# Patient Record
Sex: MALE | Race: WHITE | Employment: OTHER | URBAN - NONMETROPOLITAN AREA
[De-identification: names, ages, dates, MRNs, and addresses within clinical notes are randomized per-mention and may not be internally consistent; named-entity substitution may affect disease eponyms.]

---

## 2021-12-14 ENCOUNTER — HOSPITAL ENCOUNTER (INPATIENT)
Age: 26
LOS: 1 days | Discharge: ANOTHER HOSPITAL | DRG: 420 | End: 2021-12-16
Attending: FAMILY MEDICINE | Admitting: PEDIATRICS
Payer: MEDICARE

## 2021-12-14 ENCOUNTER — APPOINTMENT (OUTPATIENT)
Dept: GENERAL RADIOLOGY | Age: 26
DRG: 420 | End: 2021-12-14
Payer: MEDICARE

## 2021-12-14 DIAGNOSIS — I10 UNCONTROLLED HYPERTENSION: ICD-10-CM

## 2021-12-14 DIAGNOSIS — E11.00 HYPEROSMOLAR HYPERGLYCEMIC STATE (HHS) (HCC): ICD-10-CM

## 2021-12-14 DIAGNOSIS — N17.9 ACUTE KIDNEY INJURY SUPERIMPOSED ON CHRONIC KIDNEY DISEASE (HCC): Primary | ICD-10-CM

## 2021-12-14 DIAGNOSIS — D64.9 ANEMIA, UNSPECIFIED TYPE: ICD-10-CM

## 2021-12-14 DIAGNOSIS — I35.8 ENDOCARDITIS OF AORTIC VALVE: ICD-10-CM

## 2021-12-14 DIAGNOSIS — R77.8 ELEVATED TROPONIN: ICD-10-CM

## 2021-12-14 DIAGNOSIS — N18.9 ACUTE KIDNEY INJURY SUPERIMPOSED ON CHRONIC KIDNEY DISEASE (HCC): Primary | ICD-10-CM

## 2021-12-14 LAB
ALBUMIN SERPL-MCNC: 2.5 G/DL (ref 3.5–5.1)
ALP BLD-CCNC: 98 U/L (ref 38–126)
ALT SERPL-CCNC: 57 U/L (ref 11–66)
ANION GAP SERPL CALCULATED.3IONS-SCNC: 14 MEQ/L (ref 8–16)
AST SERPL-CCNC: 28 U/L (ref 5–40)
BASE EXCESS MIXED: -11.3 MMOL/L (ref -2–3)
BASOPHILS # BLD: 1.2 %
BASOPHILS ABSOLUTE: 0.1 THOU/MM3 (ref 0–0.1)
BETA-HYDROXYBUTYRATE: 0.91 MG/DL (ref 0.2–2.81)
BILIRUB SERPL-MCNC: < 0.2 MG/DL (ref 0.3–1.2)
BILIRUBIN DIRECT: < 0.2 MG/DL (ref 0–0.3)
BUN BLDV-MCNC: 77 MG/DL (ref 7–22)
CALCIUM SERPL-MCNC: 8.4 MG/DL (ref 8.5–10.5)
CHLORIDE BLD-SCNC: 97 MEQ/L (ref 98–111)
CO2: 15 MEQ/L (ref 23–33)
COLLECTED BY:: ABNORMAL
CREAT SERPL-MCNC: 4.5 MG/DL (ref 0.4–1.2)
EOSINOPHIL # BLD: 3.3 %
EOSINOPHILS ABSOLUTE: 0.3 THOU/MM3 (ref 0–0.4)
ERYTHROCYTE [DISTWIDTH] IN BLOOD BY AUTOMATED COUNT: 14.8 % (ref 11.5–14.5)
ERYTHROCYTE [DISTWIDTH] IN BLOOD BY AUTOMATED COUNT: 47.7 FL (ref 35–45)
GFR SERPL CREATININE-BSD FRML MDRD: 16 ML/MIN/1.73M2
GLUCOSE BLD-MCNC: 410 MG/DL (ref 70–108)
GLUCOSE BLD-MCNC: 505 MG/DL (ref 70–108)
GLUCOSE BLD-MCNC: 862 MG/DL (ref 70–108)
HCO3, MIXED: 13 MMOL/L (ref 23–28)
HCT VFR BLD CALC: 27.2 % (ref 42–52)
HEMOGLOBIN: 8.7 GM/DL (ref 14–18)
IMMATURE GRANS (ABS): 0.04 THOU/MM3 (ref 0–0.07)
IMMATURE GRANULOCYTES: 0.5 %
LYMPHOCYTES # BLD: 8.1 %
LYMPHOCYTES ABSOLUTE: 0.7 THOU/MM3 (ref 1–4.8)
MCH RBC QN AUTO: 28.3 PG (ref 26–33)
MCHC RBC AUTO-ENTMCNC: 32 GM/DL (ref 32.2–35.5)
MCV RBC AUTO: 88.6 FL (ref 80–94)
MONOCYTES # BLD: 12.3 %
MONOCYTES ABSOLUTE: 1 THOU/MM3 (ref 0.4–1.3)
NUCLEATED RED BLOOD CELLS: 0 /100 WBC
O2 SAT, MIXED: 94 %
OSMOLALITY CALCULATION: 318.7 MOSMOL/KG (ref 275–300)
PCO2, MIXED VENOUS: 24 MMHG (ref 41–51)
PH, MIXED: 7.34 (ref 7.31–7.41)
PLATELET # BLD: 422 THOU/MM3 (ref 130–400)
PMV BLD AUTO: 11.6 FL (ref 9.4–12.4)
PO2 MIXED: 74 MMHG (ref 25–40)
POTASSIUM SERPL-SCNC: 5.9 MEQ/L (ref 3.5–5.2)
PRO-BNP: 4931 PG/ML (ref 0–450)
RBC # BLD: 3.07 MILL/MM3 (ref 4.7–6.1)
SEG NEUTROPHILS: 74.6 %
SEGMENTED NEUTROPHILS ABSOLUTE COUNT: 6 THOU/MM3 (ref 1.8–7.7)
SODIUM BLD-SCNC: 126 MEQ/L (ref 135–145)
TOTAL PROTEIN: 5.5 G/DL (ref 6.1–8)
TROPONIN T: 0.12 NG/ML
WBC # BLD: 8.1 THOU/MM3 (ref 4.8–10.8)

## 2021-12-14 PROCEDURE — 82248 BILIRUBIN DIRECT: CPT

## 2021-12-14 PROCEDURE — 96376 TX/PRO/DX INJ SAME DRUG ADON: CPT

## 2021-12-14 PROCEDURE — 96361 HYDRATE IV INFUSION ADD-ON: CPT

## 2021-12-14 PROCEDURE — 36415 COLL VENOUS BLD VENIPUNCTURE: CPT

## 2021-12-14 PROCEDURE — 80053 COMPREHEN METABOLIC PANEL: CPT

## 2021-12-14 PROCEDURE — 83880 ASSAY OF NATRIURETIC PEPTIDE: CPT

## 2021-12-14 PROCEDURE — 82803 BLOOD GASES ANY COMBINATION: CPT

## 2021-12-14 PROCEDURE — 6370000000 HC RX 637 (ALT 250 FOR IP): Performed by: FAMILY MEDICINE

## 2021-12-14 PROCEDURE — 96365 THER/PROPH/DIAG IV INF INIT: CPT

## 2021-12-14 PROCEDURE — 82010 KETONE BODYS QUAN: CPT

## 2021-12-14 PROCEDURE — 2580000003 HC RX 258: Performed by: PHYSICIAN ASSISTANT

## 2021-12-14 PROCEDURE — 93005 ELECTROCARDIOGRAM TRACING: CPT | Performed by: PHYSICIAN ASSISTANT

## 2021-12-14 PROCEDURE — 71045 X-RAY EXAM CHEST 1 VIEW: CPT

## 2021-12-14 PROCEDURE — 99284 EMERGENCY DEPT VISIT MOD MDM: CPT

## 2021-12-14 PROCEDURE — 84484 ASSAY OF TROPONIN QUANT: CPT

## 2021-12-14 PROCEDURE — 6360000002 HC RX W HCPCS: Performed by: PHYSICIAN ASSISTANT

## 2021-12-14 PROCEDURE — 82948 REAGENT STRIP/BLOOD GLUCOSE: CPT

## 2021-12-14 PROCEDURE — 85025 COMPLETE CBC W/AUTO DIFF WBC: CPT

## 2021-12-14 PROCEDURE — 96366 THER/PROPH/DIAG IV INF ADDON: CPT

## 2021-12-14 PROCEDURE — 2580000003 HC RX 258: Performed by: FAMILY MEDICINE

## 2021-12-14 PROCEDURE — 96375 TX/PRO/DX INJ NEW DRUG ADDON: CPT

## 2021-12-14 PROCEDURE — 6370000000 HC RX 637 (ALT 250 FOR IP): Performed by: PHYSICIAN ASSISTANT

## 2021-12-14 RX ORDER — INSULIN GLARGINE 100 [IU]/ML
20 INJECTION, SOLUTION SUBCUTANEOUS 2 TIMES DAILY
COMMUNITY

## 2021-12-14 RX ORDER — FLUOXETINE HYDROCHLORIDE 20 MG/1
20 CAPSULE ORAL NIGHTLY
COMMUNITY

## 2021-12-14 RX ORDER — HYDRALAZINE HYDROCHLORIDE 20 MG/ML
10 INJECTION INTRAMUSCULAR; INTRAVENOUS ONCE
Status: COMPLETED | OUTPATIENT
Start: 2021-12-14 | End: 2021-12-14

## 2021-12-14 RX ORDER — PREGABALIN 100 MG/1
200 CAPSULE ORAL 2 TIMES DAILY
COMMUNITY

## 2021-12-14 RX ORDER — 0.9 % SODIUM CHLORIDE 0.9 %
1000 INTRAVENOUS SOLUTION INTRAVENOUS ONCE
Status: COMPLETED | OUTPATIENT
Start: 2021-12-14 | End: 2021-12-14

## 2021-12-14 RX ORDER — QUETIAPINE FUMARATE 25 MG/1
25 TABLET, FILM COATED ORAL NIGHTLY
COMMUNITY

## 2021-12-14 RX ADMIN — SODIUM CHLORIDE 10.8 UNITS/HR: 9 INJECTION, SOLUTION INTRAVENOUS at 21:26

## 2021-12-14 RX ADMIN — INSULIN HUMAN 5 UNITS: 100 INJECTION, SOLUTION PARENTERAL at 21:03

## 2021-12-14 RX ADMIN — HYDRALAZINE HYDROCHLORIDE 10 MG: 20 INJECTION INTRAMUSCULAR; INTRAVENOUS at 20:52

## 2021-12-14 RX ADMIN — SODIUM CHLORIDE 500 ML: 9 INJECTION, SOLUTION INTRAVENOUS at 20:42

## 2021-12-14 ASSESSMENT — PAIN SCALES - GENERAL
PAINLEVEL_OUTOF10: 8
PAINLEVEL_OUTOF10: 9

## 2021-12-14 ASSESSMENT — PAIN DESCRIPTION - PAIN TYPE: TYPE: ACUTE PAIN

## 2021-12-14 ASSESSMENT — ENCOUNTER SYMPTOMS
DIARRHEA: 0
ABDOMINAL PAIN: 0
VOMITING: 0
SORE THROAT: 0
PHOTOPHOBIA: 0
NAUSEA: 0
SHORTNESS OF BREATH: 0
RHINORRHEA: 0
COUGH: 0

## 2021-12-14 ASSESSMENT — PAIN DESCRIPTION - LOCATION: LOCATION: GENERALIZED

## 2021-12-14 NOTE — Clinical Note
Patient Class: Inpatient [101]   REQUIRED: Diagnosis: DKA, type 1, not at goal [facility] [[id number]]   Estimated Length of Stay: Estimated stay of more than 2 midnights   Admitting Provider: Junaid Tompkins [[id number]]   Telemetry/Cardiac Monitoring Required?: Yes

## 2021-12-14 NOTE — ED TRIAGE NOTES
Pt presents to the ED through triage with c/c \"needing dialysis. \" Pt states he was seen at [facility] directly before coming here. Pt as at [facility] center to receive IV vancomycin d/t endocarditis. Pt also told he needs dialysis d/t swelling in bilateral extremities and creatine 4.7. Pt is a diabetic/kidney failure patient. Pt states he has never had dialysis before. At [facility], pt states his potassium was also elevated. Pt was given 30g Kayexalate PTA. Vitals stable. Pt rates pain 9/10 all over his body.  IV established

## 2021-12-15 VITALS
WEIGHT: 115 LBS | OXYGEN SATURATION: 99 % | RESPIRATION RATE: 16 BRPM | BODY MASS INDEX: 20.38 KG/M2 | HEART RATE: 95 BPM | TEMPERATURE: 98 F | HEIGHT: 63 IN | DIASTOLIC BLOOD PRESSURE: 85 MMHG | SYSTOLIC BLOOD PRESSURE: 143 MMHG

## 2021-12-15 PROBLEM — E87.20 METABOLIC ACIDOSIS: Status: ACTIVE | Noted: 2021-12-15

## 2021-12-15 PROBLEM — E11.9 DM2 (DIABETES MELLITUS, TYPE 2) (HCC): Status: ACTIVE | Noted: 2021-12-15

## 2021-12-15 PROBLEM — I33.0 ACUTE INFECTIVE ENDOCARDITIS: Status: ACTIVE | Noted: 2021-12-15

## 2021-12-15 PROBLEM — E10.10 DKA, TYPE 1, NOT AT GOAL (HCC): Status: ACTIVE | Noted: 2021-12-15

## 2021-12-15 PROBLEM — R60.9 PERIPHERAL EDEMA: Status: ACTIVE | Noted: 2021-12-15

## 2021-12-15 PROBLEM — N17.9 AKI (ACUTE KIDNEY INJURY) (HCC): Status: ACTIVE | Noted: 2021-12-15

## 2021-12-15 LAB
AMPHETAMINE+METHAMPHETAMINE URINE SCREEN: NEGATIVE
ANION GAP SERPL CALCULATED.3IONS-SCNC: 13 MEQ/L (ref 8–16)
ANION GAP SERPL CALCULATED.3IONS-SCNC: 15 MEQ/L (ref 8–16)
ANION GAP SERPL CALCULATED.3IONS-SCNC: 17 MEQ/L (ref 8–16)
BACTERIA: ABNORMAL /HPF
BARBITURATE QUANTITATIVE URINE: NEGATIVE
BASOPHILS # BLD: 2 %
BASOPHILS ABSOLUTE: 0.2 THOU/MM3 (ref 0–0.1)
BENZODIAZEPINE QUANTITATIVE URINE: NEGATIVE
BILIRUBIN URINE: NEGATIVE
BLOOD, URINE: ABNORMAL
BUN BLDV-MCNC: 75 MG/DL (ref 7–22)
CALCIUM SERPL-MCNC: 8.3 MG/DL (ref 8.5–10.5)
CALCIUM SERPL-MCNC: 8.4 MG/DL (ref 8.5–10.5)
CALCIUM SERPL-MCNC: 8.7 MG/DL (ref 8.5–10.5)
CANNABINOID QUANTITATIVE URINE: NEGATIVE
CASTS 2: ABNORMAL /LPF
CASTS UA: ABNORMAL /LPF
CHARACTER, URINE: CLEAR
CHLORIDE BLD-SCNC: 104 MEQ/L (ref 98–111)
CHLORIDE BLD-SCNC: 106 MEQ/L (ref 98–111)
CHLORIDE BLD-SCNC: 108 MEQ/L (ref 98–111)
CO2: 14 MEQ/L (ref 23–33)
CO2: 14 MEQ/L (ref 23–33)
CO2: 15 MEQ/L (ref 23–33)
COCAINE METABOLITE QUANTITATIVE URINE: NEGATIVE
COLOR: YELLOW
CREAT SERPL-MCNC: 4.6 MG/DL (ref 0.4–1.2)
CREAT SERPL-MCNC: 4.6 MG/DL (ref 0.4–1.2)
CREAT SERPL-MCNC: 4.8 MG/DL (ref 0.4–1.2)
CREATININE URINE: 24.8 MG/DL
CRYSTALS, UA: ABNORMAL
EKG ATRIAL RATE: 84 BPM
EKG P AXIS: 55 DEGREES
EKG P-R INTERVAL: 162 MS
EKG Q-T INTERVAL: 360 MS
EKG QRS DURATION: 78 MS
EKG QTC CALCULATION (BAZETT): 425 MS
EKG R AXIS: 53 DEGREES
EKG T AXIS: 58 DEGREES
EKG VENTRICULAR RATE: 84 BPM
EOSINOPHIL # BLD: 4.1 %
EOSINOPHILS ABSOLUTE: 0.4 THOU/MM3 (ref 0–0.4)
EPITHELIAL CELLS, UA: ABNORMAL /HPF
ERYTHROCYTE [DISTWIDTH] IN BLOOD BY AUTOMATED COUNT: 14.7 % (ref 11.5–14.5)
ERYTHROCYTE [DISTWIDTH] IN BLOOD BY AUTOMATED COUNT: 46.7 FL (ref 35–45)
GFR SERPL CREATININE-BSD FRML MDRD: 15 ML/MIN/1.73M2
GLUCOSE BLD-MCNC: 100 MG/DL (ref 70–108)
GLUCOSE BLD-MCNC: 106 MG/DL (ref 70–108)
GLUCOSE BLD-MCNC: 157 MG/DL (ref 70–108)
GLUCOSE BLD-MCNC: 209 MG/DL (ref 70–108)
GLUCOSE BLD-MCNC: 228 MG/DL (ref 70–108)
GLUCOSE BLD-MCNC: 308 MG/DL (ref 70–108)
GLUCOSE BLD-MCNC: 311 MG/DL (ref 70–108)
GLUCOSE BLD-MCNC: 413 MG/DL (ref 70–108)
GLUCOSE BLD-MCNC: 430 MG/DL (ref 70–108)
GLUCOSE BLD-MCNC: 541 MG/DL (ref 70–108)
GLUCOSE BLD-MCNC: 79 MG/DL (ref 70–108)
GLUCOSE BLD-MCNC: 80 MG/DL (ref 70–108)
GLUCOSE BLD-MCNC: 81 MG/DL (ref 70–108)
GLUCOSE BLD-MCNC: 84 MG/DL (ref 70–108)
GLUCOSE BLD-MCNC: 85 MG/DL (ref 70–108)
GLUCOSE URINE: >= 1000 MG/DL
HCT VFR BLD CALC: 28.6 % (ref 42–52)
HEMOGLOBIN: 9.3 GM/DL (ref 14–18)
IMMATURE GRANS (ABS): 0.05 THOU/MM3 (ref 0–0.07)
IMMATURE GRANULOCYTES: 0.6 %
KETONES, URINE: NEGATIVE
LEUKOCYTE ESTERASE, URINE: NEGATIVE
LYMPHOCYTES # BLD: 12.9 %
LYMPHOCYTES ABSOLUTE: 1.2 THOU/MM3 (ref 1–4.8)
MAGNESIUM: 1.9 MG/DL (ref 1.6–2.4)
MAGNESIUM: 2 MG/DL (ref 1.6–2.4)
MCH RBC QN AUTO: 28.3 PG (ref 26–33)
MCHC RBC AUTO-ENTMCNC: 32.5 GM/DL (ref 32.2–35.5)
MCV RBC AUTO: 86.9 FL (ref 80–94)
MISCELLANEOUS 2: ABNORMAL
MONOCYTES # BLD: 16.3 %
MONOCYTES ABSOLUTE: 1.5 THOU/MM3 (ref 0.4–1.3)
NITRITE, URINE: NEGATIVE
NUCLEATED RED BLOOD CELLS: 0 /100 WBC
OPIATES, URINE: NEGATIVE
OSMOLALITY CALCULATION: 291.3 MOSMOL/KG (ref 275–300)
OSMOLALITY CALCULATION: 294.3 MOSMOL/KG (ref 275–300)
OSMOLALITY CALCULATION: 304 MOSMOL/KG (ref 275–300)
OXYCODONE: NEGATIVE
PH UA: 6.5 (ref 5–9)
PHENCYCLIDINE QUANTITATIVE URINE: NEGATIVE
PHOSPHORUS: 5.9 MG/DL (ref 2.4–4.7)
PLATELET # BLD: 502 THOU/MM3 (ref 130–400)
PMV BLD AUTO: 11.5 FL (ref 9.4–12.4)
POTASSIUM REFLEX MAGNESIUM: 5.5 MEQ/L (ref 3.5–5.2)
POTASSIUM SERPL-SCNC: 4.4 MEQ/L (ref 3.5–5.2)
POTASSIUM SERPL-SCNC: 4.5 MEQ/L (ref 3.5–5.2)
PROT/CREAT RATIO, UR: 7.86
PROTEIN UA: 300
PROTEIN, URINE: 194.9 MG/DL
RBC # BLD: 3.29 MILL/MM3 (ref 4.7–6.1)
RBC URINE: ABNORMAL /HPF
RENAL EPITHELIAL, UA: ABNORMAL
SEG NEUTROPHILS: 64.1 %
SEGMENTED NEUTROPHILS ABSOLUTE COUNT: 5.8 THOU/MM3 (ref 1.8–7.7)
SODIUM BLD-SCNC: 135 MEQ/L (ref 135–145)
SODIUM BLD-SCNC: 135 MEQ/L (ref 135–145)
SODIUM BLD-SCNC: 136 MEQ/L (ref 135–145)
SPECIFIC GRAVITY, URINE: 1.01 (ref 1–1.03)
UROBILINOGEN, URINE: 0.2 EU/DL (ref 0–1)
VANCOMYCIN RANDOM: 24.6 UG/ML (ref 0.1–39.9)
WBC # BLD: 9.1 THOU/MM3 (ref 4.8–10.8)
WBC UA: ABNORMAL /HPF
YEAST: ABNORMAL

## 2021-12-15 PROCEDURE — 83735 ASSAY OF MAGNESIUM: CPT

## 2021-12-15 PROCEDURE — 81001 URINALYSIS AUTO W/SCOPE: CPT

## 2021-12-15 PROCEDURE — 99223 1ST HOSP IP/OBS HIGH 75: CPT | Performed by: PEDIATRICS

## 2021-12-15 PROCEDURE — 84100 ASSAY OF PHOSPHORUS: CPT

## 2021-12-15 PROCEDURE — 6360000002 HC RX W HCPCS: Performed by: PEDIATRICS

## 2021-12-15 PROCEDURE — 2500000003 HC RX 250 WO HCPCS: Performed by: PEDIATRICS

## 2021-12-15 PROCEDURE — 82570 ASSAY OF URINE CREATININE: CPT

## 2021-12-15 PROCEDURE — 87040 BLOOD CULTURE FOR BACTERIA: CPT

## 2021-12-15 PROCEDURE — 99222 1ST HOSP IP/OBS MODERATE 55: CPT | Performed by: NURSE PRACTITIONER

## 2021-12-15 PROCEDURE — 6370000000 HC RX 637 (ALT 250 FOR IP): Performed by: PODIATRIST

## 2021-12-15 PROCEDURE — 99253 IP/OBS CNSLTJ NEW/EST LOW 45: CPT | Performed by: UROLOGY

## 2021-12-15 PROCEDURE — 93010 ELECTROCARDIOGRAM REPORT: CPT | Performed by: INTERNAL MEDICINE

## 2021-12-15 PROCEDURE — 96367 TX/PROPH/DG ADDL SEQ IV INF: CPT

## 2021-12-15 PROCEDURE — 99254 IP/OBS CNSLTJ NEW/EST MOD 60: CPT | Performed by: INTERNAL MEDICINE

## 2021-12-15 PROCEDURE — 85025 COMPLETE CBC W/AUTO DIFF WBC: CPT

## 2021-12-15 PROCEDURE — 96372 THER/PROPH/DIAG INJ SC/IM: CPT

## 2021-12-15 PROCEDURE — G0378 HOSPITAL OBSERVATION PER HR: HCPCS

## 2021-12-15 PROCEDURE — 6370000000 HC RX 637 (ALT 250 FOR IP): Performed by: PEDIATRICS

## 2021-12-15 PROCEDURE — 2580000003 HC RX 258: Performed by: PEDIATRICS

## 2021-12-15 PROCEDURE — 96366 THER/PROPH/DIAG IV INF ADDON: CPT

## 2021-12-15 PROCEDURE — 84156 ASSAY OF PROTEIN URINE: CPT

## 2021-12-15 PROCEDURE — 80202 ASSAY OF VANCOMYCIN: CPT

## 2021-12-15 PROCEDURE — 1200000000 HC SEMI PRIVATE

## 2021-12-15 PROCEDURE — 6370000000 HC RX 637 (ALT 250 FOR IP): Performed by: INTERNAL MEDICINE

## 2021-12-15 PROCEDURE — 36415 COLL VENOUS BLD VENIPUNCTURE: CPT

## 2021-12-15 PROCEDURE — 80048 BASIC METABOLIC PNL TOTAL CA: CPT

## 2021-12-15 PROCEDURE — 80307 DRUG TEST PRSMV CHEM ANLYZR: CPT

## 2021-12-15 PROCEDURE — 82948 REAGENT STRIP/BLOOD GLUCOSE: CPT

## 2021-12-15 RX ORDER — PREGABALIN 50 MG/1
100 CAPSULE ORAL DAILY
Status: DISCONTINUED | OUTPATIENT
Start: 2021-12-15 | End: 2021-12-16 | Stop reason: HOSPADM

## 2021-12-15 RX ORDER — SODIUM CHLORIDE 9 MG/ML
25 INJECTION, SOLUTION INTRAVENOUS PRN
Status: CANCELLED | OUTPATIENT
Start: 2021-12-15

## 2021-12-15 RX ORDER — FLUOXETINE HYDROCHLORIDE 20 MG/1
20 CAPSULE ORAL NIGHTLY
Status: DISCONTINUED | OUTPATIENT
Start: 2021-12-15 | End: 2021-12-16 | Stop reason: HOSPADM

## 2021-12-15 RX ORDER — DEXTROSE MONOHYDRATE 25 G/50ML
12.5 INJECTION, SOLUTION INTRAVENOUS PRN
Status: DISCONTINUED | OUTPATIENT
Start: 2021-12-15 | End: 2021-12-16 | Stop reason: HOSPADM

## 2021-12-15 RX ORDER — POLYETHYLENE GLYCOL 3350 17 G
2 POWDER IN PACKET (EA) ORAL
Status: DISCONTINUED | OUTPATIENT
Start: 2021-12-15 | End: 2021-12-16 | Stop reason: HOSPADM

## 2021-12-15 RX ORDER — POTASSIUM CHLORIDE 7.45 MG/ML
10 INJECTION INTRAVENOUS PRN
Status: DISCONTINUED | OUTPATIENT
Start: 2021-12-15 | End: 2021-12-15

## 2021-12-15 RX ORDER — NICOTINE POLACRILEX 4 MG
15 LOZENGE BUCCAL PRN
Status: DISCONTINUED | OUTPATIENT
Start: 2021-12-15 | End: 2021-12-16 | Stop reason: HOSPADM

## 2021-12-15 RX ORDER — QUETIAPINE FUMARATE 25 MG/1
25 TABLET, FILM COATED ORAL NIGHTLY
Status: DISCONTINUED | OUTPATIENT
Start: 2021-12-15 | End: 2021-12-16 | Stop reason: HOSPADM

## 2021-12-15 RX ORDER — SODIUM CHLORIDE 9 MG/ML
INJECTION, SOLUTION INTRAVENOUS CONTINUOUS
Status: DISCONTINUED | OUTPATIENT
Start: 2021-12-15 | End: 2021-12-15

## 2021-12-15 RX ORDER — SODIUM CHLORIDE 0.9 % (FLUSH) 0.9 %
5-40 SYRINGE (ML) INJECTION EVERY 12 HOURS SCHEDULED
Status: CANCELLED | OUTPATIENT
Start: 2021-12-15

## 2021-12-15 RX ORDER — POLYETHYLENE GLYCOL 3350 17 G/17G
17 POWDER, FOR SOLUTION ORAL DAILY PRN
Status: DISCONTINUED | OUTPATIENT
Start: 2021-12-15 | End: 2021-12-16 | Stop reason: HOSPADM

## 2021-12-15 RX ORDER — MAGNESIUM SULFATE 1 G/100ML
1000 INJECTION INTRAVENOUS PRN
Status: DISCONTINUED | OUTPATIENT
Start: 2021-12-15 | End: 2021-12-15

## 2021-12-15 RX ORDER — SODIUM CHLORIDE 9 MG/ML
INJECTION, SOLUTION INTRAVENOUS CONTINUOUS
Status: CANCELLED | OUTPATIENT
Start: 2021-12-15

## 2021-12-15 RX ORDER — HEPARIN SODIUM 5000 [USP'U]/ML
5000 INJECTION, SOLUTION INTRAVENOUS; SUBCUTANEOUS EVERY 8 HOURS SCHEDULED
Status: DISCONTINUED | OUTPATIENT
Start: 2021-12-15 | End: 2021-12-16 | Stop reason: HOSPADM

## 2021-12-15 RX ORDER — DEXTROSE MONOHYDRATE 50 MG/ML
100 INJECTION, SOLUTION INTRAVENOUS PRN
Status: DISCONTINUED | OUTPATIENT
Start: 2021-12-15 | End: 2021-12-16 | Stop reason: HOSPADM

## 2021-12-15 RX ORDER — DEXTROSE AND SODIUM CHLORIDE 5; .45 G/100ML; G/100ML
INJECTION, SOLUTION INTRAVENOUS CONTINUOUS PRN
Status: DISCONTINUED | OUTPATIENT
Start: 2021-12-15 | End: 2021-12-16 | Stop reason: HOSPADM

## 2021-12-15 RX ORDER — DEXTROSE MONOHYDRATE 25 G/50ML
12.5 INJECTION, SOLUTION INTRAVENOUS PRN
Status: DISCONTINUED | OUTPATIENT
Start: 2021-12-15 | End: 2021-12-15 | Stop reason: ALTCHOICE

## 2021-12-15 RX ORDER — INSULIN GLARGINE 100 [IU]/ML
5 INJECTION, SOLUTION SUBCUTANEOUS 2 TIMES DAILY
Status: DISCONTINUED | OUTPATIENT
Start: 2021-12-15 | End: 2021-12-16 | Stop reason: HOSPADM

## 2021-12-15 RX ORDER — SODIUM CHLORIDE 0.9 % (FLUSH) 0.9 %
5-40 SYRINGE (ML) INJECTION PRN
Status: CANCELLED | OUTPATIENT
Start: 2021-12-15

## 2021-12-15 RX ADMIN — QUETIAPINE FUMARATE 25 MG: 25 TABLET ORAL at 20:45

## 2021-12-15 RX ADMIN — INSULIN GLARGINE 5 UNITS: 100 INJECTION, SOLUTION SUBCUTANEOUS at 15:44

## 2021-12-15 RX ADMIN — FLUOXETINE 20 MG: 20 CAPSULE ORAL at 03:48

## 2021-12-15 RX ADMIN — INSULIN LISPRO 12 UNITS: 100 INJECTION, SOLUTION INTRAVENOUS; SUBCUTANEOUS at 20:46

## 2021-12-15 RX ADMIN — DEXTROSE AND SODIUM CHLORIDE: 5; 450 INJECTION, SOLUTION INTRAVENOUS at 01:54

## 2021-12-15 RX ADMIN — PREGABALIN 100 MG: 50 CAPSULE ORAL at 09:10

## 2021-12-15 RX ADMIN — INSULIN GLARGINE 5 UNITS: 100 INJECTION, SOLUTION SUBCUTANEOUS at 23:41

## 2021-12-15 RX ADMIN — FLUOXETINE 20 MG: 20 CAPSULE ORAL at 20:45

## 2021-12-15 RX ADMIN — HEPARIN SODIUM 5000 UNITS: 5000 INJECTION INTRAVENOUS; SUBCUTANEOUS at 20:45

## 2021-12-15 RX ADMIN — SODIUM BICARBONATE: 84 INJECTION, SOLUTION INTRAVENOUS at 06:43

## 2021-12-15 RX ADMIN — QUETIAPINE FUMARATE 25 MG: 25 TABLET ORAL at 03:48

## 2021-12-15 RX ADMIN — HEPARIN SODIUM 5000 UNITS: 5000 INJECTION INTRAVENOUS; SUBCUTANEOUS at 05:00

## 2021-12-15 ASSESSMENT — PAIN SCALES - GENERAL: PAINLEVEL_OUTOF10: 6

## 2021-12-15 NOTE — ED NOTES
Pt requesting something to eat. Perfect serve message sent to Dr. Jovan Bauer.       [facility], RN  12/15/21 0155

## 2021-12-15 NOTE — H&P
[facility]  Sedation/Analgesia History & Physical    Pt Name: Romario Gaspar  Account number: [de-identified]  MRN: [id number]  YOB: 1995  Provider Performing Procedure: Katy Marx MD MD  Referring Provider: Shailesh Sharma MD   Primary Care Physician: No primary care provider on file. Date: 12/15/2021    PRE-PROCEDURE    Code Status: FULL CODE  Brief History/Pre-Procedure Diagnosis:   CHF  Endocarditis  AV vegetation      Consent: : I have discussed with the patient risks, benefits, and alternatives (and relevant risks, benefits, and side effects related to alternatives or not receiving care), and likelihood of the success. The patient and/or representative appear to understand and agree to proceed. The discussion encompasses risks, benefits, and side effects related to the alternatives and the risks related to not receiving the proposed care, treatment, and services. The indication, risks and benefits of the procedure and possible therapeutic consequences and alternatives were discussed with the patient. The patient was given the opportunity to ask questions and to have them answered to his/her satisfaction. Risks of the procedure include but are not limited to the following: Bleeding, hematoma including retroperitoneal hemmorhage, infection, pain and discomfort, injury to the aorta and other blood vessels, rhythm disturbance, low blood pressure, myocardial infarction, stroke, kidney damage/failure, myocardial perforation, allergic reactions to sedatives/contrast material, loss of pulse/vascular compromise requiring surgery, aneurysm/pseudoaneurysm formation, possible loss of a limb/hand/leg, needing blood transfusion, requiring emergent open heart surgery or emergent coronary intervention, the need for intubation/respiratory support, the requirement for defibrillation/cardioversion, and death. Alternatives to and omission of the suggested procedure were discussed.  The Units, 5,000 Units, SubCUTAneous, 3 times per day, Boni Carter MD, 5,000 Units at 12/15/21 0500    vancomycin (VANCOCIN) intermittent dosing (placeholder), , Other, RX Placeholder, Boni Carter MD    sodium bicarbonate 75 mEq in sodium chloride 0.45 % 1,000 mL infusion, , IntraVENous, Continuous, Boni Carter MD, Last Rate: 50 mL/hr at 12/15/21 0643, New Bag at 12/15/21 0643    Current Outpatient Medications:     insulin glargine (LANTUS) 100 UNIT/ML injection vial, Inject 20 Units into the skin 2 times daily Every morning and every evening, Disp: , Rfl:     pregabalin (LYRICA) 100 MG capsule, Take 200 mg by mouth 2 times daily. , Disp: , Rfl:     QUEtiapine (SEROQUEL) 25 MG tablet, Take 25 mg by mouth nightly, Disp: , Rfl:     FLUoxetine (PROZAC) 20 MG capsule, Take 20 mg by mouth nightly, Disp: , Rfl:     FERROUS SULFATE PO, Take 324 mg by mouth every other day, Disp: , Rfl:   Prior to Admission medications    Medication Sig Start Date End Date Taking? Authorizing Provider   insulin glargine (LANTUS) 100 UNIT/ML injection vial Inject 20 Units into the skin 2 times daily Every morning and every evening   Yes Historical Provider, MD   pregabalin (LYRICA) 100 MG capsule Take 200 mg by mouth 2 times daily. Yes Historical Provider, MD   QUEtiapine (SEROQUEL) 25 MG tablet Take 25 mg by mouth nightly   Yes Historical Provider, MD   FLUoxetine (PROZAC) 20 MG capsule Take 20 mg by mouth nightly   Yes Historical Provider, MD   FERROUS SULFATE PO Take 324 mg by mouth every other day   Yes Historical Provider, MD     Additional information:       VITAL SIGNS   Vitals:    12/15/21 1012   BP: (!) 137/91   Pulse: 86   Resp: 18   Temp:    SpO2: 98%       PHYSICAL:   General: No acute distress  HEENT:  Unremarkable for age  Neck: without increased JVD, carotid pulses 2+ bilaterally without bruits  Heart: RRR, S1 & S2 WNL.  murmur    Lungs: Clear to auscultation    Abdomen: BS present, without HSM, masses, or tenderness    Extremities: without C. Pulses 2+ bilaterally   Mental Status: Alert & Oriented        PLANNED PROCEDURE   []Cath  []PCI                []Pacemaker/AICD  [x]DAINA             []Cardioversion []Peripheral angiography/PTA  []Other:      SEDATION  Planned agent:[x]Midazolam []Meperidine []Sublimaze []Morphine  []Diazepam  []Other:     ASA Classification:  []1 []2 [x]3 []4 []5   Class 1: A normal healthy patient  Class 2: Pt with mild to moderate systemic disease  Class 3: Severe systemic disease or disturbance  Class 4: Severe systemic disorders that are already life threatening. Class 5: Moribund pt with little chances of survival, for more than 24 hours. Mallampati I Airway Classification:   []1 [x]2 []3 []4     [x]Pre-procedure diagnostic studies complete and results available. Comment:    [x]Previous sedation/anesthesia experiences assessed. Comment:    [x]The patient is an appropriate candidate to undergo the planned procedure sedation and anesthesia. (Refer to nursing sedation/analgesia documentation record)  [x]Formulation and discussion of sedation/procedure plan, risks, and expectations with patient and/or responsible adult completed. [x]Patient examined immediately prior to the procedure.  (Refer to nursing sedation/analgesia documentation record)      Shy García MD, Zion Camacho    Electronically signed 12/15/2021 at 1:08 PM

## 2021-12-15 NOTE — ED NOTES
Perfect serve message sent to dr Margie Rosas regarding pt bs of 106 and when to shut off insulin drip. Pt updated with plan of care. And medicated per order denies needs at this time.       Tapan Hale RN  12/15/21 9113

## 2021-12-15 NOTE — ED PROVIDER NOTES
[facility] EMERGENCY DEPT      CHIEF COMPLAINT       Chief Complaint   Patient presents with    Other     Abnormal Labs       Nurses Notes reviewed and I agree except as noted in the HPI. HISTORY OF PRESENT ILLNESS    Kisha Grossman is a 27 y.o. male with a history of IDDM and stage IV chronic kidney disease who presents for admission. Patient was admitted to [facility] ER on 12-8-21. He had aggressive behavior and was found to have DKA vs HHS. He was intubated to protect his airway. During his inpatient stay he was found to have MSSA bacterial endocarditis. Cardiology there recommended IV vancomycin for 6 weeks. Patient was stabilized and discharged to follow-up in the outpatient infusion center for vancomycin infusions. He went there today and was found to have a creatinine of 4.7 (baseline 3.2), a blood sugar of 870, and hyperkalemia of 5.7. Hospitalist was called to the outpatient infusion center. The physician tried to find the patient placement as they were concerned he may need dialysis. The physician could not find an accepting facility and advised the patient to go to an ER of a hospital of higher level of care. Patient presents here somewhat somnolent with complaints of fluid retention in his arms and legs, testicle swelling, and fatigue. Cousin who is with him reports the patient has not been sleeping well at night due to pain from neuropathy. Previous to this patient seems to travel around a lot. The last time he saw a nephrologist was 5 months ago in [place]. Patient was last homeless and [place] and came here to stay with his cousin. Patient denies IV drug use. REVIEW OF SYSTEMS     Review of Systems   Constitutional: Positive for chills and fatigue. Negative for diaphoresis and fever. HENT: Negative for congestion, rhinorrhea and sore throat. Eyes: Negative for photophobia. Respiratory: Negative for cough and shortness of breath.     Cardiovascular: Positive for leg swelling. Negative for chest pain. Gastrointestinal: Negative for abdominal pain, diarrhea, nausea and vomiting. Genitourinary: Positive for decreased urine volume. Negative for difficulty urinating. Musculoskeletal: Positive for myalgias. Negative for gait problem. Skin: Negative for rash. Neurological: Negative for dizziness, weakness and light-headedness. Psychiatric/Behavioral: Negative for confusion. The patient is not nervous/anxious. PAST MEDICAL HISTORY    has a past medical history of Diabetes ([address] Foot drop, left foot, Kidney failure, and Neuropathy. SURGICAL HISTORY      has no past surgical history on file. CURRENT MEDICATIONS       Previous Medications    FERROUS SULFATE PO    Take 324 mg by mouth every other day    FLUOXETINE (PROZAC) 20 MG CAPSULE    Take 20 mg by mouth nightly    INSULIN GLARGINE (LANTUS) 100 UNIT/ML INJECTION VIAL    Inject 20 Units into the skin 2 times daily Every morning and every evening    PREGABALIN (LYRICA) 100 MG CAPSULE    Take 200 mg by mouth 2 times daily. QUETIAPINE (SEROQUEL) 25 MG TABLET    Take 25 mg by mouth nightly       ALLERGIES     has No Known Allergies. FAMILY HISTORY     has no family status information on file. family history is not on file. SOCIAL HISTORY    reports that he has been smoking cigarettes. He has never used smokeless tobacco. He reports current drug use. Drug: Marijuana Reyna Eglin). He reports that he does not drink alcohol. PHYSICAL EXAM     INITIAL VITALS:  height is 5' 3\" (1.6 m) and weight is 115 lb (52.2 kg). His oral temperature is 97.9 °F (36.6 °C). His blood pressure is 165/96 (abnormal) and his pulse is 98. His respiration is 18 and oxygen saturation is 99%. Physical Exam  Vitals and nursing note reviewed. Constitutional:       General: He is not in acute distress. Appearance: He is well-developed. He is ill-appearing. He is not toxic-appearing or diaphoretic.       Comments: Patient is somnolent   HENT:      Head: Normocephalic and atraumatic. Right Ear: Hearing normal.      Left Ear: Hearing normal.      Nose: Nose normal. No rhinorrhea. Mouth/Throat:      Pharynx: Uvula midline. No oropharyngeal exudate. Eyes:      General: Lids are normal. No scleral icterus. Conjunctiva/sclera: Conjunctivae normal.      Pupils: Pupils are equal, round, and reactive to light. Neck:      Trachea: No tracheal deviation. Cardiovascular:      Rate and Rhythm: Normal rate and regular rhythm. Heart sounds: Normal heart sounds. No murmur heard. Pulmonary:      Effort: Pulmonary effort is normal. No respiratory distress. Breath sounds: Normal breath sounds. No stridor. No decreased breath sounds or wheezing. Abdominal:      General: There is distension. Palpations: Abdomen is soft. Abdomen is not rigid. Tenderness: There is no abdominal tenderness. There is no guarding. Musculoskeletal:         General: Normal range of motion. Cervical back: Normal range of motion and neck supple. No rigidity. Right lower le+ Edema present. Left lower le+ Edema present. Lymphadenopathy:      Cervical: No cervical adenopathy. Skin:     General: Skin is warm and dry. Coloration: Skin is pale. Findings: No rash. Neurological:      Mental Status: He is alert and oriented to person, place, and time. GCS: GCS eye subscore is 4. GCS verbal subscore is 5. GCS motor subscore is 6. Gait: Gait normal.      Comments: No gross deficits observed   Psychiatric:         Mood and Affect: Mood normal.         Speech: Speech normal.         Behavior: Behavior normal.         Thought Content:  Thought content normal.         DIFFERENTIAL DIAGNOSIS:   Including but not limited to: DKA, HHS, medical noncompliance, end-stage renal disease, metabolic derangement    DIAGNOSTIC RESULTS     EKG: All E[facility]'s are [facility] who either signs or Co-signs this chart in the absence of a cardiologist.  Ventricular rate 84 bpm  OR interval 162 ms  QRS duration 78 ms  QTc interval 425 ms  P-R-T axes 55, 53, and 58  Normal sinus rhythm. No STEMI    RADIOLOGY: non-plain film images(s) such as CT,Ultrasound and MRI are read by the radiologist.  Plain radiographic images are visualized and preliminarily interpreted by the emergency physician unless otherwise stated below. XR CHEST PORTABLE   Final Result   Negative chest            **This report has been created using voice recognition software. It may contain minor errors which are inherent in voice recognition technology. **      Final report electronically signed by Dr. Miryam Granados on 12/14/2021 8:55 PM          LABS:   Labs Reviewed   CBC WITH AUTO DIFFERENTIAL - Abnormal; Notable for the following components:       Result Value    RBC 3.07 (*)     Hemoglobin 8.7 (*)     Hematocrit 27.2 (*)     MCHC 32.0 (*)     RDW-CV 14.8 (*)     RDW-SD 47.7 (*)     Platelets 280 (*)     Lymphocytes Absolute 0.7 (*)     All other components within normal limits   BASIC METABOLIC PANEL - Abnormal; Notable for the following components:    Sodium 126 (*)     Potassium 5.9 (*)     Chloride 97 (*)     CO2 15 (*)     Glucose 862 (*)     BUN 77 (*)     CREATININE 4.5 (*)     Calcium 8.4 (*)     All other components within normal limits   BRAIN NATRIURETIC PEPTIDE - Abnormal; Notable for the following components:    Pro-BNP 4931.0 (*)     All other components within normal limits   TROPONIN - Abnormal; Notable for the following components:    Troponin T 0.117 (*)     All other components within normal limits   HEPATIC FUNCTION PANEL - Abnormal; Notable for the following components:    Albumin 2.5 (*)     Total Bilirubin <0.2 (*)     Total Protein 5.5 (*)     All other components within normal limits   GLOMERULAR FILTRATION RATE, ESTIMATED - Abnormal; Notable for the following components:    Est, Glom Filt Rate 16 (*) physician, Dr. Siva Sarmiento, who aided in medical decision making. Reexamination: Patient resting comfortably with stable vital signs. Blood pressure slowly improving and serial Accu-Cheks performed. Results were discussed with the patient and cousin as well as desire for admission and they were amenable. Dr. Murtaza Heaton of our hospitalists' service was consulted and graciously accepted admission. The patient was admitted to the hospital in fair condition. CRITICAL CARE:   During my workup of this patient, it did become clear to me the critical nature of this patient's illness which did require my constant attention, and a critical care time 60 minutes was given    CONSULTS:  2115: Dr. Fanny Griffith (hospitalist)    PROCEDURES:  None    FINAL IMPRESSION      1. Acute kidney injury superimposed on chronic kidney disease ([address]    2. Anemia, unspecified type    3. Hyperosmolar hyperglycemic state (HHS) ([address]    4. Endocarditis of aortic valve, MSSA    5. Elevated troponin    6. Uncontrolled hypertension          DISPOSITION/PLAN     1. Acute kidney injury superimposed on chronic kidney disease ([address]    2. Anemia, unspecified type    3. Hyperosmolar hyperglycemic state (HHS) ([address]    4. Endocarditis of aortic valve, MSSA    5. Elevated troponin    6.  Uncontrolled hypertension    Admission    (Please note that portions of this note were completed with a voice recognition program.  Efforts were made to edit the dictations but occasionally words are mis-transcribed.)    Shellie Nazario PA-C 12/14/21 9:39 PM    VALARIE Macias PA-C  12/14/21 8183

## 2021-12-15 NOTE — ED NOTES
Perfect serve message sent to Nephrology consults to Dr Bj Sinclair.       Mckenzie Baxter, RN  12/15/21 0042

## 2021-12-15 NOTE — ED PROVIDER NOTES
Attending Supervising Physicians Attestation Statement  I was present with the physician assistant during the history and exam. I discussed the findings and plans with the physician assistant and agree as documented in her note     Electronically signed by Fabiola Pena MD on 12/14/21 at 9:57 PM KAREN Pena MD  12/14/21 9367

## 2021-12-15 NOTE — ED NOTES
Pt appears to be resting comfortably on cot. Pt denies any needs or concerns at this time. No distress noted. respirations even and unlabored. Call light in reach.       Selvin Medina RN  12/15/21 4433

## 2021-12-15 NOTE — PROGRESS NOTES
Pharmacy Note  Vancomycin Consult    Rick Monteiro is a 27 y.o. male started on Vancomycin for endocarditis; consult received from Dr. Chidi Moseley to manage therapy. Also receiving the following antibiotics: none listed. Patient Active Problem List   Diagnosis    DKA, type 1, not at goal [facility])     Allergies:  Patient has no known allergies. Temp max: 97.9    Recent Labs     12/14/21  1914   BUN 77*   CREATININE 4.5*   WBC 8.1     No intake or output data in the 24 hours ending 12/15/21 0140    Culture Date Source Results   12/10/2021 Blood ([facility]) Ngtd per [facility]         Ht Readings from Last 1 Encounters:   12/14/21 5' 3\" (1.6 m)        Wt Readings from Last 1 Encounters:   12/14/21 115 lb (52.2 kg)       Body mass index is 20.37 kg/m². Estimated Creatinine Clearance: 18 mL/min (A) (based on SCr of 4.5 mg/dL [facility])). Goal Trough Level: 15-20 mcg/mL    Assessment/Plan:  Will check random level on 12/15 at 1200 to determine dosing. Dose at [facility] was determined to be 750mg q48h. Last given 12/14 at ~ 1300. Thank you for the consult. Will continue to follow. Patient was receiving vanc as outpatient in [facility]. Last dose 12/14 ~ 1pm per [address] , [facility] - 12/11/2021 8:21 AM EST  Formatting of this note might be different from the original.  Vanco 1250 mg given 12/09/2021 at 1430  Trough after 1 dose was 19.6  1000 mg given 12/10/2021 at 1530. Using [employer] this writer change to vanco 750 mg ivpb every 48 hrs starting 12/12/2021 at 1600 with trough ordered prior to that dose at 1530.   AUC estimated to be 492 mcg*hr/ml and estimated trough to be 13.2      Electronically signed by Fozia Moody [facility] at 12/11/2021 8:25 AM EST    Back to top of Progress Notes    Nathaniel Fisher, [facility] - 12/10/2021 3:09 PM EST  Formatting of this note is different from the original.  Department of Pharmacy- Pharmacokinetics Progress Note    Patient: Rick Monteiro  MRN: [id number]  Room/Bed: PRELIM 12/10/21      BLOOD CULTURE PRELIM 12/10/21 09:43  12/10/21 No growth-preliminary      12/09/2021 05:10 PM SEE BELOW Preliminary   Comment:   Source: blood-Adult-suboptimal <5.5oz./set volume  Site: Peripheral Vein  Collected: 12/09/21 17:10  Current Antibiotics: unknown  Antibiotics comment:    --------------------------------------------------- C O M M E N T S  ----------------------------------------------------------------  . STATUS OF ORDERED AND REPORTED TESTS  BLOOD CULTURE PRELIM 12/10/21      BLOOD CULTURE PRELIM 12/10/21 09:43  12/10/21 No growth-preliminary        A pharmacist will continue to follow and order drug levels and adjust dosing as clinically appropriate. I have modified the orders in [facility] to reflect the above plan. Please feel free to contact me with any further questions.     Name: Jayne Barth, [address] [facility]  Phone: [phone]  Date/Time: 12/10/2021 3:09 PM    Linda Marino [facility], BCPS, BCGP  12/15/2021     1:47 AM

## 2021-12-15 NOTE — CONSULTS
Nephrology Consult Note    Patient - Jameel Daly   MRN -  [id number]      - 1995   27 y.o. Date of Admission -  2021  6:39 PM        Date of evaluation -  12/15/2021 10:00 AM    Reason for Consult  Elevated creatinine  Requesting Physician:  Brittany Cruz MD  History Information Obtained From: Nursing staff, Electronic medical records, Patient,     [facility] / HPI:   The patient is a 27 y.o. male with past medical history of DM I, HTN, CAD, CKD Stage IV who presented with c/o of gradually worsening overall body edema that began over a week ago. Apparently he recently moved to area from [facility] [place] and is living with his cousin. He had recent admission to [facility], obtunded and was intubated. He was hyperglycemia with DKA. Echo revealed vegetation  and is getting Vancomycin at [facility] pt clinic. He reports that his nephrologist in [place] said he was near dialysis. His initial BP was 205/107,  Initial BS on arrival was 862, Serum osm 318, Ketones neg. Creatinine 4.5 which is likely his baseline. K up to 5.9  He reports fair appetite, foods taste ok. Reports decreased UOP. Noted  ml since arrival    REVIEW OF SYSTEMS:   GENERAL: Pleasant,   Denies fever  HEENT: Denies Upper respiratory complaints  CARDIOVASCULAR: Denies chest pain/angina. RESPIRATORY:Denies cough, wheezing. Mild shortness of breath with walking  ABDOMEN: Denies nausea, vomiting, diarrhea  CNS:Denies headache, dizziness  MUSCULOSKELETAL: Reports joint arthralgia  SKIN: Denies rash  HEMATOLOGIC: Denies bruising     EXAM:  VITALS:  BP (!) 138/91   Pulse 83   Temp 98 °F (36.7 °C) (Oral)   Resp 18   Ht 5' 3\" (1.6 m)   Wt 115 lb (52.2 kg)   SpO2 99%   BMI 20.37 kg/m²      Constitutional:  No acute distress. Skin: No rash on exposed surfaces, No significant bruises  Heent:  Head is normocephalic, Extraocular movement intact, Voice is clear.   Neck: no jugular venous distention noted, Neck is supple  Cardiovascular:  S1, S2  regular rate and rhythm. Respiratory: Clear to ausculation bilaterally. Equal breath sounds, No crackles, No wheezes. No shortness of breath. Abdomen: Soft, non tender  Ext: Distal lower extremity temp is warm, 4+ generalized body edema, significant scrotal edema  Musculoskeletal: Movement is coordinated. Moves all extremities. Psychiatric: mood and affect appropriate  Neurological: Patient is alert and oriented to person, place, and time. Recent and remote   memory intact, Thought is coherant. Home Medications:  Not in a hospital admission. Current Medications:     FLUoxetine  20 mg Oral Nightly    pregabalin  100 mg Oral Daily    QUEtiapine  25 mg Oral Nightly    heparin (porcine)  5,000 Units SubCUTAneous 3 times per day    vancomycin (VANCOCIN) intermittent dosing (placeholder)   Other RX Placeholder     Continuous Infusions:   dextrose 5 % and 0.45 % NaCl 150 mL/hr (12/15/21 0413)    sodium bicarbonate infusion 50 mL/hr at 12/15/21 0643     Med and Labs reviewed  24HR INTAKE/OUTPUT:      Intake/Output Summary (Last 24 hours) at 12/15/2021 1000  Last data filed at 12/15/2021 0243  Gross per 24 hour   Intake 55.37 ml   Output 800 ml   Net -744.63 ml       I/O last 3 completed shifts: In: 55.4 [I.V.:55.4]  Out: 800 [Urine:800]  Patient Vitals for the past 96 hrs (Last 3 readings):   Weight   12/14/21 1845 115 lb (52.2 kg)     Body mass index is 20.37 kg/m². BP Readings from Last 5 Encounters:   12/15/21 (!) 138/91       Allergies:  Patient has no known allergies. Allergies/Intolerances: ALLERGIES: Patient has no known allergies. Past Medical, Family, Social History:   has a past medical history of Diabetes ([address] Foot drop, left foot, Kidney failure, and Neuropathy. has no past surgical history on file. family history is not on file. reports that he has been smoking cigarettes.  He has never used smokeless tobacco. He reports current drug use. Drug: Marijuana Maximiliano Mustard). He reports that he does not drink alcohol. Diagnostic Data:  Recent Labs     12/14/21  1914 12/14/21  1914 12/15/21  0415 12/15/21  0415 12/15/21  0722   *  --  136  --  135   K 5.9*  --  4.5  --  4.4   CL 97*  --  106  --  108   CO2 15*  --  15*  --  14*   BUN 77*  --  75*  --  75*   CREATININE 4.5*  --  4.8*  --  4.6*   LABGLOM 16*   < > 15*   < > 15*   GLUCOSE 862*  --  100  --  79   CALCIUM 8.4*  --  8.7  --  8.3*   PHOS  --   --  5.9*  --   --     < > = values in this interval not displayed. Recent Labs     12/15/21  0415 12/15/21  0722   MG 1.9 2.0   PHOS 5.9*  --      Lab Results   Component Value Date    ANIONGAP 13.0 12/15/2021     Recent Labs     12/14/21  1914 12/15/21  0420   WBC 8.1 9.1   RBC 3.07* 3.29*   HGB 8.7* 9.3*   HCT 27.2* 28.6*   * 502*      Recent Labs     12/14/21 1914   LABALBU 2.5*     Results for Priya Frey (MRN [id number]) as of 12/15/2021 10:18   Ref. Range 12/14/2021 19:14   Beta-Hydroxybutyrate Latest Ref Range: 0.20 - 2.81 mg/dl 0.91      XR CHEST PORTABLE  Result Date: 12/14/2021  Negative chest     Renal US from [facility] 12/9/2021  The bilateral kidneys are normal in size, contour, and position. There is no   hydronephrosis. The echogenicity of the renal cortices appear normal relative   to the echogenicity of the liver. No solid or cystic intrarenal masses are   noted. No perinephric fluid collections or masses are present. The right   kidney measures 9.3 cm. in length by 5.2 cm in height and 3.7 cm in width with   a renal cortical thickness of 1.5 cm. and a resistive index of 0.8  The left   kidney measures 9.0 cm. in length by 4.3 cm in height and 4.0 cm in width with   a renal cortical thickness of 1.2 cm. and a resistive index of 0.8.      Conclusion: 12/9/2021   Technically satisfactory Echocardiography    Normal Left Ventricular size and function, diastolic function   is normal    Normal Aortic Root    No intracardiac masses or thrombi    Mild concentric left ventricular hypertrophy noted.  Normal Right ventricle Size and function.  Left ventricular ejection fraction est 65%    Normal Mitral Valve     Aortic valve. with small echogenic structure on the ventricular   surface, suspicious for vegetation    Normal Tricuspid valve.  Trace mitral regurgitation    Trace to mild tricuspid regurgitation    Normal color flow across Aortic and pulmonic valve    Normal left Ventricular Diastolic function.  Trivial circumferential pericardial effusion noted without   tamponade physiology    Estimated right ventricular systolic pressure 40 mmHg    Normal LA Size     ASSESSMENT  1. Hyperosmolar non ketotic hyperglycemia, likely intravascular volume depleted, but significant peripheral edema. Renal US ok on 12/9/2021. Check Urine protein/urine creatinine ratio. Continue Bicarb drip. Correct hyperglycemia first before considering dialysis. Will monitor closely. 2. DM type 1 with hyperglycemia  3. Normal anion gap metabolic acidosis likely 2nd to Acute Kidney Injury/Chronic Kidney Disease on bicarb drip. Add PO bicarb 1300 mg x 3 today  4. Hyperkalemia 2nd to MONICA/CKD  5. Hyperphosphatemia 2nd to Acute Kidney Injury /Chronic Kidney Disease   6. 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 Anemia of chronic disease and iron deficiency. Recent Venofer dosing at [facility]  8. Essential Hypertension with nephrosclerosis, worse with fluid overload    Active Problems:    DKA, type 1, not at goal [facility])  Resolved Problems:    * No resolved hospital problems. *    Discussed with Dr. Rodriguez Lynn. Patient was advised about impression and plan. Patient verbalizes understanding and agrees with plan of care.      Thank you Roger Hancock MD  for allowing us to participate in care of LAVERNE Braswell - CNP 12/15/2021 10:00 AM

## 2021-12-15 NOTE — ED NOTES
ED to inpatient nurses report    Chief Complaint   Patient presents with    Other     Abnormal Labs      Present to ED from home  LOC: alert and orientated to name, place, date  Vital signs   Vitals:    12/14/21 2104 12/14/21 2124 12/14/21 2240 12/14/21 2341   BP: (!) 174/98 (!) 165/96 (!) 161/103 (!) 170/94   Pulse: 88 98 97 94   Resp: 18 18 18 18   Temp:       TempSrc:       SpO2: 100% 99% 98% 98%   Weight:       Height:          Oxygen Baseline room air     Current needs required none   LDAs:   Peripheral IV 12/14/21 Left Antecubital (Active)   Site Assessment Clean; Dry; Intact 12/14/21 1853   Line Status Normal saline locked; Blood return noted;  Flushed; Specimen collected 12/14/21 1853   Dressing Status Clean; Dry; Intact 12/14/21 1853   Dressing Intervention New 12/14/21 1853     Mobility: Requires assistance * 1  Pending ED orders: need UA  Present condition: STABLE      Electronically signed by Sheela Sher RN on 12/15/2021 at 12:35 AM     Sheela Sher RN  12/15/21 0036

## 2021-12-15 NOTE — PROGRESS NOTES
Spoke with Dr. Phil Jimenez regarding changing the patient from vancomycin 750 mg q48h that the patient was receiving in an infusion center for MSSA endocarditis to nafcillin 2 g q4h. Unfortunately, the patient has CKD with some worsening of his SCr while receiving vancomycin and trying to keep the patient off of dialysis if possible. However, IV mini-bags are on shortage and nafcillin would require 6 bags in 24 hours. Considered cefazolin which would require adjustment for renal function vs nafcillin which is hepatically metabolized. Asked Dr. Phil Jimenez what IV antibiotic the patient would be potentially discharged on since little benefit would be seen if patient was discharged back on vancomycin. Dr. Phil Jimenez stated he would likely try to discharge with nafcillin. Patient is potentially up for transfer to [place] due to [facility] [address]. Angelina's current patient capacity and ID is consulted to see. Ultimately, determined the best antibiotic option at this time would be nafcillin. Vancomycin random level on 12/15/21 at 11:33 am was 24.6. Anticipate patient having therapeutic vancomycin level > 15 until tomorrow afternoon. Nafcillin adjusted to start 12/16/21 at 8613 to avoid duplicate MSSA coverage.      Kelly Steward, PharmD 12/15/2021  5:38 PM

## 2021-12-15 NOTE — CONSULTS
[facility] Floor EMERGENCY DEPT  [address] [place] [id number]  Dept: [phone]  Loc: [phone]  Visit Date: 12/14/2021    Urology Consult Note    Reason for Consult:  renal failure, extensive scrotal/penis edema, needsd parker, nursing unable to place  Requesting Physician:  Newton Chandra MD    History Obtained From:  Patient, EMR, nurse    Chief Complaint: Worsening renal failure, hyperkalemia, hyperkalemia    HISTORY OF PRESENT ILLNESS:                The patient is a 27 y.o. male with significant past medical history of diabetes mellitus type 2 with diabetic neuropathy and nephropathy with CKD stage III and IV who recently moved up here from [place] about 5 months ago with a questionable history of substance abuse or IV drug use who was just recently diagnosed with infective endocarditis at [facility] and discharged a couple of days ago on a 6-week outpatient IV vancomycin treatment to be given at the [facility] outpatient infusion center, who was sent to our emergency room department here at [facility] on 12/14/2021 from the outpatient infusion center at [facility] after patient was found to have worsening of his renal function with hyperkalemia, acidemia, and hypervolemia. Potassium 5.7, blood sugar 870, and serum creatinine of 4.7 from a baseline of 3.2 at the outpatient infusion center. On-call physician or hospitalist at [facility] was then called to admit patient however, due to no nephrology service and no hemodialysis available at their hospital, attempted to transfer patient to hospital where dialysis is available. However, no local beds available so patient instructed to go directly to the emergency room for emergent care.   Urology consulted for renal failure, extensive scrotal/penis edema, needsd parker, nursing unable to place    Ua shows no infection  Denies problems voiding prior    Past Medical History:        Diagnosis Date    Diabetes ([address]     Foot drop, left foot     Kidney failure     STAGE 4    Neuropathy      Past Surgical History:    No past surgical history on file. Allergies:  Patient has no known allergies. Social History:  Social History     Socioeconomic History    Marital status: Single     Spouse name: Not on file    Number of children: Not on file    Years of education: Not on file    Highest education level: Not on file   Occupational History    Not on file   Tobacco Use    Smoking status: Current Some Day Smoker     Types: Cigarettes    Smokeless tobacco: Never Used   Vaping Use    Vaping Use: Every day   Substance and Sexual Activity    Alcohol use: Never    Drug use: Yes     Types: Marijuana Lizette Tony)    Sexual activity: Not Currently   Other Topics Concern    Not on file   Social History Narrative    Not on file     Social Determinants of Health     Financial Resource Strain:     Difficulty of Paying Living Expenses: Not on file   Food Insecurity:     Worried About Running Out of Food in the Last Year: Not on file    Cameron of Food in the Last Year: Not on file   Transportation Needs:     Lack of Transportation (Medical): Not on file    Lack of Transportation (Non-Medical):  Not on file   Physical Activity:     Days of Exercise per Week: Not on file    Minutes of Exercise per Session: Not on file   Stress:     Feeling of Stress : Not on file   Social Connections:     Frequency of Communication with Friends and Family: Not on file    Frequency of Social Gatherings with Friends and Family: Not on file    Attends Judaism Services: Not on file    Active Member of Clubs or Organizations: Not on file    Attends Club or Organization Meetings: Not on file    Marital Status: Not on file   Intimate Partner Violence:     Fear of Current or Ex-Partner: Not on file    Emotionally Abused: Not on file    Physically Abused: Not on file    Sexually Abused: Not on file   Housing Stability:     Unable to Pay for Housing in the Last Year: Not on file    Number of Places Lived in the Last Year: Not on file    Unstable Housing in the Last Year: Not on file     Family History:   No family history on file. ROS:  Unable to perform, some confusion    PHYSICAL EXAM:  VITALS:  BP (!) 137/91   Pulse 86   Temp 98 °F (36.7 °C) (Oral)   Resp 18   Ht 5' 3\" (1.6 m)   Wt 115 lb (52.2 kg)   SpO2 98%   BMI 20.37 kg/m² . Nursing note and vitals reviewed. Constitutional: Alert, slow to react, no acute distress, and cooperative to examination with appropriate mood and affect. HEENT:   Head:         Normocephalic and atraumatic. Mouth/Throat:          Mucous membranes are normal.   Eyes:         EOM are normal. No scleral icterus. Nose:    The external appearance of the nose is normal  Ears: The ears appear normal to external inspection. Cardiovascular:       Normal rate, regular rhythm. Pulmonary/Chest:  Normal respiratory rate and rhthym. No use of accessory muscles. Lungs clear bilaterally. Abdominal:          Distended, some pain on palpation.   Able to palpate bladder           Genitalia:    Penile swelling, scrotal swelling, resistance at meatus  Extremities:    3+edema in BLE to knee  Neurological:    Alert, somnolent, slow to react    DATA:  CBC:   Lab Results   Component Value Date    WBC 9.1 12/15/2021    RBC 3.29 12/15/2021    HGB 9.3 12/15/2021    HCT 28.6 12/15/2021    MCV 86.9 12/15/2021    MCH 28.3 12/15/2021    MCHC 32.5 12/15/2021     12/15/2021    MPV 11.5 12/15/2021     BMP:    Lab Results   Component Value Date     12/15/2021    K 4.4 12/15/2021     12/15/2021    CO2 14 12/15/2021    BUN 75 12/15/2021    CREATININE 4.6 12/15/2021    CALCIUM 8.3 12/15/2021    LABGLOM 15 12/15/2021    GLUCOSE 79 12/15/2021     BUN/Creatinine:    Lab Results   Component Value Date    BUN 75 12/15/2021    CREATININE 4.6 12/15/2021     Magnesium:    Lab

## 2021-12-15 NOTE — H&P
Hospitalist History and Physical          Patient: Chetan Greenberg  : 1995  MRN: [id number]     Acct: [de-identified]    PCP: No primary care provider on file. Date of Admission: 2021  Date of Service: Pt seen/examined on 12/15/21  and Admitted to Inpatient with expected LOS greater than two midnights due to medical therapy. Hospital Problems           Last Modified POA    DKA, type 1, not at goal [facility]) 12/15/2021 Yes          Assessment and Plan:    DKA versus HHS:  Type 1 diabetes mellitus per patient's report, however negative beta hydroxybutyrate. Questionable compliance with insulin. Initial serum bicarb of 15 with an anion gap of 14. Negative beta hydroxybutyrate. HHS more likely but will start treatment using DKA protocol. Start patient on insulin gtt per DKA protocol with every hour Accu-Cheks. Serial electrolyte and BMP checks per DKA protocol starting with every 4 hours for now. IV fluids per DKA protocol and add D5 once blood sugars below 250. Once blood sugars normalize, likely to stop DKA protocol even if anion gap not normalized. Infective endocarditis:  Recent diagnosis at [facility].  Patient continues to adamantly deny IV drug use. Continue IV vancomycin x6 weeks total course per cardiology at [facility]. Need to obtain records including DIANA or TTE report. Pharmacy consultation for Vanco dosing and follow-up. MONICA on CKD stage IV:  Serum creatinine of 4.7 from baseline of 3.2. Patient also with hyperkalemia, acidemia, and hypervolemia. Concern for possible need for emergent hemodialysis pretty soon if no improvement of all 3 indices above. Start sodium bicarb tablets and consider IV bicarb drip. Nephrology consultation in place to consider hemodialysis. Follow strict ins and outs and daily weights. Avoid nephrotoxic medications and adjust meds for renal dosing. Acidemia and hyperkalemia should improve with treatment of DKA.     Elevated troponins:  Likely demand ischemia that is secondary to severe hypertension and MONICA. Continue serial troponins, repeat EKG in a.m., telemetry monitoring. Full dose aspirin for now. Anemia:  Likely anemia of renal failure. Defer to nephrology to start Epogen shots. Diabetic neuropathy:  Renally adjusted gabapentin. Psychiatry: On multiple psychotropic meds for treatment of depression/bipolar. Continue Seroquel and Prozac with renal dose adjustment. DVT prophylaxis:  Subcu Lovenox. Fluid/electrolyte/nutrition:  Initiated fluids at lower rate than DKA protocol using 75 cc an hour. Patient with good urine output and made 800 cc in the ER. Increased IV fluids to 150 cc an hour. Pseudohyponatremia should correct with correction of serum blood sugars. Hyperkalemia due to MONICA should also improve with correction of blood sugars and improvement of renal function. Serial electrolytes and renal function to be monitored. Clear liquid diet for now and advance as tolerated.      =======================================================================      Chief Complaint: Worsening renal failure, hyperkalemia, hyperkalemia. History Of Present Illness:  Analia Pink is a 27 y.o. male with PMHx of diabetes mellitus type 2 with diabetic neuropathy and nephropathy with CKD stage III and IV who recently moved up here from [place] about 5 months ago with a questionable history of substance abuse or IV drug use who was just recently diagnosed with infective endocarditis at [facility] and discharged a couple of days ago on a 6-week outpatient IV vancomycin treatment to be given at the [facility] outpatient infusion center, who was sent to our emergency room department here at [facility] on 12/14/2021 from the outpatient infusion center at [facility] after patient was found to have worsening of his renal function with hyperkalemia, acidemia, and hypervolemia.     Potassium 5.7, of him. Tobacco use:   reports that he has been smoking cigarettes. He has never used smokeless tobacco.  Alcohol use:   reports no history of alcohol use. Drug use:  reports current drug use. Drug: Marijuana Berneta Gary). Family History:   as follows:  No family history on file. Review of Systems:   Pertinent positives and negatives as noted in the HPI. All other systems reviewed and negative. Physical Exam:    BP (!) 177/99   Pulse 89   Temp 98 °F (36.7 °C) (Oral)   Resp 18   Ht 5' 3\" (1.6 m)   Wt 115 lb (52.2 kg)   SpO2 99%   BMI 20.37 kg/m²       General appearance: Somnolent and slow to react but appropriate and oriented. No apparent distress, well developed, appears stated age. Eyes:  Pupils equal, round, and reactive to light. Conjunctivae/corneas clear. HENT: Head normal in appearance. External nares normal.  Oral mucosa moist without lesions. Hearing grossly intact. Neck: Supple, with full range of motion. Trachea midline. No gross JVD appreciated. Respiratory:  Normal respiratory effort. Clear to auscultation, bilaterally without rales or wheezes or rhonchi. Cardiovascular: Normal rate, regular rhythm with normal S1/S2 without murmurs. 3+ bilateral lower extremity pitting edema up to the knees  Abdomen: Soft, non-tender, non-distended with normal bowel sounds. Musculoskeletal: There is no joint swelling or tenderness. Normal tone. No abnormal movements. Skin: Warm and dry. No rashes or lesions. Neurologic:  No focal sensory/motor deficits in the upper and lower extremities. Cranial nerves:  grossly non-focal 2-12. Psychiatric: Alert and oriented, normal insight and thought content. Capillary Refill: Brisk,< 3 seconds. Peripheral Pulses: 1+ dorsalis pedis pulses, equal bilaterally.      Labs:     Recent Labs     12/14/21  1914 12/15/21  0420   WBC 8.1 9.1   HGB 8.7* 9.3*   HCT 27.2* 28.6*   * 502*     Recent Labs     12/14/21 1914   *   K 5.9*   CL 97*   CO2 15*   BUN 77*   CREATININE 4.5*   CALCIUM 8.4*     Recent Labs     12/14/21 1914   AST 28   ALT 57   BILIDIR <0.2   BILITOT <0.2*   ALKPHOS 98     No results for input(s): INR in the last 72 hours. No results for input(s): Freddy Shackle in the last 72 hours. Lab Results   Component Value Date    NITRU NEGATIVE 12/15/2021    WBCUA 5-9 12/15/2021    BACTERIA NONE SEEN 12/15/2021    RBCUA 0-2 12/15/2021    BLOODU SMALL 12/15/2021    GLUCOSEU >= 1000 12/15/2021         Radiology:     XR CHEST PORTABLE   Final Result   Negative chest            **This report has been created using voice recognition software. It may contain minor errors which are inherent in voice recognition technology. **      Final report electronically signed by Dr. Lien Gallardo on 12/14/2021 8:55 PM             EKG:  I have reviewed the EKG with the following interpretation: Sinus rhythm. PT/OT Eval Status:  will be assessed  Diet: Diet NPO  DVT prophylaxis: Subcu heparin. Code Status: Full Code  Disposition: admit to stepdown, inpatient, telemetry. Thank you No primary care provider on file. for the opportunity to be involved in this patient's care.     Electronically signed by Kathy Hall MD on 12/15/2021 at 4:49 AM.

## 2021-12-15 NOTE — CONSULTS
[facility]  Cardiology Consult      Patient:  Briseyda Carranza  YOB: 1995    MRN: [id number]   Acct: [de-identified]     Primary Care Physician: No primary care provider on file. REASON FOR CONSULT:    aortic valve endocarditis, suspect acue chf     CHIEF COMPLAINT:    Swelling     HISTORY OF PRESENT ILLNESS:    Briseyda Carranza is a pleasant 27year old male patient with past medical history that includes:   Past Medical History:   Diagnosis Date    Diabetes ([address]     Foot drop, left foot     Kidney failure     STAGE 4    Neuropathy    He has known h/o DM, CKD. Questionable history of IV drug abuse was documented in the chart, patient denies. He admits to Methamphetamine abuse. The patient was recently admitted to OSH with AMS, MONICA/CKD, DKA. Patient required intubation. During his hospital stay, the patient was diagnosed with MSSA Bacteremia, Endocarditis. TTE report reviewed, EF is preserved, a small echogenic mass on aortic valve was noticed. Hutchinson catheter was placed. He was discharged home with plan to to be treated with IV Vancomycin. Cardiology was consulted for aortic valve endocarditis with concern for CHF, ?worsening valvular heart disease. The patient reports increased SOB and anasarca. Patient denies chest pain, paroxysmal nocturnal dyspnea, palpitations, dizziness, syncope. Cr 2.5. K 5.9.      All labs, EKG's, diagnostic testing and images as well as cardiac cath, stress testing   were reviewed during this encounter    CARDIAC TESTING  Echo:   D Impression     Technical quality: Good     Left ventricle: Normal internal dimensions with mild concentric hypertrophy   The estimated LVEF is 65%   Left atrium: Normal size  Right ventricle: Normal size and function   Right atrium: Normal size  Aortic root: Normal   Pericardium: Real circumferential pericardial effusion  Mitral valve: No prolapse  Aortic valve: Trileaflet  Tricuspid valve: Normal  Pulmonic valve: Poorly visualized    Doppler Impression ( If Performed )  Mitral valve: Trace regurgitation  Tricuspid valve: Trace -mild regurgitation  Aortic valve: Normal color flow   Pulmonic valve: Normal Color Flow   Left ventricular diastolic function is normal for age    Conclusion:    Technically satisfactory Echocardiography   Normal Left Ventricular size and function, diastolic function is normal   Normal Aortic Root   No intracardiac masses or thrombi   Mild concentric left ventricular hypertrophy noted.  Normal Right ventricle Size and function.  Left ventricular ejection fraction est 65%   Normal Mitral Valve  Aortic valve. with small echogenic structure on the ventricular surface, suspicious for vegetation   Normal Tricuspid valve.  Trace mitral regurgitation   Trace to mild tricuspid regurgitation   Normal color flow across Aortic and pulmonic valve   Normal left Ventricular Diastolic function.  Trivial circumferential pericardial effusion noted without tamponade physiology   Estimated right ventricular systolic pressure 40 mmHg   Normal LA Size     Clinical correlation needed with respect to the aortic valve. Electronically signed by Moises Haque DO on 12/9/2021 at 10:46 AM.        Past Medical History:    Past Medical History:   Diagnosis Date    Diabetes ([address]     Foot drop, left foot     Kidney failure     STAGE 4    Neuropathy        Past Surgical History:    No past surgical history on file. Medications Prior to Admission:    Not in a hospital admission. Allergies:    Patient has no known allergies. Social History:    reports that he has been smoking cigarettes. He has never used smokeless tobacco. He reports current drug use. Drug: Marijuana Jennifer Amble). He reports that he does not drink alcohol. Family History:   family history is not on file.       REVIEW OF SYSTEMS:  Constitutional: negative for anorexia, chills and fevers,weight change  Skin: negative for new skin rash per patient  HEENT: negative for head trauma or new visual changes  Respiratory: negative for cough, hemoptysis, wheezing  Cardiovascular: negative for  orthopnea, palpitations and syncope. Gastrointestinal: negative for abdominal pain,nausea , vomiting, constipation, diarrhea. Hematologic/lymphatic: negative for bruising,prolonged bleeding,blood clots  Musculoskeletal:negative for muscle weakness, myalgias,wasting  Neurological: negative for coordination problems, dizziness, gait problems and vertigo  Behavioral/Psych:negative for mood/sleep disturbance      PHYSICAL EXAM:   Vitals:  Patient Vitals for the past 24 hrs:   BP Temp Temp src Pulse Resp SpO2 Height Weight   12/15/21 1012 (!) 137/91 -- -- 86 18 98 % -- --   12/15/21 0621 (!) 138/91 -- -- 83 -- 99 % -- --   12/15/21 0551 (!) 158/97 -- -- 85 -- 99 % -- --   12/15/21 0451 137/79 -- -- 87 -- 99 % -- --   12/15/21 0352 -- -- -- -- 18 -- -- --   12/15/21 0351 (!) 177/99 -- -- 89 -- 99 % -- --   12/15/21 0238 (!) 140/83 -- -- 86 -- 99 % -- --   12/15/21 0155 -- 98 °F (36.7 °C) Oral -- -- -- -- --   12/15/21 0136 (!) 167/99 -- -- 93 -- 99 % -- --   12/15/21 0043 (!) 161/102 -- -- 97 18 98 % -- --   12/14/21 2341 (!) 170/94 -- -- 94 18 98 % -- --   12/14/21 2240 (!) 161/103 -- -- 97 18 98 % -- --   12/14/21 2124 (!) 165/96 -- -- 98 18 99 % -- --   12/14/21 2104 (!) 174/98 -- -- 88 18 100 % -- --   12/14/21 2013 (!) 205/107 -- -- 87 18 100 % -- --   12/14/21 1845 (!) 187/98 97.9 °F (36.6 °C) Oral 88 18 100 % 5' 3\" (1.6 m) 115 lb (52.2 kg)       Last 3 weights: Wt Readings from Last 3 Encounters:   12/14/21 115 lb (52.2 kg)     24 hour intake/output:    Intake/Output Summary (Last 24 hours) at 12/15/2021 1222  Last data filed at 12/15/2021 0243  Gross per 24 hour   Intake 55.37 ml   Output 800 ml   Net -744.63 ml     BMI:Body mass index is 20.37 kg/m².     General Appearance: alert and oriented to person, place and time, well developed and well- nourished, in no acute distress  Skin: warm and dry, no rash or erythema  Eyes: pupils equal, round, and reactive to light, extraocular eye movements intact, conjunctivae normal  Neck: supple and non-tender without mass, no thyromegaly or thyroid nodules, no cervical lymphadenopathy  Pulmonary/Chest: clear to auscultation bilaterally- no wheezes, rales or rhonchi, normal air movement, no respiratory distress  Cardiovascular: normal rate, regular rhythm, normal S1 and S2, II/VI diastolic murmur. No rubs, clicks, or gallops, distal pulses intact, no carotid bruits, Negative JVD  Radial Pulses: intact 2+  Abdomen: soft, non-tender, non-distended, normal bowel sounds, no masses or organomegaly  Extremities: no cyanosis, clubbing . +1 Edema  Musculoskeletal: normal range of motion, no joint swelling, deformity or tenderness      RADIOLOGY   XR CHEST PORTABLE    Result Date: 12/14/2021  PROCEDURE: XR CHEST PORTABLE CLINICAL INFORMATION: dka; fluid retention . TECHNIQUE: Portable upright COMPARISON: No prior study. FINDINGS: Heart and mediastinal hilar contours are normal. No infiltrates or effusions. Vascularity is normal.     Negative chest **This report has been created using voice recognition software. It may contain minor errors which are inherent in voice recognition technology. ** Final report electronically signed by Dr. Jorge Luis Holland on 12/14/2021 8:55 PM      LABS:  Recent Labs     12/14/21 1914   TROPONINT 0.117*     CBC:   Lab Results   Component Value Date    WBC 9.1 12/15/2021    RBC 3.29 12/15/2021    HGB 9.3 12/15/2021    HCT 28.6 12/15/2021    MCV 86.9 12/15/2021    MCH 28.3 12/15/2021    MCHC 32.5 12/15/2021     12/15/2021    MPV 11.5 12/15/2021     BMP:    Lab Results   Component Value Date     12/15/2021    K 4.4 12/15/2021     12/15/2021    CO2 14 12/15/2021    BUN 75 12/15/2021    LABALBU 2.5 12/14/2021    CREATININE 4.6 12/15/2021    CALCIUM 8.3 12/15/2021    LABGLOM 15 12/15/2021    GLUCOSE 79 12/15/2021     Hepatic Function Panel:    Lab Results   Component Value Date    ALKPHOS 98 12/14/2021    ALT 57 12/14/2021    AST 28 12/14/2021    PROT 5.5 12/14/2021    BILITOT <0.2 12/14/2021    BILIDIR <0.2 12/14/2021    LABALBU 2.5 12/14/2021     Magnesium:    Lab Results   Component Value Date    MG 2.0 12/15/2021     Warfarin PT/INR:  No components found for: PTPATWAR, PTINRWAR  HgBA1c:  No results found for: LABA1C  FLP:  No results found for: TRIG, HDL, LDLCALC, LDLDIRECT, LABVLDL  TSH:  No results found for: TSH  BNP: No results found for: BNP      ASSESSMENT:  1. Aortic valve vegetation  2. MSSA Bacteremia   3. Infective Endocarditis   4. Troponin elevation   5. MONICA  6. CKD  7. Hyperkalemia  8. Edema/Anasarca  9. ?CHF  10. DM  11. Anemia     RECOMMENDATIONS:   Troponin 0.11   He denies chest pain    EKG is c/w NSR, no acute ischemic changes    Elevated Troponin is probably 2/2 Type MI in setting of MONICA/CKD, DKA, infection   Monitor on telemetry   Obtain serial cardiac enzymes. If Troponin is trending up and/or patient develops chest pains, start Heparin gtt and inform cardiology    TTE report reviewed, EF is preserved, a small echogenic mass on aortic valve was noticed. Hutchinson catheter was placed  [address] Cardiology was consulted for aortic valve endocarditis with concern for CHF, ?worsening valvular heart disease   DIANA tomorrow    R/B/I/A of DIANA were d/w patient, he agrees to proceed   [facility] Nephrology was consulted    Above findings and plan of care were discussed with patient, questions were answered, agreeable to plan. Thank you for allowing me to participate in the care of this patient. Please let me know if I can be of any further assistance.       Buckley Sacks, MD, [facility], [place]   12:22 PM  12/15/2021

## 2021-12-15 NOTE — ED NOTES
Pt updated with plan of care. Confirmed with patient taking 200mg lyrica 2xday. Pharmacy notified. Pt denies needs at this time. Will continue to monitor.       Jonathan Williamson RN  12/15/21 7939

## 2021-12-15 NOTE — ED NOTES
This RN along with CARSON Petty attempted to insert a parker catheter on this pt. This RN attempted 3 times. Was unsuccessful with a 12g parker. Dr. Hans Leiva contacted. Provider states he will place a consult for urology.       Gregorio Cho RN  12/15/21 9791

## 2021-12-15 NOTE — ED NOTES
Pt resting on cot. Updated on POC. Pt verbalized understanding. Call light in reach. Family at bedside.  Pt provided with urinal and told that a urine sample was needed     Honor CARSON Butts  12/14/21 1702

## 2021-12-15 NOTE — ED NOTES
Pt up to BR, with steady gait, nurse standby assist only. pericare provided, linens changed. Awaiting inpatient bed assignment. Denies further needs at this time.      Terrell Ann RN  12/15/21 4348

## 2021-12-15 NOTE — PROGRESS NOTES
Hospitalist Progress Note    Patient:  Jeff Beckham    YOB: 1995  Unit/Bed:39/039A  MRN: [id number]    Acct: [de-identified]   PCP: No primary care provider on file. Date of Admission: 12/14/2021      Assessment/Plan:  DKA versus HHS:  Type 1 diabetes mellitus per patient's report, however negative beta hydroxybutyrate. Questionable compliance with insulin. Initial serum bicarb of 15 with an anion gap of 14. Negative beta hydroxybutyrate. HHS more likely but will start treatment using DKA protocol. IV fluids D5 started  POCT 209 12/15, starting 5 units Lantus BID    Infective endocarditis:   Recent diagnosis at [facility].  Patient continues to adamantly deny IV drug use. Need to obtain records including DIANA or TTE report. Stop Vancomycin  Consult Dr. Roge Sanches from Infectious Disease, appreciate antibiotic recommendations.     MONICA on CKD stage IV:  Serum creatinine of 4.7 from baseline of 3.2. Patient also with hyperkalemia, acidemia, and hypervolemia. Concern for possible need for emergent hemodialysis pretty soon if no improvement of all 3 indices above. Start sodium bicarb tablets and consider IV bicarb drip. Nephrology consultation in place to consider hemodialysis. Follow strict ins and outs and daily weights. Avoid nephrotoxic medications and adjust meds for renal dosing. Acidemia and hyperkalemia should improve with treatment of DKA.     Elevated troponins:  Likely demand ischemia that is secondary to severe hypertension and MONICA. DIANA by Cardiology today  Full dose aspirin for now.     Anemia:  Likely anemia of renal failure. Defer to nephrology to start Epogen shots.     Diabetic neuropathy:  Renally adjusted gabapentin.     Psychiatry: On multiple psychotropic meds for treatment of depression/bipolar.   Continue Seroquel and Prozac with renal dose adjustment.     DVT prophylaxis:  Subcu Lovenox.     Fluid/electrolyte/nutrition:  Increased IV fluids to 150 cc an hour.   Pseudohyponatremia should correct with correction of serum blood sugars. Hyperkalemia resolving, Potassium 4.4 12/15  Serial electrolytes and renal function to be monitored. Clear liquid diet for now and advance as tolerated. Expected discharge date:  TBD    Disposition:   [] Home  [] TCU  [] Rehab  [] Psych  [] SNF  [] [place]  [] Other-    ===================================================================  Subjective:    12/15:   Patient seen and examined at bedside. Patient states he is feeling somewhat better compared to yesterday. He does state he is having increased scrotal pain due to swelling. He denies N/V/F/SOB/C/CP. Chief Complaint: Worsening renal failure, hyperkalemia, hyperkalemia.     History Of Present Illness:  Dorothea Calderon is a 27 y.o. male with PMHx of diabetes mellitus type 2 with diabetic neuropathy and nephropathy with CKD stage III and IV who recently moved up here from [place] about 5 months ago with a questionable history of substance abuse or IV drug use who was just recently diagnosed with infective endocarditis at [facility] and discharged a couple of days ago on a 6-week outpatient IV vancomycin treatment to be given at the [facility] outpatient infusion center, who was sent to our emergency room department here at [address] [address] on 12/14/2021 from the outpatient infusion center at [facility] after patient was found to have worsening of his renal function with hyperkalemia, acidemia, and hypervolemia. Potassium 5.7, blood sugar 870, and serum creatinine of 4.7 from a baseline of 3.2 at the outpatient infusion center. On-call physician or hospitalist at [facility] was then called to admit patient however, due to no nephrology service and no hemodialysis available at their hospital, attempted to transfer patient to hospital where dialysis is available.   However, no local beds available so patient instructed to go directly to the emergency room for emergent care.     In the emergency room patient was hypertensive up to 208/107, tachycardic 88, tachypneic 18, 97.9 temperature with her percent oxygen saturation on room air.       The patient had a negative urine drug screen. Hyponatremia 126, hyperkalemia 5.9, serum creatinine 4.5, with a glucose of 862. proBNP 4931, troponin 0 0.117, beta hydroxybutyrate 0.941     Patient given hydralazine 10 mg IV x1 and 5 units of intravenous regular insulin, 1 L of normal saline IV fluid bolus. Medications:  Reviewed    Infusion Medications    dextrose 5 % and 0.45 % NaCl 150 mL/hr (12/15/21 9937)    sodium bicarbonate infusion 50 mL/hr at 12/15/21 0401     Scheduled Medications    FLUoxetine  20 mg Oral Nightly    pregabalin  100 mg Oral Daily    QUEtiapine  25 mg Oral Nightly    heparin (porcine)  5,000 Units SubCUTAneous 3 times per day    vancomycin (VANCOCIN) intermittent dosing (placeholder)   Other RX Placeholder    insulin glargine  5 Units SubCUTAneous BID     PRN Meds: insulin regular, dextrose, sodium phosphate IVPB **OR** sodium phosphate IVPB **OR** sodium phosphate IVPB, polyethylene glycol, dextrose 5 % and 0.45 % NaCl, nicotine polacrilex      ROS: reviewed from prior note, full ROS unchanged unless otherwise stated in hospital course/subjective portion. Intake/Output Summary (Last 24 hours) at 12/15/2021 1330  Last data filed at 12/15/2021 1158  Gross per 24 hour   Intake 55.37 ml   Output 2100 ml   Net -2044.63 ml       Exam:  BP (!) 137/91   Pulse 86   Temp 98 °F (36.7 °C) (Oral)   Resp 18   Ht 5' 3\" (1.6 m)   Wt 115 lb (52.2 kg)   SpO2 98%   BMI 20.37 kg/m²     General appearance: Somnolent and slow to react but appropriate and oriented. No apparent distress, well developed, appears stated age. Eyes:  Pupils equal, round, and reactive to light. Conjunctivae/corneas clear. HENT: Head normal in appearance.  External nares normal.  Oral mucosa moist without lesions. Hearing grossly intact. Neck: Supple, with full range of motion. Trachea midline. No gross JVD appreciated. Respiratory:  Normal respiratory effort. Clear to auscultation, bilaterally without rales or wheezes or rhonchi. Cardiovascular: Normal rate, regular rhythm with normal S1/S2 without murmurs. 3+ bilateral lower extremity pitting edema up to the knees  Abdomen: Soft, non-tender, non-distended with normal bowel sounds. Musculoskeletal: There is no joint swelling or tenderness. Normal tone. No abnormal movements. Skin: Warm and dry. No rashes or lesions. Genitourinary: Scrotal edema noted, severe. Neurologic:  No focal sensory/motor deficits in the upper and lower extremities. Cranial nerves:  grossly non-focal 2-12. Psychiatric: Alert and oriented, normal insight and thought content. Capillary Refill: Brisk,< 3 seconds. Peripheral Pulses: 1+ dorsalis pedis pulses, equal bilaterally. Labs:   Recent Labs     12/14/21  1914 12/15/21  0420   WBC 8.1 9.1   HGB 8.7* 9.3*   HCT 27.2* 28.6*   * 502*     Recent Labs     12/14/21  1914 12/15/21  0415 12/15/21  0722   * 136 135   K 5.9* 4.5 4.4   CL 97* 106 108   CO2 15* 15* 14*   BUN 77* 75* 75*   CREATININE 4.5* 4.8* 4.6*   CALCIUM 8.4* 8.7 8.3*   PHOS  --  5.9*  --      Recent Labs     12/14/21 1914   AST 28   ALT 57   BILIDIR <0.2   BILITOT <0.2*   ALKPHOS 98     No results for input(s): INR in the last 72 hours. No results for input(s): Bee Risk in the last 72 hours. No results for input(s): PROCAL in the last 72 hours. Lab Results   Component Value Date    NITRU NEGATIVE 12/15/2021    WBCUA 5-9 12/15/2021    BACTERIA NONE SEEN 12/15/2021    RBCUA 0-2 12/15/2021    BLOODU SMALL 12/15/2021    GLUCOSEU >= 1000 12/15/2021       Radiology (48 hours):  XR CHEST PORTABLE    Result Date: 12/14/2021  Negative chest **This report has been created using voice recognition software.   It may contain minor errors which are inherent in voice recognition technology. ** Final report electronically signed by Dr. Alisha Paiz on 12/14/2021 8:55 PM       DVT prophylaxis:    [] Lovenox  [] SCDs  [x] SQ Heparin  [] Encourage ambulation   [] Already on Anticoagulation       Diet: Diet NPO  Code Status: Full Code  PT/OT: None  Tele: Yes  IVF: D5W    Electronically signed by Hoda Monteiro DPM on 12/15/2021 at 1:30 PM

## 2021-12-15 NOTE — ED NOTES
Spoke with Dr. Joseph Link and updated with urine output of 800ml. Orders to increase d51/2NS to 150ml/hr and insulin continues at same rate.      Rosa Bradshaw RN  12/15/21 7693       Rosa Bradshaw RN  12/15/21 3117

## 2021-12-15 NOTE — ED NOTES
Med requested for pharmacy to replace k+, perfect serve message to sent to Dr. Gladis Bañuelos to review.       Lucretia Novak RN  12/15/21 9745

## 2021-12-15 NOTE — ED NOTES
ED nurse-to-nurse bedside report    Chief Complaint   Patient presents with    Other     Abnormal Labs      LOC: alert and orientated to name, place, date  Vital signs   Vitals:    12/15/21 0451 12/15/21 0551 12/15/21 0621 12/15/21 1012   BP: 137/79 (!) 158/97 (!) 138/91 (!) 137/91   Pulse: 87 85 83 86   Resp:    18   Temp:       TempSrc:       SpO2: 99% 99% 99% 98%   Weight:       Height:          Pain:    Pain Interventions: see MAR  Pain Goal: 2/10  Oxygen: No    Current needs required RA  Telemetry: Yes  LDAs:   Peripheral IV 12/14/21 Left Antecubital (Active)   Site Assessment Clean; Dry; Intact 12/15/21 1013   Line Status Infusing 12/15/21 1013   Dressing Status Clean; Dry; Intact 12/15/21 1013   Dressing Intervention New 12/14/21 1853     Continuous Infusions:    dextrose 5 % and 0.45 % NaCl 150 mL/hr (12/15/21 6013)    sodium bicarbonate infusion 50 mL/hr at 12/15/21 6062     Mobility: Requires assistance * 1  Cabrera Fall Risk Score: No flowsheet data found.   Fall Interventions: call light in reach  Report given to: Evelyne Zuñiga RN  12/15/21 3796

## 2021-12-16 ENCOUNTER — HOSPITAL ENCOUNTER (INPATIENT)
Age: 26
LOS: 7 days | Discharge: HOME HEALTH | DRG: 469 | End: 2021-12-23
Attending: INTERNAL MEDICINE | Admitting: INTERNAL MEDICINE
Payer: MEDICARE

## 2021-12-16 ENCOUNTER — APPOINTMENT (OUTPATIENT)
Dept: GENERAL RADIOLOGY | Age: 26
DRG: 469 | End: 2021-12-16
Attending: INTERNAL MEDICINE
Payer: MEDICARE

## 2021-12-16 DIAGNOSIS — E10.21 TYPE 1 DIABETES MELLITUS WITH NEPHROPATHY (HCC): Primary | ICD-10-CM

## 2021-12-16 DIAGNOSIS — E10.10 DKA, TYPE 1, NOT AT GOAL (HCC): ICD-10-CM

## 2021-12-16 PROBLEM — E11.10 DKA (DIABETIC KETOACIDOSIS) (HCC): Status: ACTIVE | Noted: 2021-12-16

## 2021-12-16 PROBLEM — E10.9 TYPE 1 DIABETES MELLITUS (HCC): Status: ACTIVE | Noted: 2021-12-16

## 2021-12-16 PROBLEM — N18.9 CKD (CHRONIC KIDNEY DISEASE): Status: ACTIVE | Noted: 2021-12-16

## 2021-12-16 LAB
A/G RATIO: 0.6 (ref 1.1–2.2)
ALBUMIN SERPL-MCNC: 1.9 G/DL (ref 3.4–5)
ALBUMIN SERPL-MCNC: 1.9 G/DL (ref 3.4–5)
ALP BLD-CCNC: 80 U/L (ref 40–129)
ALT SERPL-CCNC: 35 U/L (ref 10–40)
ANION GAP SERPL CALCULATED.3IONS-SCNC: 15 MMOL/L (ref 3–16)
ANION GAP SERPL CALCULATED.3IONS-SCNC: 15 MMOL/L (ref 3–16)
AST SERPL-CCNC: 17 U/L (ref 15–37)
BASOPHILS ABSOLUTE: 0 K/UL (ref 0–0.2)
BASOPHILS RELATIVE PERCENT: 0.4 %
BILIRUB SERPL-MCNC: <0.2 MG/DL (ref 0–1)
BUN BLDV-MCNC: 77 MG/DL (ref 7–20)
BUN BLDV-MCNC: 79 MG/DL (ref 7–20)
C-REACTIVE PROTEIN: 13.2 MG/L (ref 0–5.1)
C3 COMPLEMENT: 98.1 MG/DL (ref 90–180)
C4 COMPLEMENT: 31.7 MG/DL (ref 10–40)
CALCIUM SERPL-MCNC: 8 MG/DL (ref 8.3–10.6)
CALCIUM SERPL-MCNC: 8 MG/DL (ref 8.3–10.6)
CHLORIDE BLD-SCNC: 101 MMOL/L (ref 99–110)
CHLORIDE BLD-SCNC: 104 MMOL/L (ref 99–110)
CHLORIDE URINE RANDOM: 28 MMOL/L
CO2: 14 MMOL/L (ref 21–32)
CO2: 17 MMOL/L (ref 21–32)
CREAT SERPL-MCNC: 4.8 MG/DL (ref 0.9–1.3)
CREAT SERPL-MCNC: 4.8 MG/DL (ref 0.9–1.3)
EOSINOPHILS ABSOLUTE: 0.1 K/UL (ref 0–0.6)
EOSINOPHILS RELATIVE PERCENT: 0.9 %
GFR AFRICAN AMERICAN: 18
GFR AFRICAN AMERICAN: 18
GFR NON-AFRICAN AMERICAN: 15
GFR NON-AFRICAN AMERICAN: 15
GLUCOSE BLD-MCNC: 127 MG/DL (ref 70–99)
GLUCOSE BLD-MCNC: 130 MG/DL (ref 70–99)
GLUCOSE BLD-MCNC: 166 MG/DL (ref 70–99)
GLUCOSE BLD-MCNC: 200 MG/DL (ref 70–99)
GLUCOSE BLD-MCNC: 208 MG/DL (ref 70–99)
GLUCOSE BLD-MCNC: 305 MG/DL (ref 70–99)
GLUCOSE BLD-MCNC: 327 MG/DL (ref 70–99)
GLUCOSE BLD-MCNC: 70 MG/DL (ref 70–99)
GLUCOSE BLD-MCNC: 85 MG/DL (ref 70–99)
HCT VFR BLD CALC: 27 % (ref 40.5–52.5)
HEMOGLOBIN: 8.7 G/DL (ref 13.5–17.5)
LACTIC ACID: 0.9 MMOL/L (ref 0.4–2)
LYMPHOCYTES ABSOLUTE: 1 K/UL (ref 1–5.1)
LYMPHOCYTES RELATIVE PERCENT: 11.8 %
MAGNESIUM: 2 MG/DL (ref 1.8–2.4)
MCH RBC QN AUTO: 27.8 PG (ref 26–34)
MCHC RBC AUTO-ENTMCNC: 32.1 G/DL (ref 31–36)
MCV RBC AUTO: 86.5 FL (ref 80–100)
MONOCYTES ABSOLUTE: 1 K/UL (ref 0–1.3)
MONOCYTES RELATIVE PERCENT: 12.6 %
NEUTROPHILS ABSOLUTE: 6.1 K/UL (ref 1.7–7.7)
NEUTROPHILS RELATIVE PERCENT: 74.3 %
OSMOLALITY URINE: 272 MOSM/KG (ref 390–1070)
OSMOLALITY: 326 MOSM/KG (ref 275–295)
PDW BLD-RTO: 15.7 % (ref 12.4–15.4)
PERFORMED ON: ABNORMAL
PERFORMED ON: NORMAL
PERFORMED ON: NORMAL
PHOSPHORUS: 5.9 MG/DL (ref 2.5–4.9)
PLATELET # BLD: 464 K/UL (ref 135–450)
PMV BLD AUTO: 9.3 FL (ref 5–10.5)
POTASSIUM SERPL-SCNC: 3.8 MMOL/L (ref 3.5–5.1)
POTASSIUM SERPL-SCNC: 4.6 MMOL/L (ref 3.5–5.1)
POTASSIUM, UR: 16.5 MMOL/L
PROCALCITONIN: 0.45 NG/ML (ref 0–0.15)
RBC # BLD: 3.12 M/UL (ref 4.2–5.9)
SEDIMENTATION RATE, ERYTHROCYTE: 106 MM/HR (ref 0–15)
SODIUM BLD-SCNC: 133 MMOL/L (ref 136–145)
SODIUM BLD-SCNC: 133 MMOL/L (ref 136–145)
SODIUM URINE: 35 MMOL/L
TOTAL PROTEIN: 5 G/DL (ref 6.4–8.2)
WBC # BLD: 8.3 K/UL (ref 4–11)

## 2021-12-16 PROCEDURE — 6360000002 HC RX W HCPCS: Performed by: INTERNAL MEDICINE

## 2021-12-16 PROCEDURE — 80053 COMPREHEN METABOLIC PANEL: CPT

## 2021-12-16 PROCEDURE — 94761 N-INVAS EAR/PLS OXIMETRY MLT: CPT

## 2021-12-16 PROCEDURE — 84300 ASSAY OF URINE SODIUM: CPT

## 2021-12-16 PROCEDURE — 6360000002 HC RX W HCPCS: Performed by: STUDENT IN AN ORGANIZED HEALTH CARE EDUCATION/TRAINING PROGRAM

## 2021-12-16 PROCEDURE — 84133 ASSAY OF URINE POTASSIUM: CPT

## 2021-12-16 PROCEDURE — 85652 RBC SED RATE AUTOMATED: CPT

## 2021-12-16 PROCEDURE — 96366 THER/PROPH/DIAG IV INF ADDON: CPT

## 2021-12-16 PROCEDURE — 83930 ASSAY OF BLOOD OSMOLALITY: CPT

## 2021-12-16 PROCEDURE — 82436 ASSAY OF URINE CHLORIDE: CPT

## 2021-12-16 PROCEDURE — 84155 ASSAY OF PROTEIN SERUM: CPT

## 2021-12-16 PROCEDURE — 83516 IMMUNOASSAY NONANTIBODY: CPT

## 2021-12-16 PROCEDURE — 99255 IP/OBS CONSLTJ NEW/EST HI 80: CPT | Performed by: INTERNAL MEDICINE

## 2021-12-16 PROCEDURE — 2580000003 HC RX 258: Performed by: INTERNAL MEDICINE

## 2021-12-16 PROCEDURE — 86038 ANTINUCLEAR ANTIBODIES: CPT

## 2021-12-16 PROCEDURE — 86160 COMPLEMENT ANTIGEN: CPT

## 2021-12-16 PROCEDURE — 85025 COMPLETE CBC W/AUTO DIFF WBC: CPT

## 2021-12-16 PROCEDURE — 2500000003 HC RX 250 WO HCPCS: Performed by: STUDENT IN AN ORGANIZED HEALTH CARE EDUCATION/TRAINING PROGRAM

## 2021-12-16 PROCEDURE — 83036 HEMOGLOBIN GLYCOSYLATED A1C: CPT

## 2021-12-16 PROCEDURE — 71045 X-RAY EXAM CHEST 1 VIEW: CPT

## 2021-12-16 PROCEDURE — 84165 PROTEIN E-PHORESIS SERUM: CPT

## 2021-12-16 PROCEDURE — 83935 ASSAY OF URINE OSMOLALITY: CPT

## 2021-12-16 PROCEDURE — 83883 ASSAY NEPHELOMETRY NOT SPEC: CPT

## 2021-12-16 PROCEDURE — 87040 BLOOD CULTURE FOR BACTERIA: CPT

## 2021-12-16 PROCEDURE — 6370000000 HC RX 637 (ALT 250 FOR IP): Performed by: STUDENT IN AN ORGANIZED HEALTH CARE EDUCATION/TRAINING PROGRAM

## 2021-12-16 PROCEDURE — G0378 HOSPITAL OBSERVATION PER HR: HCPCS

## 2021-12-16 PROCEDURE — 86140 C-REACTIVE PROTEIN: CPT

## 2021-12-16 PROCEDURE — 84145 PROCALCITONIN (PCT): CPT

## 2021-12-16 PROCEDURE — 99254 IP/OBS CNSLTJ NEW/EST MOD 60: CPT | Performed by: INTERNAL MEDICINE

## 2021-12-16 PROCEDURE — 2580000003 HC RX 258: Performed by: STUDENT IN AN ORGANIZED HEALTH CARE EDUCATION/TRAINING PROGRAM

## 2021-12-16 PROCEDURE — 2060000000 HC ICU INTERMEDIATE R&B

## 2021-12-16 PROCEDURE — 83605 ASSAY OF LACTIC ACID: CPT

## 2021-12-16 PROCEDURE — 83735 ASSAY OF MAGNESIUM: CPT

## 2021-12-16 PROCEDURE — 36415 COLL VENOUS BLD VENIPUNCTURE: CPT

## 2021-12-16 RX ORDER — SODIUM CHLORIDE 9 MG/ML
25 INJECTION, SOLUTION INTRAVENOUS PRN
Status: DISCONTINUED | OUTPATIENT
Start: 2021-12-16 | End: 2021-12-16 | Stop reason: SDUPTHER

## 2021-12-16 RX ORDER — DEXTROSE MONOHYDRATE 50 MG/ML
100 INJECTION, SOLUTION INTRAVENOUS PRN
Status: DISCONTINUED | OUTPATIENT
Start: 2021-12-16 | End: 2021-12-23 | Stop reason: HOSPADM

## 2021-12-16 RX ORDER — HEPARIN SODIUM 5000 [USP'U]/ML
5000 INJECTION, SOLUTION INTRAVENOUS; SUBCUTANEOUS EVERY 8 HOURS SCHEDULED
Status: DISCONTINUED | OUTPATIENT
Start: 2021-12-16 | End: 2021-12-23 | Stop reason: HOSPADM

## 2021-12-16 RX ORDER — HYDRALAZINE HYDROCHLORIDE 20 MG/ML
5 INJECTION INTRAMUSCULAR; INTRAVENOUS EVERY 6 HOURS PRN
Status: DISCONTINUED | OUTPATIENT
Start: 2021-12-16 | End: 2021-12-20

## 2021-12-16 RX ORDER — FLUOXETINE HYDROCHLORIDE 20 MG/1
20 CAPSULE ORAL DAILY
Status: DISCONTINUED | OUTPATIENT
Start: 2021-12-16 | End: 2021-12-23 | Stop reason: HOSPADM

## 2021-12-16 RX ORDER — QUETIAPINE FUMARATE 25 MG/1
25 TABLET, FILM COATED ORAL NIGHTLY
Status: DISCONTINUED | OUTPATIENT
Start: 2021-12-16 | End: 2021-12-23 | Stop reason: HOSPADM

## 2021-12-16 RX ORDER — SODIUM CHLORIDE 9 MG/ML
25 INJECTION, SOLUTION INTRAVENOUS PRN
Status: DISCONTINUED | OUTPATIENT
Start: 2021-12-16 | End: 2021-12-23 | Stop reason: HOSPADM

## 2021-12-16 RX ORDER — ACETAMINOPHEN 325 MG/1
650 TABLET ORAL EVERY 6 HOURS PRN
Status: DISCONTINUED | OUTPATIENT
Start: 2021-12-16 | End: 2021-12-23 | Stop reason: HOSPADM

## 2021-12-16 RX ORDER — DEXTROSE MONOHYDRATE 25 G/50ML
12.5 INJECTION, SOLUTION INTRAVENOUS PRN
Status: DISCONTINUED | OUTPATIENT
Start: 2021-12-16 | End: 2021-12-23 | Stop reason: HOSPADM

## 2021-12-16 RX ORDER — NICOTINE POLACRILEX 4 MG
15 LOZENGE BUCCAL PRN
Status: DISCONTINUED | OUTPATIENT
Start: 2021-12-16 | End: 2021-12-23 | Stop reason: HOSPADM

## 2021-12-16 RX ORDER — SODIUM CHLORIDE 0.9 % (FLUSH) 0.9 %
5-40 SYRINGE (ML) INJECTION EVERY 12 HOURS SCHEDULED
Status: DISCONTINUED | OUTPATIENT
Start: 2021-12-16 | End: 2021-12-16 | Stop reason: SDUPTHER

## 2021-12-16 RX ORDER — INSULIN GLARGINE 100 [IU]/ML
20 INJECTION, SOLUTION SUBCUTANEOUS DAILY
Status: DISCONTINUED | OUTPATIENT
Start: 2021-12-17 | End: 2021-12-23 | Stop reason: HOSPADM

## 2021-12-16 RX ORDER — INSULIN GLARGINE 100 [IU]/ML
40 INJECTION, SOLUTION SUBCUTANEOUS DAILY
Status: DISCONTINUED | OUTPATIENT
Start: 2021-12-16 | End: 2021-12-16

## 2021-12-16 RX ORDER — ACETAMINOPHEN 650 MG/1
650 SUPPOSITORY RECTAL EVERY 6 HOURS PRN
Status: DISCONTINUED | OUTPATIENT
Start: 2021-12-16 | End: 2021-12-23 | Stop reason: HOSPADM

## 2021-12-16 RX ORDER — SODIUM CHLORIDE 0.9 % (FLUSH) 0.9 %
5-40 SYRINGE (ML) INJECTION EVERY 12 HOURS SCHEDULED
Status: DISCONTINUED | OUTPATIENT
Start: 2021-12-16 | End: 2021-12-23 | Stop reason: HOSPADM

## 2021-12-16 RX ORDER — SODIUM CHLORIDE 0.9 % (FLUSH) 0.9 %
5-40 SYRINGE (ML) INJECTION PRN
Status: DISCONTINUED | OUTPATIENT
Start: 2021-12-16 | End: 2021-12-16 | Stop reason: SDUPTHER

## 2021-12-16 RX ORDER — FUROSEMIDE 10 MG/ML
40 INJECTION INTRAMUSCULAR; INTRAVENOUS ONCE
Status: COMPLETED | OUTPATIENT
Start: 2021-12-16 | End: 2021-12-16

## 2021-12-16 RX ORDER — SODIUM CHLORIDE 0.9 % (FLUSH) 0.9 %
5-40 SYRINGE (ML) INJECTION PRN
Status: DISCONTINUED | OUTPATIENT
Start: 2021-12-16 | End: 2021-12-23 | Stop reason: HOSPADM

## 2021-12-16 RX ORDER — PREGABALIN 100 MG/1
100 CAPSULE ORAL DAILY
Status: DISCONTINUED | OUTPATIENT
Start: 2021-12-16 | End: 2021-12-17

## 2021-12-16 RX ORDER — SODIUM CHLORIDE 9 MG/ML
INJECTION, SOLUTION INTRAVENOUS CONTINUOUS
Status: DISCONTINUED | OUTPATIENT
Start: 2021-12-16 | End: 2021-12-16

## 2021-12-16 RX ADMIN — HEPARIN SODIUM 5000 UNITS: 5000 INJECTION INTRAVENOUS; SUBCUTANEOUS at 14:04

## 2021-12-16 RX ADMIN — NAFCILLIN SODIUM 2000 MG: 2 INJECTION, POWDER, LYOPHILIZED, FOR SOLUTION INTRAMUSCULAR; INTRAVENOUS at 04:08

## 2021-12-16 RX ADMIN — SODIUM CHLORIDE, PRESERVATIVE FREE 10 ML: 5 INJECTION INTRAVENOUS at 21:49

## 2021-12-16 RX ADMIN — SODIUM BICARBONATE: 84 INJECTION, SOLUTION INTRAVENOUS at 08:13

## 2021-12-16 RX ADMIN — INSULIN HUMAN 5 UNITS: 100 INJECTION, SOLUTION PARENTERAL at 04:09

## 2021-12-16 RX ADMIN — SODIUM CHLORIDE, PRESERVATIVE FREE 10 ML: 5 INJECTION INTRAVENOUS at 09:00

## 2021-12-16 RX ADMIN — HEPARIN SODIUM 5000 UNITS: 5000 INJECTION INTRAVENOUS; SUBCUTANEOUS at 06:27

## 2021-12-16 RX ADMIN — HEPARIN SODIUM 5000 UNITS: 5000 INJECTION INTRAVENOUS; SUBCUTANEOUS at 21:49

## 2021-12-16 RX ADMIN — FLUOXETINE 20 MG: 20 CAPSULE ORAL at 09:04

## 2021-12-16 RX ADMIN — INSULIN GLARGINE 40 UNITS: 100 INJECTION, SOLUTION SUBCUTANEOUS at 04:09

## 2021-12-16 RX ADMIN — FUROSEMIDE 40 MG: 10 INJECTION, SOLUTION INTRAMUSCULAR; INTRAVENOUS at 18:28

## 2021-12-16 RX ADMIN — INSULIN LISPRO 2 UNITS: 100 INJECTION, SOLUTION INTRAVENOUS; SUBCUTANEOUS at 15:30

## 2021-12-16 RX ADMIN — INSULIN LISPRO 1 UNITS: 100 INJECTION, SOLUTION INTRAVENOUS; SUBCUTANEOUS at 23:17

## 2021-12-16 RX ADMIN — PREGABALIN 100 MG: 100 CAPSULE ORAL at 09:04

## 2021-12-16 RX ADMIN — SODIUM BICARBONATE: 84 INJECTION, SOLUTION INTRAVENOUS at 04:03

## 2021-12-16 RX ADMIN — CEFAZOLIN SODIUM 1000 MG: 1 INJECTION, POWDER, FOR SOLUTION INTRAMUSCULAR; INTRAVENOUS at 12:14

## 2021-12-16 RX ADMIN — QUETIAPINE FUMARATE 25 MG: 25 TABLET ORAL at 21:49

## 2021-12-16 RX ADMIN — NAFCILLIN SODIUM 2000 MG: 2 INJECTION, POWDER, LYOPHILIZED, FOR SOLUTION INTRAMUSCULAR; INTRAVENOUS at 08:15

## 2021-12-16 ASSESSMENT — PAIN SCALES - GENERAL
PAINLEVEL_OUTOF10: 0
PAINLEVEL_OUTOF10: 5
PAINLEVEL_OUTOF10: 10
PAINLEVEL_OUTOF10: 0

## 2021-12-16 ASSESSMENT — PAIN DESCRIPTION - ONSET: ONSET: UNABLE TO TELL

## 2021-12-16 ASSESSMENT — PAIN DESCRIPTION - FREQUENCY: FREQUENCY: CONTINUOUS

## 2021-12-16 ASSESSMENT — PAIN DESCRIPTION - LOCATION: LOCATION: FOOT

## 2021-12-16 ASSESSMENT — PAIN - FUNCTIONAL ASSESSMENT: PAIN_FUNCTIONAL_ASSESSMENT: ACTIVITIES ARE NOT PREVENTED

## 2021-12-16 ASSESSMENT — PAIN DESCRIPTION - PROGRESSION
CLINICAL_PROGRESSION: NOT CHANGED

## 2021-12-16 ASSESSMENT — PAIN DESCRIPTION - PAIN TYPE: TYPE: NEUROPATHIC PAIN

## 2021-12-16 ASSESSMENT — PAIN DESCRIPTION - ORIENTATION: ORIENTATION: RIGHT;LEFT

## 2021-12-16 NOTE — FLOWSHEET NOTE
12/16/21 0026   Transfer Checklist   Reason for Transfer Physician Request   Receiving Service Cardiology; Urology   [facility]   Transfer Destination  Inside   Bed Assignment 22 [id number]   Accepting Physician Esperanza Phalen   Sending Physician [address]   Nurse to Nurse Report Occured? Yes   Spoke with Ghada Love Phone Number [phone]   EMTALA Transfer Form Signed by Patient Verbal Consent   Copy of Records and DI Obtained Yes   Transmitted Records to Receiving Facility Yes   Risks/Benefits/Alternatives to Transport Explained Yes   Extended Transfer Checklist Warm Blankets Applied; Airway Patent and Secured   Belongings   Dental Appliances None   Vision - Corrective Lenses None   Hearing Aid None   Jewelry None   Body Piercings Removed N   Were All Patient Medications Collected?  No   Other Valuables None   Valuables Given To Patient   Provide Name(s) of Who Valuable(s) Were Given To Ervin Deshpande

## 2021-12-16 NOTE — PROGRESS NOTES
Progress Note  Admit Date: 12/16/2021       CC: No chief complaint on file. Overnight Events: No acute overnight events. Pt seen and examined at bedside. Feeling fatigued, otherwise no dyspnea, chest pain, palpitations, N/V. Interval History: Refer to H&P    Review of Systems  General appearance: alert, appears stated age and cooperative. +fatigue  Skin: Skin color, texture, normal. No rashes or lesions  HEENT: No nose bleed, headache, vision problems  CV: No chest pain, tightness, pressure,   Respiratory: No c/o SOB, ALANIZ, Orthopnea, PND  GI: No abdominal pain, black stool, bloating  Limbs: No c/o edema, pain, intermittent claudication, joint pains. +LE edema  Neuro: No dizziness, lightheadedness, syncope, gait problems, memory problems  Psych: grossly normal. No SI/depression.     Scheduled Medications:    insulin lispro  0-6 Units SubCUTAneous Q4H    heparin (porcine)  5,000 Units SubCUTAneous 3 times per day    sodium chloride flush  5-40 mL IntraVENous 2 times per day    insulin glargine  40 Units SubCUTAneous Daily    pregabalin  100 mg Oral Daily    QUEtiapine  25 mg Oral Nightly    FLUoxetine  20 mg Oral Daily    nafcillin  2,000 mg IntraVENous Q4H      PRN Medications: acetaminophen **OR** acetaminophen, sodium chloride, sodium chloride flush, glucose, dextrose, glucagon (rDNA), dextrose, hydrALAZINE  Diet: Diet NPO    Continuous Infusions:   sodium chloride      dextrose      IV infusion builder 100 mL/hr at 12/16/21 0859       PHYSICAL EXAM:  /87   Pulse 79   Temp 97.8 °F (36.6 °C) (Oral)   Resp 16   Wt 140 lb 3.4 oz (63.6 kg)   SpO2 98%   BMI 24.84 kg/m²   Recent Labs     12/15/21  2042 12/15/21  2340 12/16/21  0238 12/16/21  0541 12/16/21  0802   POCGLU 541* 430* 305* 208* 130*       Intake/Output Summary (Last 24 hours) at 12/16/2021 0958  Last data filed at 12/16/2021 0859  Gross per 24 hour   Intake 663.1 ml   Output 725 ml   Net -61.9 ml       General appearance: No apparent distress, appears stated age and cooperative. HEENT: Pupils equal, round, and reactive to light. Conjunctivae/corneas clear. Neck: Supple, with full range of motion. No jugular venous distention. Trachea midline. Respiratory:  Normal respiratory effort. Clear to auscultation, bilaterally without Rales/Wheezes/Rhonchi. Cardiovascular: Regular rate and rhythm with normal S1/S2 without murmurs, rubs or gallops. Abdomen: Soft, non-tender, non-distended with normal bowel sounds. Musculoskeletal: No clubbing, cyanosis or edema bilaterally. Full range of motion without deformity. Skin: Skin color, texture, turgor normal.  No rashes or lesions. Neurologic:  Neurovascularly intact without any focal sensory/motor deficits. Cranial nerves: II-XII intact, grossly non-focal.  Psychiatric: Alert and oriented, thought content appropriate, normal insight    LABS:  Recent Labs     12/14/21  1914 12/15/21  0420 12/16/21  0253   WBC 8.1 9.1 8.3   HGB 8.7* 9.3* 8.7*   HCT 27.2* 28.6* 27.0*   * 502* 464*                                                                    Recent Labs     12/15/21  0722 12/15/21  1414 12/16/21  0253    135 133*   K 4.4 5.5* 4.6    104 104   CO2 14* 14* 14*   BUN 75* 75* 79*   CREATININE 4.6* 4.6* 4.8*   GLUCOSE 79 308* 327*     Recent Labs     12/14/21 1914 12/16/21  0253   AST 28 17   ALT 57 35   BILITOT <0.2* <0.2   ALKPHOS 98 80     No results for input(s): TROPONINI in the last 72 hours. No results for input(s): BNP in the last 72 hours. No results for input(s): CHOL, HDL in the last 72 hours. Invalid input(s): LDLCALCU  No results for input(s): INR in the last 72 hours.     Assessment & Plan:      MSSA endocarditis of aortic valve  - Cardiology consulted for possible DIANA  - ID consulted for abx coverage  - Vancomycin was likely culprit for MONICA    MONICA on CKD4  - Cr 4.8 from baseline of around 3.0-3.5  - Nephrology consulted  - Had 500cc UOP from straight cath    Nonanion gap metabolic acidosis   - Bicarb at 14  - Continue Bicarb gtt for now  - Nephro to eval need for HD    NSTEMI, likely type II  - Cardiology consulted  - No ischemic changes on EKG, probably not ACS    DM1  - SSI  - Hypoglycemia protocol   - Changed Lantus 40U qd to 20U qd as pt is currently NPO    Hyperkalemia, resolved    The patient and / or the family were informed of the results of any tests, a time was given to answer questions, a plan was proposed and they agreed with plan.     DVT Prophylaxis: Heparin  Diet: Diet NPO  Code Status: Full Code    Disposition: Inpatient pending clinical improvement      Antwon Abel MD   Internal Medicine  12/16/2021 9:58 AM  Reach via [employer]

## 2021-12-16 NOTE — CONSULTS
Types: Cigarettes    Smokeless tobacco: Never Used   Vaping Use    Vaping Use: Every day   Substance Use Topics    Alcohol use: Never    Drug use: Yes     Types: Marijuana Manuel Blow)     Social History     Tobacco Use   Smoking Status Current Some Day Smoker    Types: Cigarettes   Smokeless Tobacco Never Used      Family History :UNCLE and Brother has DM+       REVIEW OF SYSTEMS:      Constitutional:  negative for fevers, chills, night sweats  Eyes:  negative for blurred vision, eye discharge, visual disturbance   HEENT:  negative for hearing loss, ear drainage,nasal congestion  Respiratory:  negative for cough, shortness of breath ++  or hemoptysis   Cardiovascular:  negative for chest pain, palpitations, syncope  Gastrointestinal:  negative for nausea, vomiting, diarrhea, constipation, abdominal pain +   Genitourinary:  negative for frequency, dysuria, urinary incontinence, hematuria  Hematologic/Lymphatic:  negative for easy bruising, bleeding and lymphadenopathy  Allergic/Immunologic:  negative for recurrent infections, angioedema, anaphylaxis   Endocrine:  negative for weight changes, polyuria, polydipsia and polyphagia  Musculoskeletal:  negative for joint  pain, swelling, decreased range of motion  Integumentary: No rashes, skin lesions  Neurological:  negative for headaches, slurred speech, unilateral weakness  Psychiatric: negative for hallucinations,confusion,agitation.      PHYSICAL EXAM:      Vitals:    /87   Pulse 79   Temp 97.8 °F (36.6 °C) (Oral)   Resp 16   Wt 140 lb 3.4 oz (63.6 kg)   SpO2 98%   BMI 24.84 kg/m²     General Appearance: alert,in some acute distress,++ pallor, no  icterus SHORT STATURE : Chronic ill appearing man+ Ofelia orbital edema++  Skin: warm and dry, no rash or erythema  Head: normocephalic and atraumatic  Eyes: pupils equal, round, and reactive to light, conjunctivae normal  ENT: tympanic membrane, external ear and ear canal normal bilaterally, nose without deformity, nasal mucosa and turbinates normal without polyps  Neck: supple and non-tender without mass, no thyromegaly  no cervical lymphadenopathy  Pulmonary/Chest: Bi basal crepts++  no wheezes, rales or rhonchi, normal air movement, no respiratory distress  Cardiovascular: l S1 and S2, ESM+  murmurs, rubs, clicks, or gallops, no carotid bruits  Abdomen: soft, non-tender, non-distended, normal bowel sounds, no masses or organomegaly  Extremities: no cyanosis, clubbing or ++ edema  Musculoskeletal: normal range of motion, no joint swelling, deformity or tenderness  Integumentary: No rashes, no abnormal skin lesions, no petechiae  Neurologic: reflexes normal and symmetric, no cranial nerve deficit  Psych:  Orientation, sensorium, mood normal   Lines: iv  DATA:    CBC:   Lab Results   Component Value Date    WBC 8.3 12/16/2021    HGB 8.7 (L) 12/16/2021    HCT 27.0 (L) 12/16/2021    MCV 86.5 12/16/2021     (H) 12/16/2021     RENAL:   Lab Results   Component Value Date    CREATININE 4.8 (H) 12/16/2021    BUN 79 (H) 12/16/2021     (L) 12/16/2021    K 4.6 12/16/2021     12/16/2021    CO2 14 (LL) 12/16/2021     SED RATE: No results found for: SEDRATE  CK: No results found for: CKTOTAL  CRP: No results found for: CRP  Hepatic Function Panel:   Lab Results   Component Value Date    ALKPHOS 80 12/16/2021    ALT 35 12/16/2021    AST 17 12/16/2021    PROT 5.0 12/16/2021    BILITOT <0.2 12/16/2021    BILIDIR <0.2 12/14/2021    LABALBU 1.9 12/16/2021     UA:  Lab Results   Component Value Date    COLORU YELLOW 12/15/2021    GLUCOSEU >= 1000 12/15/2021    BILIRUBINUR NEGATIVE 12/15/2021    KETUA NEGATIVE 12/15/2021    BLOODU SMALL 12/15/2021    PHUR 6.5 12/15/2021    PROTEINU 300 12/15/2021    UROBILINOGEN 0.2 12/15/2021    NITRU NEGATIVE 12/15/2021    LEUKOCYTESUR NEGATIVE 12/15/2021      Urine Microscopic:   Lab Results   Component Value Date    BACTERIA NONE SEEN 12/15/2021    WBCUA 5-9 12/15/2021    RBCUA 0-2 12/15/2021    EPIU 0-2 12/15/2021     Urine Reflex to Culture: No results found for: URRFLXCULT    Creat   4.5    MICRO: cultures reviewed and updated by me       Blood Culture:   Lab Results   Component Value Date    BC No growth-preliminary  12/15/2021     Specimen:  Blood - Blood specimen (specimen)  Component 8 d ago Comments   BLOOD CULTURE  SEE BELOW~should be CONSIDERED a SYNERGYSTIC agent ONLY. Source: blood-Adult-suboptimal <5.5oz./set volume   Site: Peripheral Vein   Collected: 12/08/21   14:00   Current Antibiotics: not stated   Antibiotics comment:     --------------------------------------------------- C O M M E N T S   ----------------------------------------------------------------   .                           STATUS OF ORDERED AND REPORTED TESTS   BLOOD CULTURE                          FINAL           12/10/21       BLOOD CULTURE                                   FINAL         12/10/21 19:59       Organism  01  Staphylococcus aureus                  In the treatment of gram positive infections, GENTAMICIN                  should be CONSIDERED a SYNERGYSTIC agent ONLY.                Ciprofloxacin and Levofloxacin, regardless of in vitro                  sensitivity, should not be used for staphylococcal infections                  other than uncomplicated lower UTIs.                Moxifloxacin, regardless of in vitro sensitivity, should                  not be used for staphylococcal infections.      _____________________________________________________________________________   Organism        01-staaur     Antibiotic      PATSY  Intrp  *Adult dosage             Pk bld level Pk urine   lev.   _____________________________________________________________________________   Gentamicin     <=0.5   S   IV 1.7mg/kg q 8 h          5-7          >=100           Oxacillin       0.5    S   IV 500mg                   43                           Vancomycin       1     S   IV 1000mg q 12 h           20-50        800             Trimethoprim/Ruggiero <=10   S   [id number]/800mgSMX q 12h   1-2TMP/72-74T87-32MQX/97SM                              IV 160mgTMP/800mgSMX  q 8 h9TMP/105 SMX                 _____________________________________________________________________________   S=Susceptible I=Intermediate R=Resistant Susceptibility is determined by   comparing the PATSY of organism to the achievable   blood or urine level of drug. Level at the site of infection should be a   minimum of 5-10 times the PATSY.   _____________________________________________________________________________   PATSY and blood and urine levels=mcg/ml. *Standard dosages from [facility] to   Antimicrobial Therapy and assumes moderate   infection in normal adult populations. For pts. with renal or liver disease   consult PDR or pharmacist.   _____________________________________________________________________________   [facility]    Specimen Collected: 12/08/21  2:00 PM Last Resulted: 12/10/21  8:00 PM       Viral Culture:    No results found for: COVID19  Urine Culture: No results for input(s): Vivi Garcia in the last 72 hours.     Scheduled Meds:   insulin lispro  0-6 Units SubCUTAneous Q4H    heparin (porcine)  5,000 Units SubCUTAneous 3 times per day    sodium chloride flush  5-40 mL IntraVENous 2 times per day    pregabalin  100 mg Oral Daily    QUEtiapine  25 mg Oral Nightly    FLUoxetine  20 mg Oral Daily    nafcillin  2,000 mg IntraVENous Q4H    [START ON 12/17/2021] insulin glargine  20 Units SubCUTAneous Daily       Continuous Infusions:   sodium chloride      dextrose      IV infusion builder 100 mL/hr at 12/16/21 0859       PRN Meds:  acetaminophen **OR** acetaminophen, sodium chloride, sodium chloride flush, glucose, dextrose, glucagon (rDNA), dextrose, hydrALAZINE    Imaging:   XR CHEST PORTABLE   Final Result   Right-sided pleural effusion with adjacent right basilar consolidation,   either atelectasis or pneumonia. Anatomical Region Laterality Modality   Head -- Magnetic Resonance         Conclusion:    Technically satisfactory Echocardiography    Normal Left Ventricular size and function, diastolic function   is normal    Normal Aortic Root    No intracardiac masses or thrombi    Mild concentric left ventricular hypertrophy noted.  Normal Right ventricle Size and function.  Left ventricular ejection fraction est 65%    Normal Mitral Valve     Aortic valve. with small echogenic structure on the ventricular   surface, suspicious for vegetation    Normal Tricuspid valve.  Trace mitral regurgitation    Trace to mild tricuspid regurgitation    Normal color flow across Aortic and pulmonic valve    Normal left Ventricular Diastolic function.  Trivial circumferential pericardial effusion noted without   tamponade physiology    Estimated right ventricular systolic pressure 40 mmHg    Normal LA Size     Clinical correlation needed with respect to the aortic valve. Impression  Performed by RADIOLOGY  IMPRESSION:     Normal MRI of the brain without contrast.     Electronically signed by: Jordan Powers MD  12/9/2021 4:03 PM CST Workstation: [id number]    Narrative  Performed by RADIOLOGY    EXAM: MRI OF THE BRAIN WITHOUT CONTRAST     HISTORY: No Previous, History-neuro deficit, stroke suspected, altered mental status, concern for abscess in a drug user who has staph bacteremia;       COMPARISON: CT dated 12/8/2021     TECHNIQUE : Multiplanar, multisequence imaging of the brain was obtained without contrast.     FINDINGS:      All pertinent images and reports for the current Hospitalization were reviewed by me.     IMPRESSION:    Patient Active Problem List   Diagnosis    DKA, type 1, not at goal [facility])    Acute infective endocarditis    MONICA [facility]) [facility] [address]    Metabolic acidosis    Peripheral edema    CKD (chronic kidney disease)  [facility]diabetic ketoacidosis) [facility] [address]    Type 1 diabetes mellitus ([facility] [address]     Tx from out side facility   Recent admit for DKA  Resp failure was intubated and now extubated  DM poor control   Staph aureus Bacteremia on 12/8/21 -MSSA  TTE out side facility with small echogenicity on the Aortic valve  Aortic valve Endocarditis suspected  MONICA on CKD  USG Kidney no obstruction    Anasarca from Renal disease  Poor social situation   Anemia likely from CKD     Unfortunately worsening renal function in the setting of Endocarditis and Bacteremia , and he is volume over load - Nafcillin less ideal currently given the gross volume over load and salt excess and vegetation on TTE is small we can choose IV Cefazolin to treat the bacteremia and Endocarditis          Labs, Microbiology, Radiology and pertinent results from current hospitalization and care every where were reviewed by me as a part of the consultation. PLAN :'  1. D/C Nafcillin   2. IV Cefazolin x 1 gm  q 24 HRS  3. Dm CONTROL  4. WILL need to complete abx course and check repeat TTE  5. Check repeat Blood cx  6. ESR, CRP  7. May need placement to complete the course unless he end up on HD then we can coordinate IV abx with Dialysis      Discussed with patient/Family and Nursing   Risk of Complications/Morbidity: High      · Illness(es)/ Infection present that pose threat to bodily function. · There is potential for severe exacerbation of infection/side effects of treatment. Therapy requires intensive monitoring for antimicrobial agent toxicity. Thanks for allowing me to participate in your patient's care please call me with any questions or concerns.     Dr. Lucille Holbrook MD  [address] [place] [address] Physician  Phone: [phone]   Fax : [phone]

## 2021-12-16 NOTE — ED NOTES
Pt left by TRISH on monitor, with belongings to [facility] [address].  A+Ox3     Pablo Bertrand RN  12/16/21 6247

## 2021-12-16 NOTE — CONSULTS
palpitations, shortness of breath, PND, and orthopnea. He has never had endocarditis before. He denies IVDA. Review of Systems:  Constitutional: Some fatigue and weakness. No night sweats or fever. HEENT: No new vision difficulties or ringing in the ears. Respiratory: No new SOB, PND, orthopnea or cough. Cardiovascular: See HPI   GI: No n/v, diarrhea, constipation, abdominal pain or changes in bowel habits. No melena, no hematochezia  : No urinary frequency, urgency, incontinence, hematuria or dysuria. Skin: No cyanosis or skin lesions. Musculoskeletal: Positive for lower extremity edema. No new muscle or joint pain. Neurological: No syncope or TIA-like symptoms. Psychiatric: No anxiety, insomnia or depression     Past Medical History:   Diagnosis Date    Diabetes ([facility] [address]     Foot drop, left foot     Kidney failure     STAGE 4    Neuropathy      No past surgical history on file. No family history on file.   Social History     Tobacco Use    Smoking status: Current Some Day Smoker     Types: Cigarettes    Smokeless tobacco: Never Used   Vaping Use    Vaping Use: Every day   Substance Use Topics    Alcohol use: Never    Drug use: Yes     Types: Marijuana (Weed)       No Known Allergies  Current Facility-Administered Medications   Medication Dose Route Frequency Provider Last Rate Last Admin    insulin lispro (HUMALOG) injection vial 0-6 Units  0-6 Units SubCUTAneous Q4H Ted Tamez DO        acetaminophen (TYLENOL) tablet 650 mg  650 mg Oral Q6H PRN Ted Tamez DO        Or    acetaminophen (TYLENOL) suppository 650 mg  650 mg Rectal Q6H PRN Ted Tamez DO        heparin (porcine) injection 5,000 Units  5,000 Units SubCUTAneous 3 times per day Ted Tamez DO   5,000 Units at 12/16/21 0627    0.9 % sodium chloride infusion  25 mL IntraVENous PRN Kaitlynn Enamorado MD        sodium chloride flush 0.9 % injection 5-40 mL  5-40 mL IntraVENous 2 times per day William MD Angelina   10 mL at 12/16/21 0900    sodium chloride flush 0.9 % injection 5-40 mL  5-40 mL IntraVENous PRN Billy Newell MD        glucose (GLUTOSE) 40 % oral gel 15 g  15 g Oral PRN Ted  Dashawn, DO        dextrose 50 % IV solution  12.5 g IntraVENous PRN [facility]        glucagon (rDNA) injection 1 mg  1 mg IntraMUSCular PRN [facility]        dextrose 5 % solution  100 mL/hr IntraVENous PRN [facility]        hydrALAZINE (APRESOLINE) injection 5 mg  5 mg IntraVENous Q6H PRN Ted [facility]        pregabalin (LYRICA) capsule 100 mg  100 mg Oral Daily [facility]   100 mg at 12/16/21 0904    QUEtiapine (SEROQUEL) tablet 25 mg  25 mg Oral Nightly [facility]        FLUoxetine (PROZAC) capsule 20 mg  20 mg Oral Daily [facility]   20 mg at 12/16/21 0904    nafcillin 2,000 mg in dextrose 5 % 100 mL IVPB  2,000 mg IntraVENous Q4H [facility]   Stopped at 12/16/21 0929    sodium bicarbonate 75 mEq in sodium chloride 0.45 % 1,000 mL infusion   IntraVENous Continuous Ted [facility],  mL/hr at 12/16/21 0859 Rate Verify at 12/16/21 0859    [START ON 12/17/2021] insulin glargine (LANTUS) injection vial 20 Units  20 Units SubCUTAneous Daily Hope Guajardo MD           Physical Exam:   /87   Pulse 79   Temp 97.8 °F (36.6 °C) (Oral)   Resp 16   Wt 140 lb 3.4 oz (63.6 kg)   SpO2 98%   BMI 24.84 kg/m²     Intake/Output Summary (Last 24 hours) at 12/16/2021 1006  Last data filed at 12/16/2021 0859  Gross per 24 hour   Intake 663.1 ml   Output 725 ml   Net -61.9 ml     Wt Readings from Last 2 Encounters:   12/16/21 140 lb 3.4 oz (63.6 kg)   12/14/21 115 lb (52.2 kg)     Constitutional: He is oriented to person, place, and time. He appears well-developed and well-nourished. In no acute distress. Head: Normocephalic and atraumatic. Neck: Neck supple. No JVD present. Carotid bruit is not present.  No mass and no thyromegaly present. No lymphadenopathy present. Cardiovascular: Normal rate, regular rhythm, normal heart sounds and intact distal pulses. Exam reveals no gallop and no friction rub. No murmur heard. Pulmonary/Chest: Effort normal and breath sounds normal. No respiratory distress. He has no wheezes, rhonchi or rales. Abdominal: Soft, non-tender. Bowel sounds and aorta are normal. He exhibits no organomegaly, mass or bruit. Extremities: 3+ pitting edema in bilateral lower extremities. No cyanosis, or clubbing. Pulses are 2+ radial/carotid/dorsalis pedis and posterior tibial bilaterally. Neurological: He is alert and oriented to person, place, and time. He has normal reflexes. No cranial nerve deficit. Coordination normal.   Skin: Skin is warm and dry. There is no rash or diaphoresis. No splinter hemorrhages, janeway lesions, or osler nodes. Psychiatric: He has a normal mood and affect. His speech is normal and behavior is normal.     Personally reviewed and interpreted   EKG Interpretation:     12/14/21:  NSR        Lab Review:   No results found for: TRIG, HDL, LDLCALC, LDLDIRECT, LABVLDL  Lab Results   Component Value Date     12/16/2021    K 4.6 12/16/2021    K 5.5 12/15/2021    BUN 79 12/16/2021    CREATININE 4.8 12/16/2021     Recent Labs     12/15/21  0420 12/15/21  0420 12/16/21  0253   WBC 9.1   < > 8.3   HGB 9.3*  --  8.7*   HCT 28.6*  --  27.0*   *  --  464*    < > = values in this interval not displayed. Diagnostics:     [facility]CHO 12/9/21 ([facility]):  Vegetation of the aortic valve noted on echo. Preserved EF. Technically satisfactory Echocardiography   Normal Left Ventricular size and function, diastolic function is normal   Normal Aortic Root   No intracardiac masses or thrombi   Mild concentric left ventricular hypertrophy noted.  Normal Right ventricle Size and function.  Left ventricular ejection fraction est 65%   Normal Mitral Valve  Aortic valve. with small echogenic structure on the ventricular surface, suspicious for vegetation   Normal Tricuspid valve.  Trace mitral regurgitation   Trace to mild tricuspid regurgitation   Normal color flow across Aortic and pulmonic valve   Normal left Ventricular Diastolic function.  Trivial circumferential pericardial effusion noted without tamponade physiology   Estimated right ventricular systolic pressure 40 mmHg   Normal LA Size           Assessment / Plan:    1. MSSA Bacterial Endocarditis  Echo (performed at [facility]) noted structure on aortic valve suspicious for vegetation. Receiving vancomycin infusions. Continue. DIANA not necessary at this time. Prior to discharge will reevaluate need for Echo. Patient can start diabetic diet. 2. Troponin Elevation  Troponin noted to be elevated to 0.117 on 12/14/21 in setting of no chest pain and normal EKG free of ST abnormalities and signs of injury, ischemia, or infarct. Elevation likely attributable to CKD. No ischemic work-up needed. 3. Acute on Chronic Kidney Disease, Stage IV  Nephrology on board. Creatinine of 4.8 (baseline 3.2). His lower extremity swelling is from this. 4. Type 1 Diabetes Mellitus   Concern for DKA versus HHS. Started DKA treatment protocol. Primary team following. 5. Hyperkalemia  K+ improving. Today WNL. Signed:    Fabiana HUMPHREYS    I reviewed the above assessment and plan with JULIANN Pineda and I agree with it. I independently examined and interviewed the patient. He does not need a DIANA at this point as the echocardiogram demonstrated an aortic valve vegetation. He will need a follow-up surface TTE after 6 to 8 weeks of antibiotics.      Vitals:    12/16/21 0900   BP: 131/87   Pulse: 79   Resp:    Temp:    SpO2: 98%     RR, normal S1/S2, no M/R/G  Lungs bilaterally CTA  Abd soft, NT ND  No edema  No focal neurologic deficits  2+ bilateral radial and femoral pulses    I have personally reviewed all previous testing for this visit today including imaging, lab results and EKG as detailed above. We will sign off for now. Please call with any concerns or questions.

## 2021-12-16 NOTE — PROGRESS NOTES
Patient here from CVU. VSS. Tele placed and confirmed with [facility]. Denies pain or discomfort. Oriented to room.

## 2021-12-16 NOTE — PROGRESS NOTES
4 Eyes Skin Assessment     NAME:  Emani Mccall  YOB: 1995  MEDICAL RECORD NUMBER:  [id number]    The patient is being assess for  Transfer to New Unit    I agree that 2 RN's have performed a thorough Head to Toe Skin Assessment on the patient. ALL assessment sites listed below have been assessed. Areas assessed by both nurses:    Head, Face, Ears, Shoulders, Back, Chest, Arms, Elbows, Hands, Sacrum. Buttock, Coccyx, Ischium and Legs. Feet and Heels        Does the Patient have a Wound?  No noted wound(s)       Antony Prevention initiated:  No   Wound Care Orders initiated:  No    Pressure Injury (Stage 3,4, Unstageable, DTI, NWPT, and Complex wounds) if present place consult order under [de-identified] No    New and Established Ostomies if present place consult order under : No      Nurse 1 eSignature: Electronically signed by Chico Devlin RN on 12/16/21 at 5:27 PM EST    **SHARE this note so that the co-signing nurse is able to place an eSignature**    Nurse 2 eSignature: Electronically signed by Judith Elise RN on 12/16/21 at 6:53 PM EST

## 2021-12-16 NOTE — H&P
Hospital Medicine History & Physical      PCP: No primary care provider on file. Date of Admission: 12/16/2021    Date of Service: Pt seen/examined on 12/16/2021 and Admitted to Inpatient    Chief Complaint: Endocarditis, hyperglycemia, acute on chronic kidney disease      History Of Present Illness: The patient is a 27 y.o. male with past [place] of type 1 diabetes and stage IV chronic kidney disease but has not been seeing a nephrologist recently for further evaluation who presents to [facility] as a transfer from outside facility per transcript as below which is brought from previous progress note by Dr. Leatha Yang of internal medicine: \"In summary, this is a 49-year-old male patient with a past medical history of type 1 diabetes and chronic kidney disease. He was homeless and living in [place] and moved to [place] 3 [place] ago to live with a cousin. Shortly after arriving here he became ill and presented to [facility].  While there he was intubated for airway protection as he was agitated and encephalopathic. He was found to be hyperglycemic and blood cultures grew MSSA bacteremia. He underwent a surface echocardiogram which demonstrated possible vegetation on his aortic valve. He was placed on vancomycin and was discharged. He presented for repeat lab work and was found to be hyperkalemic with progression to MONICA on CKD. He was brought to [facility] for potential need of dialysis. On arrival to the emergency room he was found to have blood sugars in the 800s with pseudohyponatremia and evidence of metabolic acidosis with bicarb of 14. His examination was also consistent with anasarca and significant scrotal edema. He was placed on insulin drip and bicarb was given to his fluids as his bicarb losses were likely also due to renal failure.   Blood above considerations for worsening hyperglycemia as well as concern for worsening infection from the endocarditis, patient denies any recent new fevers, chills from baseline, dizziness, syncope, chest pain, dysuria, blood in urine/stool/sputum, nausea/vomiting/diarrhea/abdominal pain. Past Medical History:        Diagnosis Date    Diabetes ([facility] [address]     Foot drop, left foot     Kidney failure     STAGE 4    Neuropathy        Past Surgical History:    No past surgical history on file. Medications Prior to Admission:    Prior to Admission medications    Medication Sig Start Date End Date Taking? Authorizing Provider   insulin glargine (LANTUS) 100 UNIT/ML injection vial Inject 20 Units into the skin 2 times daily Every morning and every evening    Historical Provider, MD   pregabalin (LYRICA) 100 MG capsule Take 200 mg by mouth 2 times daily. Historical Provider, MD   QUEtiapine (SEROQUEL) 25 MG tablet Take 25 mg by mouth nightly    Historical Provider, MD   FLUoxetine (PROZAC) 20 MG capsule Take 20 mg by mouth nightly    Historical Provider, MD   FERROUS SULFATE PO Take 324 mg by mouth every other day    Historical Provider, MD       Allergies:  Patient has no known allergies. Social History:  The patient currently lives home with cousin    TOBACCO:   reports that he has been smoking cigarettes. He has never used smokeless tobacco.  ETOH:   reports no history of alcohol use. Family History:  Reviewed in detail and negative for DM, Early CAD, Cancer, CVA. Positive as follows:    No family history on file. REVIEW OF SYSTEMS:   as noted in the HPI. All other systems reviewed and negative. PHYSICAL EXAM:    /82   Pulse 80   Temp 98 °F (36.7 °C) (Oral)   Resp 16   Wt 140 lb 3.4 oz (63.6 kg)   SpO2 97%   BMI 24.84 kg/m²     General appearance: Fatigued appearing but overall alert and oriented, no acute respiratory stress  HEENT Normal cephalic, atraumatic without obvious deformity. Pupils equal, round, and reactive to light. Extra ocular muscles intact. Conjunctivae/corneas clear. Mildly dry mucous membranes  Neck: Supple, no JVD  Lungs: Clear to auscultation, bilaterally without Rales/Wheezes/Rhonchi with good respiratory effort. Heart: Regular rate and rhythm with Normal S1/S2 without murmurs, rubs or gallops, point of maximum impulse non-displaced  Abdomen: Soft although slightly firm to the skin, possibly anasarca, no fluid wave, active bowel sounds and nontender  Extremities: 2+ to 3+ bilateral lower extremity symmetric pitting edema  Skin: No rashes  Neurologic: Grossly intact neurologically  Mental status: Alert, oriented, thought content appropriate. Capillary Refill: Acceptable  < 3 seconds  Peripheral Pulses: +3 Easily felt, not easily obliterated with pressure      Chest x-ray: No acute process    CBC   Recent Labs     12/14/21  1914 12/15/21  0420 12/16/21  0253   WBC 8.1 9.1 8.3   HGB 8.7* 9.3* 8.7*   HCT 27.2* 28.6* 27.0*   * 502* 464*      RENAL  Recent Labs     12/15/21  0415 12/15/21  0415 12/15/21  0722 12/15/21  1414 12/16/21  0253      < > 135 135 133*   K 4.5   < > 4.4 5.5* 4.6      < > 108 104 104   CO2 15*   < > 14* 14* 14*   PHOS 5.9*  --   --   --   --    BUN 75*   < > 75* 75* 79*   CREATININE 4.8*   < > 4.6* 4.6* 4.8*    < > = values in this interval not displayed. LFT'S  Recent Labs     12/14/21 1914 12/16/21  0253   AST 28 17   ALT 57 35   BILIDIR <0.2  --    BILITOT <0.2* <0.2   ALKPHOS 98 80     COAG  No results for input(s): INR in the last 72 hours. CARDIAC ENZYMES  No results for input(s): CKTOTAL, CKMB, CKMBINDEX, TROPONINI in the last 72 hours.     U/A:    Lab Results   Component Value Date    COLORU YELLOW 12/15/2021    WBCUA 5-9 12/15/2021    RBCUA 0-2 12/15/2021    BACTERIA NONE SEEN 12/15/2021    LEUKOCYTESUR NEGATIVE 12/15/2021    BLOODU SMALL 12/15/2021    GLUCOSEU >= 1000 12/15/2021       ABG  No results found for: QND6HQL, BEART, H2OCBALR, PHART, THGBART, DFU2PRT, PO2ART, [place]        Active Hospital Problems    Diagnosis Date Noted    CKD (chronic kidney disease) [N18.9] 12/16/2021    DKA (diabetic ketoacidosis) ([facility] [address] [E11.10] 12/16/2021    Type 1 diabetes mellitus ([facility] [address] [E10.9] 12/16/2021    Acute infective endocarditis [I33.0] 12/15/2021    MONICA (acute kidney injury) ([facility] [address] [N17.9] 96/39/7523    Metabolic acidosis [G97.5] 12/15/2021    Peripheral edema [R60.9] 12/15/2021         PHYSICIANS CERTIFICATION:    I certify that Gina Grant is expected to be hospitalized for greater than 2 midnights based on the following assessment and plan:      ASSESSMENT/PLAN:  · Acute infective endocarditis  · Peripheral edema  · MONICA on CKD  · Type 1 diabetes  · Metabolic acidosis    Plan:  · Patient transferred here from previous other facility, was concern for worsening endocarditis but also worsening renal dysfunction and peripheral fluid overload likely multifactorial from not only initial CKD but also endocarditis that was being treated with vancomycin with possible vancomycin induced MONICA as well as bladder outlet obstruction. Patient had a transthoracic echo done that did redemonstrate aortic valve vegetation, does not seem like the DIANA was done at the other facility prior to patient being transferred. Patient had a Farris placed by urology over there due to difficulty getting urine output but is noted that patient did have urinary outlet obstruction at the time.   · X-ray in the morning to evaluate for any worsening of fluid overload, patient had negative chest x-ray prior to transfer here and so did not have any central pulmonary edema  · Repeat labs were ordered and still demonstrated metabolic acidosis but improvement anion gap as well as improvement in glucose  · Start patient on nafcillin for treatment of MSSA endocarditis per previous history  · Continue patient on sodium bicarb infusion  · Start patient on sliding scale insulin every 4 hours with correctional Lantus, keep n.p.o. for now given if patient needs to do a procedure in the morning  · Cardiology consult to consider DIANA in the morning  · Nephrology consult for MONICA on CKD and possible need for new dialysis  · Infection disease consult for MSSA endocarditis  · Repeat labs daily  · Keep Farris catheter in place at this time and do not remove until urology clears the removal since it was a difficult placement    DVT Prophylaxis: Heparin  Diet: Diet NPO  Code Status: Full Code  PT/OT Eval Status: Ambulatory    Dispo -pending clinical course       Ted Perdomo Serum, DO    Thank you No primary care provider on file. for the opportunity to be involved in this patient's care. If you have any questions or concerns please feel free to contact me at [phone].

## 2021-12-16 NOTE — PROGRESS NOTES
Patient admitted to [facility] room [id number] from [address] [place] [address]. Pt connected to CVU continuous bedside monitor. Pt is alert and oriented x4, NSR on monitor, O2 sats>95% on room air. Farris catheter in place. Patient given call light and oriented to room.

## 2021-12-16 NOTE — CONSULTS
[address] [place] [address] Nephrology   [facility]. [facility]  [phone]  Nephrology Consult Note          Patient ID: Nel Zaragoza  Referring/ Physician: Sylvie Tesfaye MD      HPI/Summary:   Nel Zaragoza is being seen by nephrology for MONCIA on CKD stage 4. This is a 28 yo man with past medical history significant for type 1 diabetes, CKD stage IV for which she was following with a nephrologist in [place]. He moved here about 3 weeks ago to live with his cousin because he became homeless when he was in [place]. He does not have doctors in town yet. He was initially taken to [facility] emergency room for altered mental status and agitation. He was found to be hyperglycemic and there was concern for bacterial endocarditis. Patient tells me that a couple years ago he had an issue with methamphetamine but no longer does this. He does smoke cigarettes, tobacco use currently. Denies IV drug use. He says that he has been having generalized edema for the past 2 months at least.  He was diagnosed with diabetes around 6years old has had diabetes for about 20 years. He denies hematuria but has seen very foamy frothy urine in the toilet. He does not use any NSAIDs. He was diagnosed with aortic valve endocarditis MSSA bacteria. He was post beginning IV vancomycin infusions. His baseline creatinine is around 3.2. Currently he is awake and alert in no acute distress. He has significant lower extremity edema 2+ pitting. No rashes or lesions. No petechia. /86  Satting 97% on room air  He is off the insulin drip. He is fully awake and alert and answering questions appropriately. He had a Farris catheter placed, was having a lot of scrotal and perineal  Edema  Urine output 500 cc yesterday, 400 cc out today. Currently patient is on Ancef, Prozac, heparin, Lantus, Lyrica, renally dosed and Seroquel. He is on sodium bicarb at 75 mill equivalents and half-normal saline at 100 cc/h.       Plan:   -Clinically extremity edema. Chest x-ray shows right-sided pleural effusion with adjacent right basilar consolidation    MSSA bacterial endocarditis  There is an aortic valve vegetation suspected on echo from [facility]  He is history receiving vancomycin infusions  He is now on cefazolin  Cardiology has seen and there is no plan for DIANA at this time    Type 1 diabetes  Complicated by renal failure  Blood glucose 862 on admission with anion gap 17  pH 7.3 bicarb 13 on blood gas  Treated with an insulin drip and has improved. [facility] would like to thank you for the opportunity to serve this patient. Please call with any questions or concerns. Marlys Nation MD  [facility]  [employer] [address], [address]  Fax: [phone]  Office: [phone]         CC/Reason for consult:   Reason for consult: MONICA  No chief complaint on file. Review of Systems:   [address] All other remaining systems are negative. Constitutional:  fever, chills, weakness, weight change, fatigue,      Skin:  rash, pruritus, hair loss, bruising, dry skin, petechiae. Head, Face, Neck   headaches, swelling,  cervical adenopathy. Respiratory: shortness of breath, cough, or wheezing  Cardiovascular: chest pain, palpitations, dizzy, edema  Gastrointestinal: nausea, vomiting, diarrhea, constipation,belly pain    Yellow skin, blood in stool  Musculoskeletal:  back pain, muscle weakness, gait problems,       joint pain or swelling. Genitourinary:  dysuria, poor urine flow, flank pain, blood in urine  Neurologic:  vertigo, TIA'S, syncope, seizures, focal weakness  Psychosocial:  insomnia, anxiety, or depression. Additional positive findings: - foamy urine.      PMH/SH/FH:    Medical Hx: reviewed and updated as appropriate  Past Medical History:   Diagnosis Date    Diabetes ([address]     Foot drop, left foot     Kidney failure     STAGE 4    Neuropathy          Surgical Hx: reviewed and updated as appropriate   has no past surgical history on file. Social Hx: reviewed and updated as appropriate  Social History     Tobacco Use    Smoking status: Current Some Day Smoker     Types: Cigarettes    Smokeless tobacco: Never Used   Substance Use Topics    Alcohol use: Never        Family hx: reviewed and updated as appropriate  family history is not on file. Medications:   insulin lispro, 0-6 Units, Q4H  heparin (porcine), 5,000 Units, 3 times per day  sodium chloride flush, 5-40 mL, 2 times per day  pregabalin, 100 mg, Daily  QUEtiapine, 25 mg, Nightly  FLUoxetine, 20 mg, Daily  [START ON 12/17/2021] insulin glargine, 20 Units, Daily  ceFAZolin, 1,000 mg, Q24H       Patient has no known allergies. Allergies:   No Known Allergies      Physical Exam/Objective:   Vitals:    12/16/21 1400   BP: 138/86   Pulse: 83   Resp:    Temp:    SpO2: 96%       Intake/Output Summary (Last 24 hours) at 12/16/2021 1457  Last data filed at 12/16/2021 1346  Gross per 24 hour   Intake 903.1 ml   Output 900 ml   Net 3.1 ml         General appearance: white male in no acute distress, comfortable, communicative, awake and alert. HEENT: no icterus, EOM intact, trachea midline. Neck : no masses, appears symmetrical and no JVD appreciated. Respiratory: Respiratory effort normal, bilateral equal chest rise. No wheeze, no crackles   Cardiovascular: Ausculation shows RRR and  ++ edema   Abdomen: abdomen is soft, non distended, no masses, no pain with palpation. Musculoskeletal:  no joint swelling, no deformity, strength grossly normal.   Skin: no rashes, no induration, no tightening, no jaundice   Neuro: Follows commands, moves all extremities spontaneously   Farris catheter.        Data:   CBC:   Recent Labs     12/14/21  1914 12/15/21  0420 12/16/21  0253   WBC 8.1 9.1 8.3   HGB 8.7* 9.3* 8.7*   HCT 27.2* 28.6* 27.0*   * 502* 464*     BMP:    Recent Labs     12/15/21  0415 12/15/21  0415 12/15/21  9806 12/15/21  1414 12/16/21  0253      < > 135 135 133*   K 4.5   < > 4.4 5.5* 4.6      < > 108 104 104   CO2 15*   < > 14* 14* 14*   BUN 75*   < > 75* 75* 79*   CREATININE 4.8*   < > 4.6* 4.6* 4.8*   GLUCOSE 100   < > 79 308* 327*   MG 1.9  --  2.0  --  2.00   PHOS 5.9*  --   --   --   --     < > = values in this interval not displayed.

## 2021-12-17 LAB
A/G RATIO: 0.7 (ref 1.1–2.2)
ALBUMIN SERPL-MCNC: 1.5 G/DL (ref 3.1–4.9)
ALBUMIN SERPL-MCNC: 2 G/DL (ref 3.4–5)
ALP BLD-CCNC: 74 U/L (ref 40–129)
ALPHA-1-GLOBULIN: 0.3 G/DL (ref 0.2–0.4)
ALPHA-2-GLOBULIN: 1.1 G/DL (ref 0.4–1.1)
ALT SERPL-CCNC: 32 U/L (ref 10–40)
ANION GAP SERPL CALCULATED.3IONS-SCNC: 16 MMOL/L (ref 3–16)
ANTI-NUCLEAR ANTIBODY (ANA): NEGATIVE
AST SERPL-CCNC: 20 U/L (ref 15–37)
BASOPHILS ABSOLUTE: 0.1 K/UL (ref 0–0.2)
BASOPHILS RELATIVE PERCENT: 2 %
BETA GLOBULIN: 1.1 G/DL (ref 0.9–1.6)
BILIRUB SERPL-MCNC: <0.2 MG/DL (ref 0–1)
BUN BLDV-MCNC: 80 MG/DL (ref 7–20)
CALCIUM SERPL-MCNC: 7.5 MG/DL (ref 8.3–10.6)
CHLORIDE BLD-SCNC: 105 MMOL/L (ref 99–110)
CO2: 15 MMOL/L (ref 21–32)
CREAT SERPL-MCNC: 4.8 MG/DL (ref 0.9–1.3)
EOSINOPHILS ABSOLUTE: 0.2 K/UL (ref 0–0.6)
EOSINOPHILS RELATIVE PERCENT: 3.9 %
ESTIMATED AVERAGE GLUCOSE: 292 MG/DL
ESTIMATED AVERAGE GLUCOSE: 300.6 MG/DL
GAMMA GLOBULIN: 0.6 G/DL (ref 0.6–1.8)
GFR AFRICAN AMERICAN: 18
GFR NON-AFRICAN AMERICAN: 15
GLUCOSE BLD-MCNC: 118 MG/DL (ref 70–99)
GLUCOSE BLD-MCNC: 133 MG/DL (ref 70–99)
GLUCOSE BLD-MCNC: 143 MG/DL (ref 70–99)
GLUCOSE BLD-MCNC: 174 MG/DL (ref 70–99)
GLUCOSE BLD-MCNC: 193 MG/DL (ref 70–99)
GLUCOSE BLD-MCNC: 230 MG/DL (ref 70–99)
HBA1C MFR BLD: 11.8 %
HBA1C MFR BLD: 12.1 %
HCT VFR BLD CALC: 24 % (ref 40.5–52.5)
HEMOGLOBIN: 8.1 G/DL (ref 13.5–17.5)
KAPPA, FREE LIGHT CHAINS, SERUM: 85.71 MG/L (ref 3.3–19.4)
KAPPA/LAMBDA RATIO: 2.21 (ref 0.26–1.65)
KAPPA/LAMBDA TEST COMMENT: ABNORMAL
LAMBDA, FREE LIGHT CHAINS, SERUM: 38.72 MG/L (ref 5.71–26.3)
LYMPHOCYTES ABSOLUTE: 1 K/UL (ref 1–5.1)
LYMPHOCYTES RELATIVE PERCENT: 17 %
MAGNESIUM: 1.9 MG/DL (ref 1.8–2.4)
MCH RBC QN AUTO: 29 PG (ref 26–34)
MCHC RBC AUTO-ENTMCNC: 33.6 G/DL (ref 31–36)
MCV RBC AUTO: 86.1 FL (ref 80–100)
MONOCYTES ABSOLUTE: 0.6 K/UL (ref 0–1.3)
MONOCYTES RELATIVE PERCENT: 9.9 %
NEUTROPHILS ABSOLUTE: 3.9 K/UL (ref 1.7–7.7)
NEUTROPHILS RELATIVE PERCENT: 67.2 %
PDW BLD-RTO: 16.1 % (ref 12.4–15.4)
PERFORMED ON: ABNORMAL
PLATELET # BLD: 423 K/UL (ref 135–450)
PMV BLD AUTO: 9.1 FL (ref 5–10.5)
POTASSIUM SERPL-SCNC: 3.9 MMOL/L (ref 3.5–5.1)
RBC # BLD: 2.79 M/UL (ref 4.2–5.9)
SODIUM BLD-SCNC: 136 MMOL/L (ref 136–145)
SPE/IFE INTERPRETATION: NORMAL
TOTAL PROTEIN: 4.5 G/DL (ref 6.4–8.2)
TOTAL PROTEIN: 4.7 G/DL (ref 6.4–8.2)
WBC # BLD: 5.8 K/UL (ref 4–11)

## 2021-12-17 PROCEDURE — 85025 COMPLETE CBC W/AUTO DIFF WBC: CPT

## 2021-12-17 PROCEDURE — 2580000003 HC RX 258: Performed by: INTERNAL MEDICINE

## 2021-12-17 PROCEDURE — 83735 ASSAY OF MAGNESIUM: CPT

## 2021-12-17 PROCEDURE — 6360000002 HC RX W HCPCS: Performed by: STUDENT IN AN ORGANIZED HEALTH CARE EDUCATION/TRAINING PROGRAM

## 2021-12-17 PROCEDURE — 6360000002 HC RX W HCPCS: Performed by: INTERNAL MEDICINE

## 2021-12-17 PROCEDURE — 6370000000 HC RX 637 (ALT 250 FOR IP): Performed by: NURSE PRACTITIONER

## 2021-12-17 PROCEDURE — 6370000000 HC RX 637 (ALT 250 FOR IP): Performed by: INTERNAL MEDICINE

## 2021-12-17 PROCEDURE — 80053 COMPREHEN METABOLIC PANEL: CPT

## 2021-12-17 PROCEDURE — 6370000000 HC RX 637 (ALT 250 FOR IP): Performed by: STUDENT IN AN ORGANIZED HEALTH CARE EDUCATION/TRAINING PROGRAM

## 2021-12-17 PROCEDURE — 99233 SBSQ HOSP IP/OBS HIGH 50: CPT | Performed by: INTERNAL MEDICINE

## 2021-12-17 PROCEDURE — 2060000000 HC ICU INTERMEDIATE R&B

## 2021-12-17 PROCEDURE — 83036 HEMOGLOBIN GLYCOSYLATED A1C: CPT

## 2021-12-17 PROCEDURE — 94760 N-INVAS EAR/PLS OXIMETRY 1: CPT

## 2021-12-17 PROCEDURE — 93971 EXTREMITY STUDY: CPT

## 2021-12-17 PROCEDURE — 36415 COLL VENOUS BLD VENIPUNCTURE: CPT

## 2021-12-17 RX ORDER — PREGABALIN 100 MG/1
200 CAPSULE ORAL 2 TIMES DAILY
Status: DISCONTINUED | OUTPATIENT
Start: 2021-12-17 | End: 2021-12-23 | Stop reason: HOSPADM

## 2021-12-17 RX ORDER — AMLODIPINE BESYLATE 5 MG/1
5 TABLET ORAL DAILY
Status: DISCONTINUED | OUTPATIENT
Start: 2021-12-17 | End: 2021-12-20

## 2021-12-17 RX ORDER — TRISODIUM CITRATE DIHYDRATE AND CITRIC ACID MONOHYDRATE 500; 334 MG/5ML; MG/5ML
30 SOLUTION ORAL ONCE
Status: COMPLETED | OUTPATIENT
Start: 2021-12-17 | End: 2021-12-17

## 2021-12-17 RX ORDER — FUROSEMIDE 10 MG/ML
40 INJECTION INTRAMUSCULAR; INTRAVENOUS 2 TIMES DAILY
Status: DISCONTINUED | OUTPATIENT
Start: 2021-12-17 | End: 2021-12-19

## 2021-12-17 RX ADMIN — FUROSEMIDE 40 MG: 10 INJECTION, SOLUTION INTRAMUSCULAR; INTRAVENOUS at 17:42

## 2021-12-17 RX ADMIN — HEPARIN SODIUM 5000 UNITS: 5000 INJECTION INTRAVENOUS; SUBCUTANEOUS at 14:41

## 2021-12-17 RX ADMIN — INSULIN LISPRO 2 UNITS: 100 INJECTION, SOLUTION INTRAVENOUS; SUBCUTANEOUS at 04:30

## 2021-12-17 RX ADMIN — PREGABALIN 200 MG: 100 CAPSULE ORAL at 23:17

## 2021-12-17 RX ADMIN — CEFAZOLIN SODIUM 1000 MG: 1 INJECTION, POWDER, FOR SOLUTION INTRAMUSCULAR; INTRAVENOUS at 12:23

## 2021-12-17 RX ADMIN — INSULIN LISPRO 1 UNITS: 100 INJECTION, SOLUTION INTRAVENOUS; SUBCUTANEOUS at 21:08

## 2021-12-17 RX ADMIN — HYDRALAZINE HYDROCHLORIDE 5 MG: 20 INJECTION INTRAMUSCULAR; INTRAVENOUS at 08:07

## 2021-12-17 RX ADMIN — INSULIN LISPRO 1 UNITS: 100 INJECTION, SOLUTION INTRAVENOUS; SUBCUTANEOUS at 17:42

## 2021-12-17 RX ADMIN — HEPARIN SODIUM 5000 UNITS: 5000 INJECTION INTRAVENOUS; SUBCUTANEOUS at 21:06

## 2021-12-17 RX ADMIN — ACETAMINOPHEN 650 MG: 325 TABLET ORAL at 21:06

## 2021-12-17 RX ADMIN — PREGABALIN 100 MG: 100 CAPSULE ORAL at 08:07

## 2021-12-17 RX ADMIN — SODIUM CHLORIDE, PRESERVATIVE FREE 40 ML: 5 INJECTION INTRAVENOUS at 09:52

## 2021-12-17 RX ADMIN — FUROSEMIDE 40 MG: 10 INJECTION, SOLUTION INTRAMUSCULAR; INTRAVENOUS at 12:19

## 2021-12-17 RX ADMIN — QUETIAPINE FUMARATE 25 MG: 25 TABLET ORAL at 21:06

## 2021-12-17 RX ADMIN — HEPARIN SODIUM 5000 UNITS: 5000 INJECTION INTRAVENOUS; SUBCUTANEOUS at 07:03

## 2021-12-17 RX ADMIN — SODIUM CITRATE AND CITRIC ACID MONOHYDRATE 30 ML: 500; 334 SOLUTION ORAL at 14:41

## 2021-12-17 RX ADMIN — AMLODIPINE BESYLATE 5 MG: 5 TABLET ORAL at 14:41

## 2021-12-17 RX ADMIN — INSULIN GLARGINE 20 UNITS: 100 INJECTION, SOLUTION SUBCUTANEOUS at 08:07

## 2021-12-17 RX ADMIN — FLUOXETINE 20 MG: 20 CAPSULE ORAL at 08:07

## 2021-12-17 RX ADMIN — SODIUM CHLORIDE, PRESERVATIVE FREE 10 ML: 5 INJECTION INTRAVENOUS at 21:07

## 2021-12-17 ASSESSMENT — PAIN SCALES - GENERAL
PAINLEVEL_OUTOF10: 0
PAINLEVEL_OUTOF10: 7

## 2021-12-17 NOTE — PROGRESS NOTES
Progress Note  Admit Date: 12/16/2021       CC: No chief complaint on file. Overnight Events: No acute overnight events. Pt seen and examined at bedside. Feeling fatigued, otherwise no dyspnea, chest pain, palpitations, N/V. Interval History: Refer to H&P    Review of Systems  General appearance: alert, appears stated age and cooperative. +fatigue  Skin: Skin color, texture, normal. No rashes or lesions  HEENT: No nose bleed, headache, vision problems  CV: No chest pain, tightness, pressure,   Respiratory: No c/o SOB, ALANIZ, Orthopnea, PND  GI: No abdominal pain, black stool, bloating  Limbs: No c/o edema, pain, intermittent claudication, joint pains. +LE edema  Neuro: No dizziness, lightheadedness, syncope, gait problems, memory problems  Psych: grossly normal. No SI/depression. Scheduled Medications:    insulin lispro  0-6 Units SubCUTAneous Q4H    heparin (porcine)  5,000 Units SubCUTAneous 3 times per day    sodium chloride flush  5-40 mL IntraVENous 2 times per day    pregabalin  100 mg Oral Daily    QUEtiapine  25 mg Oral Nightly    FLUoxetine  20 mg Oral Daily    insulin glargine  20 Units SubCUTAneous Daily    ceFAZolin  1,000 mg IntraVENous Q24H      PRN Medications: acetaminophen **OR** acetaminophen, sodium chloride, sodium chloride flush, glucose, dextrose, glucagon (rDNA), dextrose, hydrALAZINE  Diet: ADULT DIET;  Regular; 3 carb choices (45 gm/meal)    Continuous Infusions:   sodium chloride      dextrose         PHYSICAL EXAM:  BP (!) 195/108   Pulse 87   Temp 98.2 °F (36.8 °C) (Oral)   Resp 18   Wt 140 lb 3.4 oz (63.6 kg)   SpO2 97%   BMI 24.84 kg/m²   Recent Labs     12/16/21  1407 12/16/21  1754 12/16/21  2312 12/17/21  0318 12/17/21  0653   POCGLU 200* 85 166* 230* 118*       Intake/Output Summary (Last 24 hours) at 12/17/2021 1025  Last data filed at 12/17/2021 0547  Gross per 24 hour   Intake 240 ml   Output 2325 ml   Net -2085 ml       General appearance: No apparent distress, appears stated age and cooperative. HEENT: Pupils equal, round, and reactive to light. Conjunctivae/corneas clear. Neck: Supple, with full range of motion. No jugular venous distention. Trachea midline. Respiratory:  Normal respiratory effort. Clear to auscultation, bilaterally without Rales/Wheezes/Rhonchi. Cardiovascular: Regular rate and rhythm with normal S1/S2 without murmurs, rubs or gallops. Abdomen: Soft, non-tender, non-distended with normal bowel sounds. Musculoskeletal: No clubbing, cyanosis or edema bilaterally. Full range of motion without deformity. Skin: Skin color, texture, turgor normal.  No rashes or lesions. Neurologic:  Neurovascularly intact without any focal sensory/motor deficits. Cranial nerves: II-XII intact, grossly non-focal.  Psychiatric: Alert and oriented, thought content appropriate, normal insight    LABS:  Recent Labs     12/15/21  0420 12/16/21  0253 12/17/21  0440   WBC 9.1 8.3 5.8   HGB 9.3* 8.7* 8.1*   HCT 28.6* 27.0* 24.0*   * 464* 423                                                                    Recent Labs     12/16/21  0253 12/16/21  1524 12/17/21  0440   * 133* 136   K 4.6 3.8 3.9    101 105   CO2 14* 17* 15*   BUN 79* 77* 80*   CREATININE 4.8* 4.8* 4.8*   GLUCOSE 327* 127* 193*     Recent Labs     12/14/21  1914 12/16/21  0253 12/17/21  0440   AST 28 17 20   ALT 57 35 32   BILITOT <0.2* <0.2 <0.2   ALKPHOS 98 80 74     No results for input(s): TROPONINI in the last 72 hours. No results for input(s): BNP in the last 72 hours. No results for input(s): CHOL, HDL in the last 72 hours. Invalid input(s): LDLCALCU  No results for input(s): INR in the last 72 hours.     Assessment & Plan:      MSSA endocarditis of aortic valve  - Cardiology consulted for possible DIANA  - continue Cefazolin    MONICA on CKD4 likely sec to vancomycin toxicity  - Cr 4.8 from baseline of around 3.0-3.5  - Nephrology consulted  - Had 500cc UOP from straight cath  -cont cefazolin    Nonanion gap metabolic acidosis   - Bicarb at 14  - Continue Bicarb gtt for now  - Nephro to eval need for HD    NSTEMI, likely type II  - Cardiology consulted  - No ischemic changes on EKG, probably not ACS    DM1  - SSI  - Hypoglycemia protocol   - Changed Lantus 40U qd to 20U qd as pt is currently NPO    Hyperkalemia, resolved    Features of CHF with anasarca  Swollen LUE- will need to r/o DVT  -Do doppler US LUE  Lasix 40mg iv  bid    The patient and / or the family were informed of the results of any tests, a time was given to answer questions, a plan was proposed and they agreed with plan. DVT Prophylaxis: Heparin  Diet: ADULT DIET;  Regular; 3 carb choices (45 gm/meal)  Code Status: Full Code    Disposition: Inpatient pending clinical improvement      Nichole Rivera MD   Internal Medicine  12/17/2021 10:25 AM  Reach via [employer]

## 2021-12-17 NOTE — PROGRESS NOTES
[facility]                                               Progress note    Summary:   Mynor Mandel is being seen by nephrology for MONICA on CKD stage 4. Admitted with endocarditis and DKA. Has known type 1 DM, complicated by CKD stage 4. Only recently [place] here [place] [facility] [place] Lives closer to [place] with his cousin now. He was transferred from OSH to here. Interval History  Seen at bedside. Feels SOB and has swelling. No abdominal pain or nausea. /93  Sating 96% on room air.   UO 2.5 L yesterday   Negative 1.9 l for the day   Cr is stable at 4.8       Plan:   - schedule lasix 40 mg IV BID.   - add amlodipine 5 mg   - metabolic acidosis should improve with diuresis. Will give one dose of bicitra  - there are no acute indications for dialysis. - check iron stores and ferritin. - check PTH and vit D level. - with this level of CKD at baseline there is no utility in doing a biopsy. Will have a lot of scar and likely has diabetic nephropathy based on his history. Lisa Higgins MD  [facility]  Office: [phone]    Assessment:   Mynor Mandel is being seen by nephrology for MONICA on CKD stage 4. This is a 26 yo man with past medical history significant for type 1 diabetes, CKD stage IV for which she was following with a nephrologist in [place]. He moved here about 3 weeks ago to live with his cousin because he became homeless when he was in [place]. He does not have doctors in town yet. He was initially taken to [facility] emergency room for altered mental status and agitation. He was found to be hyperglycemic and there was concern for bacterial endocarditis. Patient tells me that a couple years ago he had an issue with methamphetamine but no longer does this. He does smoke cigarettes, tobacco use currently. Denies IV drug use.   He says that he has been having generalized edema for the past 2 months at least.  He was diagnosed with diabetes around 6 years old has had diabetes for about 20 years. He denies hematuria but has seen very foamy frothy urine in the toilet. He does not use any NSAIDs. He was diagnosed with aortic valve endocarditis MSSA bacteria. He was post beginning IV vancomycin infusions. His baseline creatinine is around 3.2. Currently he is awake and alert in no acute distress. He has significant lower extremity edema 2+ pitting. No rashes or lesions. No petechia. /86  Satting 97% on room air  He is off the insulin drip. He is fully awake and alert and answering questions appropriately. He had a Farris catheter placed, was having a lot of scrotal and perineal  Edema  Urine output 500 cc yesterday, 400 cc out today. Currently patient is on Ancef, Prozac, heparin, Lantus, Lyrica, renally dosed and Seroquel. He is on sodium bicarb at 75 mill equivalents and half-normal saline at 100 cc/h. Plan:   -Clinically volume overloaded, stop IV fluids. We will decide about IV Lasix based on repeat labs  -We will repeat renal panel now, check complements DUTCH ANCA anti-GBM. He has underlying CKD stage IV so this is likely diabetic nephropathy with acute kidney injury. But it appears he has nephrotic range proteinuria which can be seen with other glomerular processes.  -There are no acute indications for dialysis  -Based on his history of CKD stage IV, he has increased risk of progressing to ESRD and should begin access planning for dialysis with a nephrologist close to his home    Acute kidney injury  Multifactorial MONICA in the setting of endocarditis, antibiotics including vancomycin  Creatinine peaked at 4.8  Baseline creatinine 3.2  Has Farris's not obstructed.   random Vanco level of 24.6  Is significant CKD so low reserve for renal injury  He was hyperglycemic so may become hypovolemic  Clinically he has a lot of edema and profound hypoalbuminemia  Nephrotic range proteinuria in the setting of known type 1 diabetes  Baseline creatinine reported to be around 3.2, he was stage IV CKD already  Protein creatinine ratio 7.8 g urinalysis showed no blood 5-9 WBCs no casts, positive for glucose urea  Does have anemia but no thrombocytopenia to suggest TMA process. c3 and c4 normal.   DUTCH  ANCA  AntiGBM    Chronic kidney disease stage IV  Has diabetic nephropathy most likely in setting of longstanding proteinuria and uncontrolled diabetes  Was seeing a nephrologist in [place] and was told that he was stage IV  He has not been referred for transplant work-up or dialysis access as of now. Was having issues with drug use    Metabolic acidosis  Likely secondary to renal failure  Was on a bicarb drip  Had mild anion gap with hyperglycemia which improved with an insulin drip. Lactate negative at 0.9  Urine tox was negative    Generalized edema  EF 65% mild concentric hypertrophy  Has nephrotic range proteinuria and hypoalbuminemia  He has significant pitting lower extremity edema. Chest x-ray shows right-sided pleural effusion with adjacent right basilar consolidation    MSSA bacterial endocarditis  There is an aortic valve vegetation suspected on echo from [facility]  He is history receiving vancomycin infusions  He is now on cefazolin  Cardiology has seen and there is no plan for DIANA at this time    Type 1 diabetes  Complicated by renal failure  Blood glucose 862 on admission with anion gap 17  pH 7.3 bicarb 13 on blood gas  Treated with an insulin drip and has improved. ROS:   Positives Listed Bold. All other remaining systems are negative. Constitutional:  fever, chills, weakness, weight change, fatigue,      Skin:  rash, pruritus, hair loss, bruising, dry skin, petechiae. Head, Face, Neck   headaches, swelling,  cervical adenopathy.      Respiratory: shortness of breath, cough, or wheezing  Cardiovascular: chest pain, palpitations, dizzy, edema  Gastrointestinal: nausea, vomiting, diarrhea, constipation,belly pain    Yellow skin, blood in stool  Musculoskeletal:  back pain, muscle weakness, gait problems,       joint pain or swelling. Genitourinary:  dysuria, poor urine flow, flank pain, blood in urine  Neurologic:  vertigo, TIA'S, syncope, seizures, focal weakness  Psychosocial:  insomnia, anxiety, or depression. Additional positive findings: -     PMH:   Past medical history, surgical history, social history, family history are reviewed and updated as appropriate. Reviewed current medication list.   Allergies reviewed and updated as needed. PE:   Vitals:    12/17/21 1130   BP: (!) 178/93   Pulse: 95   Resp: 18   Temp: 97.5 °F (36.4 °C)   SpO2: 96%       General appearance: white male in NAD, fully alert and oriented. Comfortable. HEENT: EOM intact, no icterus. Trachea is midline. Neck : No masses, appears symmetrical, no JVD  Respiratory: Respiratory effort appears normal, bilateral equal chest rise, no wheeze, no crackles   Cardiovascular: Ausculation shows RRR ++ edema  Abdomen: No visible mass or tenderness, non distended. Musculoskeletal:  Joints with no swelling or deformity. Skin:no rashes, ulcers, induration, no jaundice. Neuro: face symmetric, no focal deficits. Appropriate responses.        Lab Results   Component Value Date    CREATININE 4.8 (H) 12/17/2021    BUN 80 (H) 12/17/2021     12/17/2021    K 3.9 12/17/2021     12/17/2021    CO2 15 (L) 12/17/2021      Lab Results   Component Value Date    WBC 5.8 12/17/2021    HGB 8.1 (L) 12/17/2021    HCT 24.0 (L) 12/17/2021    MCV 86.1 12/17/2021     12/17/2021     Lab Results   Component Value Date    CALCIUM 7.5 (L) 12/17/2021    PHOS 5.9 (H) 12/16/2021

## 2021-12-17 NOTE — CARE COORDINATION
INITIAL CASE MANAGEMENT ASSESSMENT    Met with patient to assess possible discharge needs. Explained Case Management role/services. Living Situation: Lives in an apartment with his cousin Kathryn Hudson [phone] and his cousin's wife. Patient stated he would like for his cousin to be his primary decision maker if something were to happen and he were unable to make his own decisions. No HCPOA paperwork in place. Discussed [address] [place] [address] for decision making. States he has not used illegal drugs for 2 years. ADLs: States he is independent with all ADL's     DME: Cane and leg brace for his left lower leg. PT/OT Recs: No recommendations at this time. Active Services: N/A     Transportation: Does not drive. Melanie Sanchez [phone] will provide transportation at discharge. Medications: [facility]    PCP: Does not have one. [facility] Physician list provided to patient. HD/PD: N/A    PLAN/COMMENTS: Plan is to discharge home. No needs identified at this time. Discussed possibility of HHC. Patient is agreeable to [facility] if needed. Provided contact information for patient or family to call with any questions. Will follow and assist as needed.     Chloe MARK RN  Case Management  [phone]    Electronically signed by Chloe Altman RN on 12/17/2021 at 4:20 PM

## 2021-12-17 NOTE — PROGRESS NOTES
no immunization history on file for this patient. Social History:   Social History     Tobacco Use    Smoking status: Current Some Day Smoker     Types: Cigarettes    Smokeless tobacco: Never Used   Vaping Use    Vaping Use: Every day   Substance Use Topics    Alcohol use: Never    Drug use: Yes     Types: Marijuana [employer])     Social History     Tobacco Use   Smoking Status Current Some Day Smoker    Types: Cigarettes   Smokeless Tobacco Never Used      Family History : Brother and Uncle with DM++       REVIEW OF SYSTEMS:     Constitutional:  negative for fevers, chills, night sweats  Eyes:  negative for blurred vision, eye discharge, visual disturbance   HEENT:  negative for hearing loss, ear drainage,nasal congestion  Respiratory:  negative for cough, shortness of breath +  or hemoptysis   Cardiovascular:  negative for chest pain, palpitations, syncope  Gastrointestinal:  negative for nausea, vomiting, diarrhea, constipation, abdominal pain  Genitourinary:  negative for frequency, dysuria, urinary incontinence, hematuria  Hematologic/Lymphatic:  negative for easy bruising, bleeding and lymphadenopathy  Allergic/Immunologic:  negative for recurrent infections, angioedema, anaphylaxis   Endocrine:  negative for weight changes, polyuria, polydipsia and polyphagia  Musculoskeletal:  negative for joint  pain, swelling++, decreased range of motion  Integumentary: No rashes, skin lesions  Neurological:  negative for headaches, slurred speech, unilateral weakness  Psychiatric: negative for hallucinations,confusion,agitation.                 PHYSICAL EXAM:      Vitals:    BP (!) 195/108   Pulse 87   Temp 98.2 °F (36.8 °C) (Oral)   Resp 18   Wt 140 lb 3.4 oz (63.6 kg)   SpO2 97%   BMI 24.84 kg/m²     General Appearance: alert,in some acute distress,++ pallor, no  icterus SHORT STATURE : Chronic ill appearing man+ Ofelia orbital edema++  Skin: warm and dry, no rash or erythema  Head: normocephalic and atraumatic  Eyes: pupils equal, round, and reactive to light, conjunctivae normal  ENT: tympanic membrane, external ear and ear canal normal bilaterally, nose without deformity, nasal mucosa and turbinates normal without polyps  Neck: supple and non-tender without mass, no thyromegaly  no cervical lymphadenopathy  Pulmonary/Chest: Bi basal crepts++  no wheezes, rales or rhonchi, normal air movement, no respiratory distress  Cardiovascular: l S1 and S2, ESM+  murmurs, rubs, clicks, or gallops, no carotid bruits  Abdomen: soft, non-tender, non-distended, normal bowel sounds, no masses or organomegaly  Extremities: no cyanosis, clubbing or ++ edema  Musculoskeletal: normal range of motion, no joint swelling, deformity or tenderness  Integumentary: No rashes, no abnormal skin lesions, no petechiae  Neurologic: reflexes normal and symmetric, no cranial nerve deficit  Psych:  Orientation, sensorium, mood normal            Lines: iv  Scabs over the hands from picking +     Data Review:    CBC:   Lab Results   Component Value Date    WBC 5.8 12/17/2021    HGB 8.1 (L) 12/17/2021    HCT 24.0 (L) 12/17/2021    MCV 86.1 12/17/2021     12/17/2021     RENAL:   Lab Results   Component Value Date    CREATININE 4.8 (H) 12/17/2021    BUN 80 (H) 12/17/2021     12/17/2021    K 3.9 12/17/2021     12/17/2021    CO2 15 (L) 12/17/2021     SED RATE:   Lab Results   Component Value Date    SEDRATE 106 12/16/2021     CK: No results found for: CKTOTAL  CRP:   Lab Results   Component Value Date    CRP 13.2 12/16/2021     Hepatic Function Panel:   Lab Results   Component Value Date    ALKPHOS 74 12/17/2021    ALT 32 12/17/2021    AST 20 12/17/2021    PROT 4.7 12/17/2021    BILITOT <0.2 12/17/2021    BILIDIR <0.2 12/14/2021    LABALBU 2.0 12/17/2021     UA:  Lab Results   Component Value Date    COLORU YELLOW 12/15/2021    GLUCOSEU >= 1000 12/15/2021    BILIRUBINUR NEGATIVE 12/15/2021    KETUA NEGATIVE 12/15/2021    BLOODU SMALL 12/15/2021    PHUR 6.5 12/15/2021    PROTEINU 300 12/15/2021    UROBILINOGEN 0.2 12/15/2021    NITRU NEGATIVE 12/15/2021    LEUKOCYTESUR NEGATIVE 12/15/2021      Urine Microscopic:   Lab Results   Component Value Date    BACTERIA NONE SEEN 12/15/2021    WBCUA 5-9 12/15/2021    RBCUA 0-2 12/15/2021    EPIU 0-2 12/15/2021     Urine Reflex to Culture: No results found for: URRFLXCULT      MICRO: cultures reviewed and updated by me   Blood Culture:   Component 8 d ago Comments   BLOOD CULTURE  SEE BELOW~should be CONSIDERED a SYNERGYSTIC agent ONLY. Source: blood-Adult-suboptimal <5.5oz./set volume   Site: Peripheral Vein   Collected: 12/08/21   14:00   Current Antibiotics: not stated   Antibiotics comment:     --------------------------------------------------- C O M M E N T S   ----------------------------------------------------------------   .                           STATUS OF ORDERED AND REPORTED TESTS   BLOOD CULTURE                          FINAL           12/10/21       BLOOD CULTURE                                   FINAL         12/10/21 19:59       Organism  01  Staphylococcus aureus                  In the treatment of gram positive infections, GENTAMICIN                  should be CONSIDERED a SYNERGYSTIC agent ONLY.                Ciprofloxacin and Levofloxacin, regardless of in vitro                  sensitivity, should not be used for staphylococcal infections                  other than uncomplicated lower UTIs.                Moxifloxacin, regardless of in vitro sensitivity, should                  not be used for staphylococcal infections.      _____________________________________________________________________________   Organism        01-staaur     Antibiotic      PATSY  Intrp  *Adult dosage             Pk bld level Pk urine   lev.   _____________________________________________________________________________   Gentamicin     <=0.5   S   IV 1.7mg/kg q 8 h          5-7          >=100           Oxacillin       0.5    S   IV 500mg                   43                           Vancomycin       1     S   IV 1000mg q 12 h           20-50        800             Trimethoprim/Ruggiero <=10   S   NF588uqJII/800mgSMX q 12h   1-2TMP/15-81G10-07FRW/97SM                              IV 160mgTMP/800mgSMX  q 8 h9TMP/105 SMX                 _____________________________________________________________________________   S=Susceptible I=Intermediate R=Resistant Susceptibility is determined by   comparing the PATSY of organism to the achievable   blood or urine level of drug. Level at the site of infection should be a   minimum of 5-10 times the PATSY.   _____________________________________________________________________________   PATSY and blood and urine levels=mcg/ml. *Standard dosages from [facility] to   Antimicrobial Therapy and assumes moderate   infection in normal adult populations. For pts. with renal or liver disease   consult PDR or pharmacist.   _____________________________________________________________________________       Lab Results   Component Value Date    BC No growth-preliminary  12/15/2021       Respiratory Culture:  No results found for: Lory Perez  AFB:No results found for: AFBSMEAR  Viral Culture:  No results found for: COVID19  Urine Culture: No results for input(s): Zahraa Amaya in the last 72 hours. IMAGING:    XR CHEST PORTABLE   Final Result   Right-sided pleural effusion with adjacent right basilar consolidation,   either atelectasis or pneumonia. Anatomical Region Laterality Modality   Head -- Magnetic Resonance          Conclusion:    Technically satisfactory Echocardiography    Normal Left Ventricular size and function, diastolic function   is normal    Normal Aortic Root    No intracardiac masses or thrombi    Mild concentric left ventricular hypertrophy noted.  Normal Right ventricle Size and function.     Left ventricular ejection fraction est 65%    Normal Mitral Valve     Aortic valve. with small echogenic structure on the ventricular   surface, suspicious for vegetation    Normal Tricuspid valve.  Trace mitral regurgitation    Trace to mild tricuspid regurgitation    Normal color flow across Aortic and pulmonic valve    Normal left Ventricular Diastolic function.     Trivial circumferential pericardial effusion noted without   tamponade physiology    Estimated right ventricular systolic pressure 40 mmHg    Normal LA Size     Clinical correlation needed with respect to the aortic valve    Impression  Performed by RADIOLOGY  IMPRESSION:     Normal MRI of the brain without contrast.     Electronically signed by: Carolin Farfan MD  2021 4:03 PM [facility] Workstation: [id number]     Narrative  Performed by RADIOLOGY    EXAM: MRI OF THE BRAIN WITHOUT CONTRAST     HISTORY: No Previous, History-neuro deficit, stroke suspected, altered mental status, concern for abscess in a drug user who has staph bacteremia;       COMPARISON: CT dated 2021     TECHNIQUE : Multiplanar, multisequence imaging of the brain was obtained without contrast.     FINDINGS:        Anatomical Region Laterality Modality   Abdomen -- Ultrasound   Pelvis -- --       Impression  Performed by CARDIOLOGY  IMPRESSION:   Essentially normal bilateral renal ultrasound other than the borderline high   resistive index of each kidney     Electronically Signed By: Kenia Villalobos MD on 2021 10:34 AM    Narrative  Performed by CARDIOLOGY  Patient Name: Gladis Enciso   Patient MRN: [id number]   Patient : 1995      All the pertinent images and reports for the current Hospitalization were reviewed by me     Scheduled Meds:   insulin lispro  0-6 Units SubCUTAneous Q4H    heparin (porcine)  5,000 Units SubCUTAneous 3 times per day    sodium chloride flush  5-40 mL IntraVENous 2 times per day    pregabalin  100 mg Oral Daily    QUEtiapine  25 mg Oral Nightly    FLUoxetine  20 mg Oral Daily  insulin glargine  20 Units SubCUTAneous Daily    ceFAZolin  1,000 mg IntraVENous Q24H       Continuous Infusions:   sodium chloride      dextrose         PRN Meds:  acetaminophen **OR** acetaminophen, sodium chloride, sodium chloride flush, glucose, dextrose, glucagon (rDNA), dextrose, hydrALAZINE      Assessment:     Patient Active Problem List   Diagnosis    DKA, type 1, not at goal [facility])    Acute infective endocarditis    MONICA [facility]) [facility] [address]    Metabolic acidosis    Peripheral edema    CKD (chronic kidney disease)    DKA (diabetic ketoacidosis) ([facility] [address]    Type 1 diabetes mellitus ([facility] [address]     Tx from out side facility   Recent admit for DKA  Resp failure was intubated and now extubated  DM poor control   Staph aureus Bacteremia on 12/8/21 -[facility]SA  TTE out side facility with small echogenicity on the Aortic valve  Aortic valve Endocarditis suspected  MONICA on CKD  USG Kidney no obstruction    Anasarca from Renal disease  Poor social situation   Anemia likely from CKD      Unfortunately worsening renal function in the setting of Endocarditis and Bacteremia , and he is volume over load - Nafcillin less ideal currently given the gross volume over load and salt excess and vegetation on TTE is small we can choose IV Cefazolin to treat the bacteremia and Endocarditis         He is still volume over loaded and will cont IV Cefazolin as planned is he ends up on Dialysis we can coordinate IV abx with HD sessions    May need Chest line if he is not going on dialysis for long term IV abx anticipate x 4 weeks of abx therapy       Labs, Microbiology, Radiology and all the pertinent results from current hospitalization and  care every where were reviewed  by me as a part of the evaluation   Plan:   1. Cont IV Cefazolin x 1 gm  q 24 HRS as of now X STOP DATE  1/8  2. If he goes on Dialysis will change to every other day dosing   3. Dm CONTROL  4. WILL need to complete abx course and check repeat TTE  5.  Check repeat Blood cx so far negative   6. ESR, CRP  7. Will need chest line placement if he is not going on HD for IV abx  8. He lives in [place] with his cousin and may need IV abx coordination if he is to d/c as out patient through [facility] with patient/Family and Nursing   Risk of Complications/Morbidity: High      · Illness(es)/ Infection present that pose threat to bodily function. · There is potential for severe exacerbation of infection/side effects of treatment. Therapy requires intensive monitoring for antimicrobial agent toxic    Discussed with patient/Family and Nursing staff     Thanks for allowing me to participate in your patient's care and please call me with any questions or concerns.     Leona Hooks MD  Infectious Disease  [id number]  Physician  Phone: [phone]   Fax : [phone]

## 2021-12-17 NOTE — PLAN OF CARE
Problem: Pain:  Goal: Pain level will decrease  Description: Pain level will decrease  12/17/2021 1424 by Rose Fraga RN  Outcome: Ongoing     Problem: Pain:  Goal: Control of acute pain  Description: Control of acute pain  12/17/2021 1424 by Rose Fraga RN  Outcome: Ongoing

## 2021-12-18 LAB
A/G RATIO: 0.7 (ref 1.1–2.2)
ALBUMIN SERPL-MCNC: 2 G/DL (ref 3.4–5)
ALP BLD-CCNC: 70 U/L (ref 40–129)
ALT SERPL-CCNC: 17 U/L (ref 10–40)
ANION GAP SERPL CALCULATED.3IONS-SCNC: 16 MMOL/L (ref 3–16)
AST SERPL-CCNC: 18 U/L (ref 15–37)
BASOPHILS ABSOLUTE: 0.1 K/UL (ref 0–0.2)
BASOPHILS RELATIVE PERCENT: 2.2 %
BILIRUB SERPL-MCNC: <0.2 MG/DL (ref 0–1)
BUN BLDV-MCNC: 90 MG/DL (ref 7–20)
CALCIUM SERPL-MCNC: 7.8 MG/DL (ref 8.3–10.6)
CHLORIDE BLD-SCNC: 103 MMOL/L (ref 99–110)
CO2: 18 MMOL/L (ref 21–32)
CREAT SERPL-MCNC: 5.1 MG/DL (ref 0.9–1.3)
EOSINOPHILS ABSOLUTE: 0.2 K/UL (ref 0–0.6)
EOSINOPHILS RELATIVE PERCENT: 4 %
GFR AFRICAN AMERICAN: 16
GFR NON-AFRICAN AMERICAN: 14
GLUCOSE BLD-MCNC: 212 MG/DL (ref 70–99)
GLUCOSE BLD-MCNC: 234 MG/DL (ref 70–99)
GLUCOSE BLD-MCNC: 250 MG/DL (ref 70–99)
GLUCOSE BLD-MCNC: 262 MG/DL (ref 70–99)
GLUCOSE BLD-MCNC: 318 MG/DL (ref 70–99)
HCT VFR BLD CALC: 24.8 % (ref 40.5–52.5)
HEMOGLOBIN: 8.2 G/DL (ref 13.5–17.5)
LYMPHOCYTES ABSOLUTE: 0.9 K/UL (ref 1–5.1)
LYMPHOCYTES RELATIVE PERCENT: 17 %
MAGNESIUM: 2 MG/DL (ref 1.8–2.4)
MCH RBC QN AUTO: 28.6 PG (ref 26–34)
MCHC RBC AUTO-ENTMCNC: 33 G/DL (ref 31–36)
MCV RBC AUTO: 86.9 FL (ref 80–100)
MONOCYTES ABSOLUTE: 0.7 K/UL (ref 0–1.3)
MONOCYTES RELATIVE PERCENT: 13 %
NEUTROPHILS ABSOLUTE: 3.5 K/UL (ref 1.7–7.7)
NEUTROPHILS RELATIVE PERCENT: 63.8 %
PDW BLD-RTO: 16.5 % (ref 12.4–15.4)
PERFORMED ON: ABNORMAL
PLATELET # BLD: 433 K/UL (ref 135–450)
PMV BLD AUTO: 8.9 FL (ref 5–10.5)
POTASSIUM SERPL-SCNC: 4.1 MMOL/L (ref 3.5–5.1)
RBC # BLD: 2.85 M/UL (ref 4.2–5.9)
SODIUM BLD-SCNC: 137 MMOL/L (ref 136–145)
TOTAL PROTEIN: 4.8 G/DL (ref 6.4–8.2)
WBC # BLD: 5.5 K/UL (ref 4–11)

## 2021-12-18 PROCEDURE — 6370000000 HC RX 637 (ALT 250 FOR IP): Performed by: INTERNAL MEDICINE

## 2021-12-18 PROCEDURE — 6370000000 HC RX 637 (ALT 250 FOR IP): Performed by: STUDENT IN AN ORGANIZED HEALTH CARE EDUCATION/TRAINING PROGRAM

## 2021-12-18 PROCEDURE — 6360000002 HC RX W HCPCS: Performed by: STUDENT IN AN ORGANIZED HEALTH CARE EDUCATION/TRAINING PROGRAM

## 2021-12-18 PROCEDURE — 6370000000 HC RX 637 (ALT 250 FOR IP): Performed by: NURSE PRACTITIONER

## 2021-12-18 PROCEDURE — 83735 ASSAY OF MAGNESIUM: CPT

## 2021-12-18 PROCEDURE — 2060000000 HC ICU INTERMEDIATE R&B

## 2021-12-18 PROCEDURE — 80053 COMPREHEN METABOLIC PANEL: CPT

## 2021-12-18 PROCEDURE — 2580000003 HC RX 258: Performed by: INTERNAL MEDICINE

## 2021-12-18 PROCEDURE — 94760 N-INVAS EAR/PLS OXIMETRY 1: CPT

## 2021-12-18 PROCEDURE — 85025 COMPLETE CBC W/AUTO DIFF WBC: CPT

## 2021-12-18 PROCEDURE — 36415 COLL VENOUS BLD VENIPUNCTURE: CPT

## 2021-12-18 PROCEDURE — 6360000002 HC RX W HCPCS: Performed by: INTERNAL MEDICINE

## 2021-12-18 RX ADMIN — SODIUM CHLORIDE, PRESERVATIVE FREE 10 ML: 5 INJECTION INTRAVENOUS at 21:29

## 2021-12-18 RX ADMIN — INSULIN LISPRO 2 UNITS: 100 INJECTION, SOLUTION INTRAVENOUS; SUBCUTANEOUS at 08:06

## 2021-12-18 RX ADMIN — FUROSEMIDE 40 MG: 10 INJECTION, SOLUTION INTRAMUSCULAR; INTRAVENOUS at 08:06

## 2021-12-18 RX ADMIN — INSULIN LISPRO 3 UNITS: 100 INJECTION, SOLUTION INTRAVENOUS; SUBCUTANEOUS at 11:57

## 2021-12-18 RX ADMIN — QUETIAPINE FUMARATE 25 MG: 25 TABLET ORAL at 21:29

## 2021-12-18 RX ADMIN — INSULIN LISPRO 4 UNITS: 100 INJECTION, SOLUTION INTRAVENOUS; SUBCUTANEOUS at 17:22

## 2021-12-18 RX ADMIN — PREGABALIN 200 MG: 100 CAPSULE ORAL at 21:29

## 2021-12-18 RX ADMIN — HEPARIN SODIUM 5000 UNITS: 5000 INJECTION INTRAVENOUS; SUBCUTANEOUS at 14:38

## 2021-12-18 RX ADMIN — HEPARIN SODIUM 5000 UNITS: 5000 INJECTION INTRAVENOUS; SUBCUTANEOUS at 06:51

## 2021-12-18 RX ADMIN — FLUOXETINE 20 MG: 20 CAPSULE ORAL at 08:05

## 2021-12-18 RX ADMIN — HYDRALAZINE HYDROCHLORIDE 5 MG: 20 INJECTION INTRAMUSCULAR; INTRAVENOUS at 08:05

## 2021-12-18 RX ADMIN — AMLODIPINE BESYLATE 5 MG: 5 TABLET ORAL at 08:05

## 2021-12-18 RX ADMIN — INSULIN LISPRO 1 UNITS: 100 INJECTION, SOLUTION INTRAVENOUS; SUBCUTANEOUS at 21:29

## 2021-12-18 RX ADMIN — SODIUM CHLORIDE, PRESERVATIVE FREE 10 ML: 5 INJECTION INTRAVENOUS at 08:06

## 2021-12-18 RX ADMIN — PREGABALIN 200 MG: 100 CAPSULE ORAL at 08:05

## 2021-12-18 RX ADMIN — CEFAZOLIN SODIUM 1000 MG: 1 INJECTION, POWDER, FOR SOLUTION INTRAMUSCULAR; INTRAVENOUS at 11:57

## 2021-12-18 RX ADMIN — HEPARIN SODIUM 5000 UNITS: 5000 INJECTION INTRAVENOUS; SUBCUTANEOUS at 21:29

## 2021-12-18 RX ADMIN — FUROSEMIDE 40 MG: 10 INJECTION, SOLUTION INTRAMUSCULAR; INTRAVENOUS at 17:22

## 2021-12-18 RX ADMIN — INSULIN GLARGINE 20 UNITS: 100 INJECTION, SOLUTION SUBCUTANEOUS at 08:06

## 2021-12-18 ASSESSMENT — PAIN SCALES - GENERAL
PAINLEVEL_OUTOF10: 0

## 2021-12-18 NOTE — PROGRESS NOTES
Chemistry called with a critical lab value for this patient Bun is now 90.3 and creatinine is 5.1  Previous values were a BUN of 80 and creatinine of 4.8

## 2021-12-18 NOTE — PLAN OF CARE
Problem: Pain:  Goal: Pain level will decrease  Description: Pain level will decrease  12/18/2021 0834 by Munira Watson RN  Outcome: Ongoing     Problem: Pain:  Goal: Control of acute pain  Description: Control of acute pain  12/18/2021 0834 by Munira Watson RN  Outcome: Ongoing     Problem: Pain:  Goal: Control of chronic pain  Description: Control of chronic pain  12/18/2021 0834 by Munira Watson RN  Outcome: Ongoing     Problem: Pain:  Goal: Patient's pain/discomfort is manageable  Description: Patient's pain/discomfort is manageable  12/18/2021 0834 by Munira Watson RN  Outcome: Ongoing  Assessing pain using appropriate pain rating scale q shift and PRN. Medicating pt as prescribed and indicated. Offering pt non-pharmacologic pain interventions. Reassessing pain and pts response to pain intervention. Problem: Skin Integrity:  Goal: Will show no infection signs and symptoms  Description: Will show no infection signs and symptoms  12/18/2021 0834 by Munira Watson RN  Outcome: Ongoing     Problem: Skin Integrity:  Goal: Absence of new skin breakdown  Description: Absence of new skin breakdown  12/18/2021 0834 by Munira Watson RN  Outcome: Ongoing   Assessing rich scale Q shift. Performing skin assessments every shift. Maintaining dry and clean skin. Promoting adequate nutritional intake. Heels elevated off bed. Performing skin care q shift. Utilizing lift equipment when indicated. Problem: Falls - Risk of:  Goal: Will remain free from falls  Description: Will remain free from falls  12/18/2021 0834 by Munira Watson RN  Outcome: Ongoing     Problem: Falls - Risk of:  Goal: Absence of physical injury  Description: Absence of physical injury  12/18/2021 0834 by Munira Watson RN  Outcome: Ongoing  Bed alarm on, bed in lowest position, wheels locked. Fall risk assessment completed every shift. Nonskid socks on, fall sign posted. Pt educated on use of call light.       Problem: Infection:  Goal: improve  12/18/2021 0834 by Mimi Castro RN  Outcome: Ongoing     Problem: Nutritional:  Goal: Maintenance of adequate nutrition will be supported  Description: Maintenance of adequate nutrition will be supported  12/18/2021 0834 by Mimi Castro RN  Outcome: Ongoing  Encouraging pt to eat at least 50% of all meals. Assisting with feeding when needed. collaborating with dietician for optimal nutrition.      Problem: Physical Regulation:  Goal: Complications related to the disease process, condition or treatment will be avoided or minimized  Description: Complications related to the disease process, condition or treatment will be avoided or minimized  12/18/2021 0834 by Mimi Castro RN  Outcome: Ongoing     Problem: Urinary Elimination:  Goal: Ability to achieve and maintain adequate urine output will be supported  Description: Ability to achieve and maintain adequate urine output will be supported  12/18/2021 0834 by Mimi Castro RN  Outcome: Ongoing  Farris catheter in place to strictly monitor output

## 2021-12-18 NOTE — PLAN OF CARE
Problem: Pain:  Goal: Pain level will decrease  Description: Pain level will decrease  Outcome: Ongoing  Goal: Control of acute pain  Description: Control of acute pain  Outcome: Ongoing  Goal: Control of chronic pain  Description: Control of chronic pain  Outcome: Ongoing  Goal: Patient's pain/discomfort is manageable  Description: Patient's pain/discomfort is manageable  Outcome: Ongoing     Problem: Skin Integrity:  Goal: Will show no infection signs and symptoms  Description: Will show no infection signs and symptoms  Outcome: Ongoing  Goal: Absence of new skin breakdown  Description: Absence of new skin breakdown  Outcome: Ongoing     Problem: Falls - Risk of:  Goal: Will remain free from falls  Description: Will remain free from falls  Outcome: Ongoing  Goal: Absence of physical injury  Description: Absence of physical injury  Outcome: Ongoing     Problem: Infection:  Goal: Will remain free from infection  Description: Will remain free from infection  Outcome: Ongoing     Problem: Safety:  Goal: Free from accidental physical injury  Description: Free from accidental physical injury  Outcome: Ongoing  Goal: Free from intentional harm  Description: Free from intentional harm  Outcome: Ongoing     Problem: Daily Care:  Goal: Daily care needs are met  Description: Daily care needs are met  Outcome: Ongoing     Problem: Skin Integrity:  Goal: Skin integrity will stabilize  Description: Skin integrity will stabilize  Outcome: Ongoing     Problem: Discharge Planning:  Goal: Patients continuum of care needs are met  Description: Patients continuum of care needs are met  Outcome: Ongoing     Problem:  Activity:  Goal: Risk for activity intolerance will decrease  Description: Risk for activity intolerance will decrease  Outcome: Ongoing     Problem: Coping:  Goal: Ability to identify and develop effective coping behavior will improve  Description: Ability to identify and develop effective coping behavior will improve  Outcome: Ongoing     Problem: Fluid Volume:  Goal: Will show no signs or symptoms of fluid imbalance  Description: Will show no signs or symptoms of fluid imbalance  Outcome: Ongoing     Problem: Health Behavior:  Goal: Ability to manage health-related needs will improve  Description: Ability to manage health-related needs will improve  Outcome: Ongoing     Problem: Nutritional:  Goal: Maintenance of adequate nutrition will be supported  Description: Maintenance of adequate nutrition will be supported  Outcome: Ongoing     Problem: Physical Regulation:  Goal: Complications related to the disease process, condition or treatment will be avoided or minimized  Description: Complications related to the disease process, condition or treatment will be avoided or minimized  Outcome: Ongoing     Problem: Urinary Elimination:  Goal: Ability to achieve and maintain adequate urine output will be supported  Description: Ability to achieve and maintain adequate urine output will be supported  Outcome: Ongoing

## 2021-12-18 NOTE — PROGRESS NOTES
[facility]                                               Progress note    Summary:   Jeff Beckham is being seen by nephrology for MONICA on CKD stage 4. Admitted with endocarditis and DKA. Has known type 1 DM, complicated by CKD stage 4. Only recently moved here form [facility] ([place] Lives closer to [place] with his cousin now. He was transferred from OSH to here. Interval History  Seen at bedside. SOB improved but still has significant anasarca. On lasix IV 40 mg BID  Made 4450 mL urine yesterday  Cr 5.1 from 4.8  BP still slightly elevated. Wants to leave. Plan:   - continue schedule lasix 40 mg IV BID. Significant anasarca  - add amlodipine 5 mg   - acidosis slightly better  - there are no acute indications for dialysis. - with this level of CKD at baseline there is no utility in doing a biopsy. Will have a lot of scar and likely has diabetic nephropathy based on his history. Farzana Osuna MD  [facility]  Office: [phone]      Assessment:  Acute kidney injury  Multifactorial MONICA in the setting of endocarditis, antibiotics including vancomycin  Creatinine peaked at 4.8  Baseline creatinine 3.2  Has Farris's not obstructed. random Vanco level of 24.6  Is significant CKD so low reserve for renal injury  He was hyperglycemic so may become hypovolemic  Clinically he has a lot of edema and profound hypoalbuminemia  Nephrotic range proteinuria in the setting of known type 1 diabetes  Baseline creatinine reported to be around 3.2, he was stage IV CKD already  Protein creatinine ratio 7.8 g urinalysis showed no blood 5-9 WBCs no casts, positive for glucose urea  Does have anemia but no thrombocytopenia to suggest TMA process.   c3 and c4 normal.   DUTCH  ANCA  AntiGBM    Chronic kidney disease stage IV  Has diabetic nephropathy most likely in setting of longstanding proteinuria and uncontrolled diabetes  Was seeing a nephrologist in [place] and was told that he was stage IV  He has not been referred for transplant work-up or dialysis access as of now. Was having issues with drug use    Metabolic acidosis  Likely secondary to renal failure  Was on a bicarb drip  Had mild anion gap with hyperglycemia which improved with an insulin drip. Lactate negative at 0.9  Urine tox was negative    Generalized edema  EF 65% mild concentric hypertrophy  Has nephrotic range proteinuria and hypoalbuminemia  He has significant pitting lower extremity edema. Chest x-ray shows right-sided pleural effusion with adjacent right basilar consolidation    MSSA bacterial endocarditis  There is an aortic valve vegetation suspected on echo from [facility]  He is history receiving vancomycin infusions  He is now on cefazolin  Cardiology has seen and there is no plan for DIANA at this time    Type 1 diabetes  Complicated by renal failure  Blood glucose 862 on admission with anion gap 17  pH 7.3 bicarb 13 on blood gas  Treated with an insulin drip and has improved. HPI:  Tracie Corona is being seen by nephrology for MONICA on CKD stage 4. This is a 28 yo man with past medical history significant for type 1 diabetes, CKD stage IV for which she was following with a nephrologist in [place]. He moved here about 3 weeks ago to live with his cousin because he became homeless when he was in [place]. He does not have doctors in town yet. He was initially taken to [facility] emergency room for altered mental status and agitation. He was found to be hyperglycemic and there was concern for bacterial endocarditis. Patient tells me that a couple years ago he had an issue with methamphetamine but no longer does this. He does smoke cigarettes, tobacco use currently. Denies IV drug use. He says that he has been having generalized edema for the past 2 months at least.  He was diagnosed with diabetes around 6years old has had diabetes for about 20 years.   He denies hematuria but has seen very foamy frothy urine in the toilet. He does not use any NSAIDs. He was diagnosed with aortic valve endocarditis MSSA bacteria. He was post beginning IV vancomycin infusions. His baseline creatinine is around 3.2. Currently he is awake and alert in no acute distress. He has significant lower extremity edema 2+ pitting. No rashes or lesions. No petechia. ROS:   Positives Listed Bold. All other remaining systems are negative. Constitutional:  fever, chills, weakness, weight change, fatigue,      Skin:  rash, pruritus, hair loss, bruising, dry skin, petechiae. Head, Face, Neck   headaches, swelling,  cervical adenopathy. Respiratory: shortness of breath, cough, or wheezing  Cardiovascular: chest pain, palpitations, dizzy, edema  Gastrointestinal: nausea, vomiting, diarrhea, constipation,belly pain    Yellow skin, blood in stool  Musculoskeletal:  back pain, muscle weakness, gait problems,       joint pain or swelling. Genitourinary:  dysuria, poor urine flow, flank pain, blood in urine  Neurologic:  vertigo, TIA'S, syncope, seizures, focal weakness  Psychosocial:  insomnia, anxiety, or depression. Additional positive findings: -     PMH:   Past medical history, surgical history, social history, family history are reviewed and updated as appropriate. Reviewed current medication list.   Allergies reviewed and updated as needed. PE:   Vitals:    12/18/21 1554   BP: (!) 154/86   Pulse: 98   Resp: 18   Temp: 97.9 °F (36.6 °C)   SpO2: 94%       General appearance: white male in NAD, fully alert and oriented. Comfortable. HEENT: EOM intact, no icterus. Trachea is midline. Neck : No masses, appears symmetrical, no JVD  Respiratory: Respiratory effort appears normal, bilateral equal chest rise, no wheeze, no crackles   Cardiovascular: Ausculation shows RRR ++ edema  Abdomen: No visible mass or tenderness, non distended. Musculoskeletal:  Joints with no swelling or deformity.    Skin:no rashes, ulcers, induration, no jaundice. Neuro: face symmetric, no focal deficits. Appropriate responses.        Lab Results   Component Value Date    CREATININE 5.1 (HH) 12/18/2021    BUN 90 (HH) 12/18/2021     12/18/2021    K 4.1 12/18/2021     12/18/2021    CO2 18 (L) 12/18/2021      Lab Results   Component Value Date    WBC 5.5 12/18/2021    HGB 8.2 (L) 12/18/2021    HCT 24.8 (L) 12/18/2021    MCV 86.9 12/18/2021     12/18/2021     Lab Results   Component Value Date    CALCIUM 7.8 (L) 12/18/2021    PHOS 5.9 (H) 12/16/2021

## 2021-12-18 NOTE — PROGRESS NOTES
pts cousin at bedside, this RN spoke with pt and cousin regarding plan of care. At this time, both verbalize understanding of plan of care and pt is agreeable to stay for treatment.     Electronically signed by Wero Robledo RN on 12/18/2021 at 6:02 PM

## 2021-12-18 NOTE — PROGRESS NOTES
Progress Note  Admit Date: 12/16/2021       CC: No chief complaint on file. Overnight Events: No acute overnight events. Pt seen and examined at bedside. Feeling fatigued, otherwise no dyspnea, chest pain, palpitations, N/V. Interval History: Refer to H&P    Review of Systems  General appearance: alert, appears stated age and cooperative. +fatigue  Skin: Skin color, texture, normal. No rashes or lesions  HEENT: No nose bleed, headache, vision problems  CV: No chest pain, tightness, pressure,   Respiratory: No c/o SOB, ALANIZ, Orthopnea, PND  GI: No abdominal pain, black stool, bloating  Limbs: No c/o edema, pain, intermittent claudication, joint pains. +LE edema  Neuro: No dizziness, lightheadedness, syncope, gait problems, memory problems  Psych: grossly normal. No SI/depression. Scheduled Medications:    furosemide  40 mg IntraVENous BID    amLODIPine  5 mg Oral Daily    insulin lispro  0-6 Units SubCUTAneous TID WC    insulin lispro  0-3 Units SubCUTAneous Nightly    pregabalin  200 mg Oral BID    heparin (porcine)  5,000 Units SubCUTAneous 3 times per day    sodium chloride flush  5-40 mL IntraVENous 2 times per day    QUEtiapine  25 mg Oral Nightly    FLUoxetine  20 mg Oral Daily    insulin glargine  20 Units SubCUTAneous Daily    ceFAZolin  1,000 mg IntraVENous Q24H      PRN Medications: acetaminophen **OR** acetaminophen, sodium chloride, sodium chloride flush, glucose, dextrose, glucagon (rDNA), dextrose, hydrALAZINE  Diet: ADULT DIET;  Regular; 3 carb choices (45 gm/meal)    Continuous Infusions:   sodium chloride      dextrose         PHYSICAL EXAM:  BP (!) 198/99   Pulse 80   Temp 97.4 °F (36.3 °C) (Oral)   Resp 20   Wt 145 lb 8.1 oz (66 kg)   SpO2 96%   BMI 25.77 kg/m²   Recent Labs     12/17/21  0653 12/17/21  1128 12/17/21  1518 12/17/21  1949 12/18/21  0728   POCGLU 118* 133* 143* 174* 234*       Intake/Output Summary (Last 24 hours) at 12/18/2021 1015  Last data filed at 12/18/2021 0656  Gross per 24 hour   Intake 360 ml   Output 4450 ml   Net -4090 ml       General appearance: No apparent distress, appears stated age and cooperative. HEENT: Pupils equal, round, and reactive to light. Conjunctivae/corneas clear. Neck: Supple, with full range of motion. No jugular venous distention. Trachea midline. Respiratory:  Normal respiratory effort. Clear to auscultation, bilaterally without Rales/Wheezes/Rhonchi. Cardiovascular: Regular rate and rhythm with normal S1/S2 without murmurs, rubs or gallops. Abdomen: Soft, non-tender, non-distended with normal bowel sounds. Musculoskeletal: No clubbing, cyanosis or edema bilaterally. Full range of motion without deformity. Skin: Skin color, texture, turgor normal.  No rashes or lesions. Neurologic:  Neurovascularly intact without any focal sensory/motor deficits. Cranial nerves: II-XII intact, grossly non-focal.  Psychiatric: Alert and oriented, thought content appropriate, normal insight    LABS:  Recent Labs     12/16/21  0253 12/17/21 0440 12/18/21  0453   WBC 8.3 5.8 5.5   HGB 8.7* 8.1* 8.2*   HCT 27.0* 24.0* 24.8*   * 423 433                                                                    Recent Labs     12/16/21  1524 12/17/21  0440 12/18/21  0453   * 136 137   K 3.8 3.9 4.1    105 103   CO2 17* 15* 18*   BUN 77* 80* 90*   CREATININE 4.8* 4.8* 5.1*   GLUCOSE 127* 193* 250*     Recent Labs     12/16/21  0253 12/17/21  0440 12/18/21  0453   AST 17 20 18   ALT 35 32 17   BILITOT <0.2 <0.2 <0.2   ALKPHOS 80 74 70     No results for input(s): TROPONINI in the last 72 hours. No results for input(s): BNP in the last 72 hours. No results for input(s): CHOL, HDL in the last 72 hours. Invalid input(s): LDLCALCU  No results for input(s): INR in the last 72 hours.     Assessment & Plan:      MSSA endocarditis of aortic valve  - Cardiology consulted for possible DIANA  - continue Cefazolin    MONICA on CKD4 likely sec to vancomycin toxicity  - Cr 4.8 from baseline of around 3.0-3.5  - Nephrology consulted  - Had 500cc UOP from straight cath  -cont cefazolin    Nonanion gap metabolic acidosis   - Bicarb at 14  - Continue Bicarb gtt for now  - Nephro to eval need for HD    NSTEMI, likely type II  - Cardiology consulted  - No ischemic changes on EKG, probably not ACS    DM1  - SSI  - Hypoglycemia protocol   - Changed Lantus 40U qd to 20U qd as pt is currently NPO    Hyperkalemia, resolved    Features of CHF with anasarca  Swollen LUE- will need to r/o DVT  -Do doppler US LUE  Lasix 40mg iv  bid    The patient and / or the family were informed of the results of any tests, a time was given to answer questions, a plan was proposed and they agreed with plan. DVT Prophylaxis: Heparin  Diet: ADULT DIET;  Regular; 3 carb choices (45 gm/meal)  Code Status: Full Code    Disposition: Inpatient pending clinical improvement      Mani Dimas MD   Internal Medicine  12/18/2021 10:15 AM  Reach via [employer]

## 2021-12-18 NOTE — PROGRESS NOTES
Pt called this RN into the room and stated \"my cousin is on her way to pick me up, she's 2 hours away\" . This RN stated that the patient is not discharged at this time and is still receiving IV antibiotics. This RN stated that the patient is not medically stable for discharge at this time. The patient stated \"well it's hard for my cousin to get here from 2 hours away, she's pregnant\". Pt asked for this RN to speak with patients cousin when she arrives. This RN agreed to conversation regarding need for patient to stay admitted for safety.     Electronically signed by Roxie Price RN on 12/18/2021 at 4:05 PM

## 2021-12-19 LAB
A/G RATIO: 0.8 (ref 1.1–2.2)
ALBUMIN SERPL-MCNC: 2.2 G/DL (ref 3.4–5)
ALP BLD-CCNC: 75 U/L (ref 40–129)
ALT SERPL-CCNC: 12 U/L (ref 10–40)
ANION GAP SERPL CALCULATED.3IONS-SCNC: 15 MMOL/L (ref 3–16)
ANION GAP SERPL CALCULATED.3IONS-SCNC: 17 MMOL/L (ref 3–16)
AST SERPL-CCNC: 25 U/L (ref 15–37)
BASOPHILS ABSOLUTE: 0.1 K/UL (ref 0–0.2)
BASOPHILS RELATIVE PERCENT: 2.1 %
BETA-HYDROXYBUTYRATE: 0.12 MMOL/L (ref 0–0.27)
BILIRUB SERPL-MCNC: <0.2 MG/DL (ref 0–1)
BILIRUBIN URINE: NEGATIVE
BLOOD, URINE: ABNORMAL
BUN BLDV-MCNC: 106 MG/DL (ref 7–20)
BUN BLDV-MCNC: 96 MG/DL (ref 7–20)
BUN BLDV-MCNC: 98 MG/DL (ref 7–20)
BUN BLDV-MCNC: 99 MG/DL (ref 7–20)
CALCIUM SERPL-MCNC: 7.6 MG/DL (ref 8.3–10.6)
CALCIUM SERPL-MCNC: 7.7 MG/DL (ref 8.3–10.6)
CALCIUM SERPL-MCNC: 7.9 MG/DL (ref 8.3–10.6)
CALCIUM SERPL-MCNC: 8.2 MG/DL (ref 8.3–10.6)
CHLORIDE BLD-SCNC: 102 MMOL/L (ref 99–110)
CHLORIDE BLD-SCNC: 99 MMOL/L (ref 99–110)
CLARITY: CLEAR
CO2: 16 MMOL/L (ref 21–32)
CO2: 18 MMOL/L (ref 21–32)
COLOR: YELLOW
CREAT SERPL-MCNC: 5.1 MG/DL (ref 0.9–1.3)
CREAT SERPL-MCNC: 5.3 MG/DL (ref 0.9–1.3)
CREAT SERPL-MCNC: 5.4 MG/DL (ref 0.9–1.3)
CREAT SERPL-MCNC: 5.5 MG/DL (ref 0.9–1.3)
EOSINOPHILS ABSOLUTE: 0.2 K/UL (ref 0–0.6)
EOSINOPHILS RELATIVE PERCENT: 2.6 %
EPITHELIAL CELLS, UA: 2 /HPF (ref 0–5)
GFR AFRICAN AMERICAN: 15
GFR AFRICAN AMERICAN: 15
GFR AFRICAN AMERICAN: 16
GFR AFRICAN AMERICAN: 16
GFR NON-AFRICAN AMERICAN: 12
GFR NON-AFRICAN AMERICAN: 13
GFR NON-AFRICAN AMERICAN: 13
GFR NON-AFRICAN AMERICAN: 14
GLUCOSE BLD-MCNC: 193 MG/DL (ref 70–99)
GLUCOSE BLD-MCNC: 221 MG/DL (ref 70–99)
GLUCOSE BLD-MCNC: 278 MG/DL (ref 70–99)
GLUCOSE BLD-MCNC: 279 MG/DL (ref 70–99)
GLUCOSE BLD-MCNC: 421 MG/DL (ref 70–99)
GLUCOSE BLD-MCNC: 483 MG/DL (ref 70–99)
GLUCOSE BLD-MCNC: 513 MG/DL (ref 70–99)
GLUCOSE BLD-MCNC: 517 MG/DL (ref 70–99)
GLUCOSE BLD-MCNC: 525 MG/DL (ref 70–99)
GLUCOSE BLD-MCNC: 533 MG/DL (ref 70–99)
GLUCOSE BLD-MCNC: 549 MG/DL (ref 70–99)
GLUCOSE BLD-MCNC: 94 MG/DL (ref 70–99)
GLUCOSE BLD-MCNC: 96 MG/DL (ref 70–99)
GLUCOSE URINE: >=1000 MG/DL
HCT VFR BLD CALC: 23.6 % (ref 40.5–52.5)
HEMOGLOBIN: 7.7 G/DL (ref 13.5–17.5)
HYALINE CASTS: 1 /LPF (ref 0–8)
KETONES, URINE: ABNORMAL MG/DL
LEUKOCYTE ESTERASE, URINE: NEGATIVE
LYMPHOCYTES ABSOLUTE: 1 K/UL (ref 1–5.1)
LYMPHOCYTES RELATIVE PERCENT: 14.9 %
MAGNESIUM: 1.9 MG/DL (ref 1.8–2.4)
MCH RBC QN AUTO: 28.6 PG (ref 26–34)
MCHC RBC AUTO-ENTMCNC: 32.5 G/DL (ref 31–36)
MCV RBC AUTO: 88.2 FL (ref 80–100)
MICROSCOPIC EXAMINATION: YES
MONOCYTES ABSOLUTE: 0.8 K/UL (ref 0–1.3)
MONOCYTES RELATIVE PERCENT: 12.3 %
NEUTROPHILS ABSOLUTE: 4.5 K/UL (ref 1.7–7.7)
NEUTROPHILS RELATIVE PERCENT: 68.1 %
NITRITE, URINE: NEGATIVE
PDW BLD-RTO: 16.8 % (ref 12.4–15.4)
PERFORMED ON: ABNORMAL
PERFORMED ON: NORMAL
PH UA: 7 (ref 5–8)
PLATELET # BLD: 387 K/UL (ref 135–450)
PMV BLD AUTO: 9.2 FL (ref 5–10.5)
POTASSIUM REFLEX MAGNESIUM: 3.9 MMOL/L (ref 3.5–5.1)
POTASSIUM REFLEX MAGNESIUM: 4.2 MMOL/L (ref 3.5–5.1)
POTASSIUM REFLEX MAGNESIUM: 4.2 MMOL/L (ref 3.5–5.1)
POTASSIUM SERPL-SCNC: 4.9 MMOL/L (ref 3.5–5.1)
PROTEIN UA: >=300 MG/DL
RBC # BLD: 2.68 M/UL (ref 4.2–5.9)
RBC UA: 2 /HPF (ref 0–4)
SARS-COV-2, NAAT: NOT DETECTED
SODIUM BLD-SCNC: 132 MMOL/L (ref 136–145)
SODIUM BLD-SCNC: 135 MMOL/L (ref 136–145)
SPECIFIC GRAVITY UA: 1.02 (ref 1–1.03)
TOTAL PROTEIN: 4.9 G/DL (ref 6.4–8.2)
URINE TYPE: ABNORMAL
UROBILINOGEN, URINE: 0.2 E.U./DL
WBC # BLD: 6.6 K/UL (ref 4–11)
WBC UA: 13 /HPF (ref 0–5)

## 2021-12-19 PROCEDURE — 6370000000 HC RX 637 (ALT 250 FOR IP): Performed by: INTERNAL MEDICINE

## 2021-12-19 PROCEDURE — 6370000000 HC RX 637 (ALT 250 FOR IP): Performed by: NURSE PRACTITIONER

## 2021-12-19 PROCEDURE — 85025 COMPLETE CBC W/AUTO DIFF WBC: CPT

## 2021-12-19 PROCEDURE — 6360000002 HC RX W HCPCS: Performed by: STUDENT IN AN ORGANIZED HEALTH CARE EDUCATION/TRAINING PROGRAM

## 2021-12-19 PROCEDURE — 2580000003 HC RX 258: Performed by: INTERNAL MEDICINE

## 2021-12-19 PROCEDURE — 80053 COMPREHEN METABOLIC PANEL: CPT

## 2021-12-19 PROCEDURE — 6370000000 HC RX 637 (ALT 250 FOR IP): Performed by: STUDENT IN AN ORGANIZED HEALTH CARE EDUCATION/TRAINING PROGRAM

## 2021-12-19 PROCEDURE — 84156 ASSAY OF PROTEIN URINE: CPT

## 2021-12-19 PROCEDURE — 6360000002 HC RX W HCPCS: Performed by: INTERNAL MEDICINE

## 2021-12-19 PROCEDURE — 82010 KETONE BODYS QUAN: CPT

## 2021-12-19 PROCEDURE — 2060000000 HC ICU INTERMEDIATE R&B

## 2021-12-19 PROCEDURE — 83735 ASSAY OF MAGNESIUM: CPT

## 2021-12-19 PROCEDURE — 81001 URINALYSIS AUTO W/SCOPE: CPT

## 2021-12-19 PROCEDURE — 87635 SARS-COV-2 COVID-19 AMP PRB: CPT

## 2021-12-19 PROCEDURE — 36415 COLL VENOUS BLD VENIPUNCTURE: CPT

## 2021-12-19 RX ORDER — FUROSEMIDE 10 MG/ML
80 INJECTION INTRAMUSCULAR; INTRAVENOUS 2 TIMES DAILY
Status: DISCONTINUED | OUTPATIENT
Start: 2021-12-19 | End: 2021-12-23

## 2021-12-19 RX ADMIN — SODIUM CHLORIDE, PRESERVATIVE FREE 10 ML: 5 INJECTION INTRAVENOUS at 08:41

## 2021-12-19 RX ADMIN — HEPARIN SODIUM 5000 UNITS: 5000 INJECTION INTRAVENOUS; SUBCUTANEOUS at 14:09

## 2021-12-19 RX ADMIN — SODIUM CHLORIDE, PRESERVATIVE FREE 10 ML: 5 INJECTION INTRAVENOUS at 21:30

## 2021-12-19 RX ADMIN — AMLODIPINE BESYLATE 5 MG: 5 TABLET ORAL at 08:39

## 2021-12-19 RX ADMIN — HEPARIN SODIUM 5000 UNITS: 5000 INJECTION INTRAVENOUS; SUBCUTANEOUS at 05:54

## 2021-12-19 RX ADMIN — INSULIN LISPRO 12 UNITS: 100 INJECTION, SOLUTION INTRAVENOUS; SUBCUTANEOUS at 11:45

## 2021-12-19 RX ADMIN — PREGABALIN 200 MG: 100 CAPSULE ORAL at 08:39

## 2021-12-19 RX ADMIN — INSULIN HUMAN 20 UNITS: 100 INJECTION, SOLUTION PARENTERAL at 11:17

## 2021-12-19 RX ADMIN — INSULIN LISPRO 20 UNITS: 100 INJECTION, SOLUTION INTRAVENOUS; SUBCUTANEOUS at 08:41

## 2021-12-19 RX ADMIN — FLUOXETINE 20 MG: 20 CAPSULE ORAL at 08:39

## 2021-12-19 RX ADMIN — HEPARIN SODIUM 5000 UNITS: 5000 INJECTION INTRAVENOUS; SUBCUTANEOUS at 21:30

## 2021-12-19 RX ADMIN — INSULIN HUMAN 20 UNITS: 100 INJECTION, SOLUTION PARENTERAL at 14:08

## 2021-12-19 RX ADMIN — CEFAZOLIN SODIUM 1000 MG: 1 INJECTION, POWDER, FOR SOLUTION INTRAMUSCULAR; INTRAVENOUS at 12:03

## 2021-12-19 RX ADMIN — FUROSEMIDE 80 MG: 10 INJECTION, SOLUTION INTRAMUSCULAR; INTRAVENOUS at 17:35

## 2021-12-19 RX ADMIN — FUROSEMIDE 40 MG: 10 INJECTION, SOLUTION INTRAMUSCULAR; INTRAVENOUS at 11:17

## 2021-12-19 RX ADMIN — PREGABALIN 200 MG: 100 CAPSULE ORAL at 21:30

## 2021-12-19 RX ADMIN — INSULIN LISPRO 4 UNITS: 100 INJECTION, SOLUTION INTRAVENOUS; SUBCUTANEOUS at 17:36

## 2021-12-19 RX ADMIN — QUETIAPINE FUMARATE 25 MG: 25 TABLET ORAL at 21:30

## 2021-12-19 RX ADMIN — INSULIN GLARGINE 20 UNITS: 100 INJECTION, SOLUTION SUBCUTANEOUS at 08:41

## 2021-12-19 ASSESSMENT — PAIN SCALES - GENERAL
PAINLEVEL_OUTOF10: 0

## 2021-12-19 NOTE — PROGRESS NOTES
Progress Note  Admit Date: 12/16/2021       CC: No chief complaint on file. Overnight Events: No acute overnight events. Pt seen and examined at bedside. Feeling fatigued, otherwise no dyspnea, chest pain, palpitations, N/V. Interval History: Refer to H&P    Review of Systems  General appearance: alert, appears stated age and cooperative. +fatigue  Skin: Skin color, texture, normal. No rashes or lesions  HEENT: No nose bleed, headache, vision problems  CV: No chest pain, tightness, pressure,   Respiratory: No c/o SOB, ALANIZ, Orthopnea, PND  GI: No abdominal pain, black stool, bloating  Limbs: No c/o edema, pain, intermittent claudication, joint pains. +LE edema  Neuro: No dizziness, lightheadedness, syncope, gait problems, memory problems  Psych: grossly normal. No SI/depression.     Scheduled Medications:    insulin regular  20 Units IntraVENous Once    furosemide  40 mg IntraVENous BID    amLODIPine  5 mg Oral Daily    insulin lispro  0-6 Units SubCUTAneous TID WC    insulin lispro  0-3 Units SubCUTAneous Nightly    pregabalin  200 mg Oral BID    heparin (porcine)  5,000 Units SubCUTAneous 3 times per day    sodium chloride flush  5-40 mL IntraVENous 2 times per day    QUEtiapine  25 mg Oral Nightly    FLUoxetine  20 mg Oral Daily    insulin glargine  20 Units SubCUTAneous Daily    ceFAZolin  1,000 mg IntraVENous Q24H      PRN Medications: acetaminophen **OR** acetaminophen, sodium chloride, sodium chloride flush, glucose, dextrose, glucagon (rDNA), dextrose, hydrALAZINE  Diet: Diet NPO    Continuous Infusions:   sodium chloride      dextrose         PHYSICAL EXAM:  BP (!) 187/103   Pulse 92   Temp 98.1 °F (36.7 °C) (Oral)   Resp 15   Wt 147 lb 0.8 oz (66.7 kg)   SpO2 96%   BMI 26.05 kg/m²   Recent Labs     12/18/21  1115 12/18/21  1656 12/18/21  1931 12/19/21  0738 12/19/21  0944   POCGLU 262* 318* 212* 549* 533*       Intake/Output Summary (Last 24 hours) at 12/19/2021 1107  Last data filed at 12/19/2021 1022  Gross per 24 hour   Intake 1850 ml   Output 4885 ml   Net -3035 ml       General appearance: No apparent distress, appears stated age and cooperative. HEENT: Pupils equal, round, and reactive to light. Conjunctivae/corneas clear. Neck: Supple, with full range of motion. No jugular venous distention. Trachea midline. Respiratory:  Normal respiratory effort. Clear to auscultation, bilaterally without Rales/Wheezes/Rhonchi. Cardiovascular: Regular rate and rhythm with normal S1/S2 without murmurs, rubs or gallops. Abdomen: Soft, non-tender, non-distended with normal bowel sounds. Musculoskeletal: No clubbing, cyanosis or edema bilaterally. Full range of motion without deformity. Skin: Skin color, texture, turgor normal.  No rashes or lesions. Neurologic:  Neurovascularly intact without any focal sensory/motor deficits. Cranial nerves: II-XII intact, grossly non-focal.  Psychiatric: Alert and oriented, thought content appropriate, normal insight    LABS:  Recent Labs     12/17/21 0440 12/18/21 0453 12/19/21 0444   WBC 5.8 5.5 6.6   HGB 8.1* 8.2* 7.7*   HCT 24.0* 24.8* 23.6*    433 387                                                                    Recent Labs     12/17/21 0440 12/18/21 0453 12/19/21 0444    137 135*   K 3.9 4.1 4.9    103 102   CO2 15* 18* 16*   BUN 80* 90* 96*   CREATININE 4.8* 5.1* 5.1*   GLUCOSE 193* 250* 513*     Recent Labs     12/17/21 0440 12/18/21 0453 12/19/21  0444   AST 20 18 25   ALT 32 17 12   BILITOT <0.2 <0.2 <0.2   ALKPHOS 74 70 75     No results for input(s): TROPONINI in the last 72 hours. No results for input(s): BNP in the last 72 hours. No results for input(s): CHOL, HDL in the last 72 hours. Invalid input(s): LDLCALCU  No results for input(s): INR in the last 72 hours.     Assessment & Plan:      MSSA endocarditis of aortic valve  - Cardiology consulted for possible DIANA  - continue Cefazolin    MONICA on CKD4 likely sec to vancomycin toxicity  - Cr 4.8 from baseline of around 3.0-3.5  - Proteinuria/ hypoalbuminuria/edema- may well have a component of nephrotic syndrome  -can benefit from measuring urinary protein excretion if ok with nephrology        Impending DKA- MGA, hyperglycemia,low HCO3-,Ketonuria   - Bicarb at 16, AG17  - Will need Insulin gtt if metabolic derangement isnt corrected. -Insulin 20U Humalog iv  -trend Accuchecks and BMP in 2-3hrs  -screen for Covid infection  -hold off the lantus  -transfer to ICU level of care    Hypertroponinemia sec to CKD and not NSTEMI  -No ischemic changes on EKG, probably not ACS  -no ischemic workup needed    Hyperkalemia, resolved    Features of CHF with anasarca  Swollen LUE- will need to r/o DVT  -Do doppler US LUE  Lasix 40mg iv  bid    The patient and / or the family were informed of the results of any tests, a time was given to answer questions, a plan was proposed and they agreed with plan.     DVT Prophylaxis: Heparin  Diet: Diet NPO  Code Status: Full Code    Disposition: Inpatient pending clinical improvement      Mani Dimas MD   Internal Medicine  12/19/2021 11:07 AM  Reach via [employer]

## 2021-12-19 NOTE — PROGRESS NOTES
[facility]                                               Progress note    Summary:   Jameel Daly is being seen by nephrology for MONICA on CKD stage 4. Admitted with endocarditis and DKA. Has known type 1 DM, complicated by CKD stage 4. Only recently moved here form [facility] ([place] Lives closer to [place] with his cousin now. He was transferred from OSH to here. Interval History  Seen at bedside. SOB improved but still has significant anasarca. On lasix IV 40 mg BID  Made 3000 mL urine yesterday, weight still increased  Cr stable at 5.1  BP still slightly elevated. Plan:   - increase lasix to 80 mg BID  - check 24 hour urine protein, albumin, and creatinine  - daily standing weights. Juan A Raines MD  [facility]  Office: [phone]      Assessment:  Acute kidney injury  Multifactorial MONICA in the setting of endocarditis, antibiotics including vancomycin  Creatinine peaked at 4.8  Baseline creatinine 3.2  Has Farris's not obstructed. random Vanco level of 24.6  Is significant CKD so low reserve for renal injury  He was hyperglycemic so may become hypovolemic  Clinically he has a lot of edema and profound hypoalbuminemia  Nephrotic range proteinuria in the setting of known type 1 diabetes  Baseline creatinine reported to be around 3.2, he was stage IV CKD already  Protein creatinine ratio 7.8 g urinalysis showed no blood 5-9 WBCs no casts, positive for glucose urea  Does have anemia but no thrombocytopenia to suggest TMA process. c3 and c4 normal.   DUTCH  ANCA  AntiGBM    Chronic kidney disease stage IV  Has diabetic nephropathy most likely in setting of longstanding proteinuria and uncontrolled diabetes  Was seeing a nephrologist in [place] and was told that he was stage IV  He has not been referred for transplant work-up or dialysis access as of now.   Was having issues with drug use    Metabolic acidosis  Likely secondary to renal failure  Was on a bicarb drip  Had mild anion gap with hyperglycemia which improved with an insulin drip. Lactate negative at 0.9  Urine tox was negative    Generalized edema  EF 65% mild concentric hypertrophy  Has nephrotic range proteinuria and hypoalbuminemia  He has significant pitting lower extremity edema. Chest x-ray shows right-sided pleural effusion with adjacent right basilar consolidation    MSSA bacterial endocarditis  There is an aortic valve vegetation suspected on echo from [facility]  He is history receiving vancomycin infusions  He is now on cefazolin  Cardiology has seen and there is no plan for DIANA at this time    Type 1 diabetes  Complicated by renal failure  Blood glucose 862 on admission with anion gap 17  pH 7.3 bicarb 13 on blood gas  Treated with an insulin drip and has improved. HPI:  Aubree De Jesus is being seen by nephrology for MONICA on CKD stage 4. This is a 28 yo man with past medical history significant for type 1 diabetes, CKD stage IV for which she was following with a nephrologist in [place]. He moved here about 3 weeks ago to live with his cousin because he became homeless when he was in [place]. He does not have doctors in town yet. He was initially taken to [facility] emergency room for altered mental status and agitation. He was found to be hyperglycemic and there was concern for bacterial endocarditis. Patient tells me that a couple years ago he had an issue with methamphetamine but no longer does this. He does smoke cigarettes, tobacco use currently. Denies IV drug use. He says that he has been having generalized edema for the past 2 months at least.  He was diagnosed with diabetes around 6years old has had diabetes for about 20 years. He denies hematuria but has seen very foamy frothy urine in the toilet. He does not use any NSAIDs. He was diagnosed with aortic valve endocarditis MSSA bacteria. He was post beginning IV vancomycin infusions. His baseline creatinine is around 3.2.   Currently he is awake and alert in no acute distress. He has significant lower extremity edema 2+ pitting. No rashes or lesions. No petechia. ROS:   Positives Listed Bold. All other remaining systems are negative. Constitutional:  fever, chills, weakness, weight change, fatigue,      Skin:  rash, pruritus, hair loss, bruising, dry skin, petechiae. Head, Face, Neck   headaches, swelling,  cervical adenopathy. Respiratory: shortness of breath, cough, or wheezing  Cardiovascular: chest pain, palpitations, dizzy, edema  Gastrointestinal: nausea, vomiting, diarrhea, constipation,belly pain    Yellow skin, blood in stool  Musculoskeletal:  back pain, muscle weakness, gait problems,       joint pain or swelling. Genitourinary:  dysuria, poor urine flow, flank pain, blood in urine  Neurologic:  vertigo, TIA'S, syncope, seizures, focal weakness  Psychosocial:  insomnia, anxiety, or depression. Additional positive findings: -     PMH:   Past medical history, surgical history, social history, family history are reviewed and updated as appropriate. Reviewed current medication list.   Allergies reviewed and updated as needed. PE:   Vitals:    12/19/21 1532   BP: (!) 150/83   Pulse: 95   Resp: 18   Temp:    SpO2: 95%       General appearance: white male in NAD, fully alert and oriented. Comfortable. HEENT: EOM intact, no icterus. Trachea is midline. Neck : No masses, appears symmetrical, no JVD  Respiratory: Respiratory effort appears normal, bilateral equal chest rise, no wheeze, no crackles   Cardiovascular: Ausculation shows RRR ++ edema  Abdomen: No visible mass or tenderness, non distended. Musculoskeletal:  Joints with no swelling or deformity. Skin:no rashes, ulcers, induration, no jaundice. Neuro: face symmetric, no focal deficits. Appropriate responses.        Lab Results   Component Value Date    CREATININE 5.1 ([place]) 12/19/2021    BUN 96 (HH) 12/19/2021     (L) 12/19/2021    K 4.9 12/19/2021  12/19/2021    CO2 16 (L) 12/19/2021      Lab Results   Component Value Date    WBC 6.6 12/19/2021    HGB 7.7 (L) 12/19/2021    HCT 23.6 (L) 12/19/2021    MCV 88.2 12/19/2021     12/19/2021     Lab Results   Component Value Date    CALCIUM 7.7 (L) 12/19/2021    PHOS 5.9 (H) 12/16/2021

## 2021-12-20 LAB
A/G RATIO: 0.7 (ref 1.1–2.2)
ALBUMIN SERPL-MCNC: 2 G/DL (ref 3.4–5)
ALP BLD-CCNC: 77 U/L (ref 40–129)
ALT SERPL-CCNC: 8 U/L (ref 10–40)
ANION GAP SERPL CALCULATED.3IONS-SCNC: 12 MMOL/L (ref 3–16)
ANION GAP SERPL CALCULATED.3IONS-SCNC: 16 MMOL/L (ref 3–16)
ANION GAP SERPL CALCULATED.3IONS-SCNC: 17 MMOL/L (ref 3–16)
AST SERPL-CCNC: 30 U/L (ref 15–37)
BASOPHILS ABSOLUTE: 0.1 K/UL (ref 0–0.2)
BASOPHILS RELATIVE PERCENT: 2.1 %
BILIRUB SERPL-MCNC: <0.2 MG/DL (ref 0–1)
BLOOD CULTURE, ROUTINE: NORMAL
BUN BLDV-MCNC: 104 MG/DL (ref 7–20)
BUN BLDV-MCNC: 111 MG/DL (ref 7–20)
BUN BLDV-MCNC: 97 MG/DL (ref 7–20)
CALCIUM SERPL-MCNC: 7.6 MG/DL (ref 8.3–10.6)
CALCIUM SERPL-MCNC: 7.9 MG/DL (ref 8.3–10.6)
CALCIUM SERPL-MCNC: 8.1 MG/DL (ref 8.3–10.6)
CHLORIDE BLD-SCNC: 102 MMOL/L (ref 99–110)
CHLORIDE BLD-SCNC: 102 MMOL/L (ref 99–110)
CHLORIDE BLD-SCNC: 106 MMOL/L (ref 99–110)
CO2: 16 MMOL/L (ref 21–32)
CO2: 18 MMOL/L (ref 21–32)
CO2: 18 MMOL/L (ref 21–32)
CREAT SERPL-MCNC: 4.9 MG/DL (ref 0.9–1.3)
CREAT SERPL-MCNC: 5.3 MG/DL (ref 0.9–1.3)
CREAT SERPL-MCNC: 5.3 MG/DL (ref 0.9–1.3)
CULTURE, BLOOD 2: NORMAL
EOSINOPHILS ABSOLUTE: 0.2 K/UL (ref 0–0.6)
EOSINOPHILS RELATIVE PERCENT: 2.9 %
GFR AFRICAN AMERICAN: 16
GFR AFRICAN AMERICAN: 16
GFR AFRICAN AMERICAN: 17
GFR NON-AFRICAN AMERICAN: 13
GFR NON-AFRICAN AMERICAN: 13
GFR NON-AFRICAN AMERICAN: 14
GLUCOSE BLD-MCNC: 139 MG/DL (ref 70–99)
GLUCOSE BLD-MCNC: 159 MG/DL (ref 70–99)
GLUCOSE BLD-MCNC: 172 MG/DL (ref 70–99)
GLUCOSE BLD-MCNC: 193 MG/DL (ref 70–99)
GLUCOSE BLD-MCNC: 212 MG/DL (ref 70–99)
GLUCOSE BLD-MCNC: 258 MG/DL (ref 70–99)
GLUCOSE BLD-MCNC: 272 MG/DL (ref 70–99)
GLUCOSE BLD-MCNC: 86 MG/DL (ref 70–99)
HCT VFR BLD CALC: 23.2 % (ref 40.5–52.5)
HEMOGLOBIN: 7.7 G/DL (ref 13.5–17.5)
LYMPHOCYTES ABSOLUTE: 1.4 K/UL (ref 1–5.1)
LYMPHOCYTES RELATIVE PERCENT: 20 %
MAGNESIUM: 2 MG/DL (ref 1.8–2.4)
MCH RBC QN AUTO: 28.9 PG (ref 26–34)
MCHC RBC AUTO-ENTMCNC: 33.3 G/DL (ref 31–36)
MCV RBC AUTO: 86.8 FL (ref 80–100)
MONOCYTES ABSOLUTE: 0.8 K/UL (ref 0–1.3)
MONOCYTES RELATIVE PERCENT: 10.8 %
NEUTROPHILS ABSOLUTE: 4.5 K/UL (ref 1.7–7.7)
NEUTROPHILS RELATIVE PERCENT: 64.2 %
PDW BLD-RTO: 16.3 % (ref 12.4–15.4)
PERFORMED ON: ABNORMAL
PERFORMED ON: NORMAL
PLATELET # BLD: 447 K/UL (ref 135–450)
PMV BLD AUTO: 9.3 FL (ref 5–10.5)
POTASSIUM REFLEX MAGNESIUM: 4.1 MMOL/L (ref 3.5–5.1)
POTASSIUM REFLEX MAGNESIUM: 4.3 MMOL/L (ref 3.5–5.1)
POTASSIUM REFLEX MAGNESIUM: 4.5 MMOL/L (ref 3.5–5.1)
RBC # BLD: 2.67 M/UL (ref 4.2–5.9)
SODIUM BLD-SCNC: 135 MMOL/L (ref 136–145)
SODIUM BLD-SCNC: 136 MMOL/L (ref 136–145)
SODIUM BLD-SCNC: 136 MMOL/L (ref 136–145)
TOTAL PROTEIN: 4.9 G/DL (ref 6.4–8.2)
WBC # BLD: 7 K/UL (ref 4–11)

## 2021-12-20 PROCEDURE — 82043 UR ALBUMIN QUANTITATIVE: CPT

## 2021-12-20 PROCEDURE — 6360000002 HC RX W HCPCS: Performed by: STUDENT IN AN ORGANIZED HEALTH CARE EDUCATION/TRAINING PROGRAM

## 2021-12-20 PROCEDURE — 83735 ASSAY OF MAGNESIUM: CPT

## 2021-12-20 PROCEDURE — 6370000000 HC RX 637 (ALT 250 FOR IP): Performed by: NURSE PRACTITIONER

## 2021-12-20 PROCEDURE — 80053 COMPREHEN METABOLIC PANEL: CPT

## 2021-12-20 PROCEDURE — 2580000003 HC RX 258: Performed by: INTERNAL MEDICINE

## 2021-12-20 PROCEDURE — 94760 N-INVAS EAR/PLS OXIMETRY 1: CPT

## 2021-12-20 PROCEDURE — 6370000000 HC RX 637 (ALT 250 FOR IP): Performed by: STUDENT IN AN ORGANIZED HEALTH CARE EDUCATION/TRAINING PROGRAM

## 2021-12-20 PROCEDURE — 36415 COLL VENOUS BLD VENIPUNCTURE: CPT

## 2021-12-20 PROCEDURE — 85025 COMPLETE CBC W/AUTO DIFF WBC: CPT

## 2021-12-20 PROCEDURE — 6370000000 HC RX 637 (ALT 250 FOR IP): Performed by: INTERNAL MEDICINE

## 2021-12-20 PROCEDURE — 6360000002 HC RX W HCPCS: Performed by: INTERNAL MEDICINE

## 2021-12-20 PROCEDURE — 2060000000 HC ICU INTERMEDIATE R&B

## 2021-12-20 RX ORDER — AMLODIPINE BESYLATE 5 MG/1
5 TABLET ORAL ONCE
Status: COMPLETED | OUTPATIENT
Start: 2021-12-20 | End: 2021-12-20

## 2021-12-20 RX ORDER — AMLODIPINE BESYLATE 10 MG/1
10 TABLET ORAL DAILY
Status: DISCONTINUED | OUTPATIENT
Start: 2021-12-21 | End: 2021-12-23 | Stop reason: HOSPADM

## 2021-12-20 RX ORDER — SODIUM BICARBONATE 650 MG/1
1300 TABLET ORAL 3 TIMES DAILY
Status: DISCONTINUED | OUTPATIENT
Start: 2021-12-20 | End: 2021-12-23 | Stop reason: HOSPADM

## 2021-12-20 RX ORDER — HYDRALAZINE HYDROCHLORIDE 20 MG/ML
5 INJECTION INTRAMUSCULAR; INTRAVENOUS EVERY 6 HOURS PRN
Status: DISCONTINUED | OUTPATIENT
Start: 2021-12-20 | End: 2021-12-23 | Stop reason: HOSPADM

## 2021-12-20 RX ADMIN — INSULIN LISPRO 6 UNITS: 100 INJECTION, SOLUTION INTRAVENOUS; SUBCUTANEOUS at 12:43

## 2021-12-20 RX ADMIN — AMLODIPINE BESYLATE 5 MG: 5 TABLET ORAL at 12:42

## 2021-12-20 RX ADMIN — INSULIN LISPRO 5 UNITS: 100 INJECTION, SOLUTION INTRAVENOUS; SUBCUTANEOUS at 17:48

## 2021-12-20 RX ADMIN — HEPARIN SODIUM 5000 UNITS: 5000 INJECTION INTRAVENOUS; SUBCUTANEOUS at 05:37

## 2021-12-20 RX ADMIN — HEPARIN SODIUM 5000 UNITS: 5000 INJECTION INTRAVENOUS; SUBCUTANEOUS at 21:27

## 2021-12-20 RX ADMIN — SODIUM BICARBONATE 1300 MG: 650 TABLET ORAL at 21:28

## 2021-12-20 RX ADMIN — CEFAZOLIN SODIUM 1000 MG: 1 INJECTION, POWDER, FOR SOLUTION INTRAMUSCULAR; INTRAVENOUS at 12:49

## 2021-12-20 RX ADMIN — FUROSEMIDE 80 MG: 10 INJECTION, SOLUTION INTRAMUSCULAR; INTRAVENOUS at 09:20

## 2021-12-20 RX ADMIN — FUROSEMIDE 80 MG: 10 INJECTION, SOLUTION INTRAMUSCULAR; INTRAVENOUS at 17:48

## 2021-12-20 RX ADMIN — FLUOXETINE 20 MG: 20 CAPSULE ORAL at 09:20

## 2021-12-20 RX ADMIN — INSULIN LISPRO 5 UNITS: 100 INJECTION, SOLUTION INTRAVENOUS; SUBCUTANEOUS at 12:43

## 2021-12-20 RX ADMIN — SODIUM CHLORIDE, PRESERVATIVE FREE 10 ML: 5 INJECTION INTRAVENOUS at 21:28

## 2021-12-20 RX ADMIN — HEPARIN SODIUM 5000 UNITS: 5000 INJECTION INTRAVENOUS; SUBCUTANEOUS at 14:30

## 2021-12-20 RX ADMIN — AMLODIPINE BESYLATE 5 MG: 5 TABLET ORAL at 09:20

## 2021-12-20 RX ADMIN — PREGABALIN 200 MG: 100 CAPSULE ORAL at 21:28

## 2021-12-20 RX ADMIN — INSULIN LISPRO 2 UNITS: 100 INJECTION, SOLUTION INTRAVENOUS; SUBCUTANEOUS at 09:21

## 2021-12-20 RX ADMIN — QUETIAPINE FUMARATE 25 MG: 25 TABLET ORAL at 21:28

## 2021-12-20 RX ADMIN — INSULIN GLARGINE 20 UNITS: 100 INJECTION, SOLUTION SUBCUTANEOUS at 09:21

## 2021-12-20 RX ADMIN — SODIUM BICARBONATE 1300 MG: 650 TABLET ORAL at 17:48

## 2021-12-20 RX ADMIN — PREGABALIN 200 MG: 100 CAPSULE ORAL at 09:19

## 2021-12-20 RX ADMIN — SODIUM CHLORIDE, PRESERVATIVE FREE 10 ML: 5 INJECTION INTRAVENOUS at 09:20

## 2021-12-20 RX ADMIN — SODIUM CHLORIDE 25 ML: 9 INJECTION, SOLUTION INTRAVENOUS at 12:48

## 2021-12-20 ASSESSMENT — PAIN SCALES - GENERAL
PAINLEVEL_OUTOF10: 0

## 2021-12-20 NOTE — PLAN OF CARE
Problem: Pain:  Goal: Pain level will decrease  Description: Pain level will decrease  Outcome: Ongoing  Goal: Control of acute pain  Description: Control of acute pain  Outcome: Ongoing  Goal: Control of chronic pain  Description: Control of chronic pain  Outcome: Ongoing  Goal: Patient's pain/discomfort is manageable  Description: Patient's pain/discomfort is manageable  Outcome: Ongoing     Problem: Skin Integrity:  Goal: Will show no infection signs and symptoms  Description: Will show no infection signs and symptoms  Outcome: Ongoing  Goal: Absence of new skin breakdown  Description: Absence of new skin breakdown  Outcome: Ongoing     Problem: Falls - Risk of:  Goal: Will remain free from falls  Description: Will remain free from falls  Outcome: Ongoing  Goal: Absence of physical injury  Description: Absence of physical injury  Outcome: Ongoing     Problem: Infection:  Goal: Will remain free from infection  Description: Will remain free from infection  Outcome: Ongoing     Problem: Safety:  Goal: Free from accidental physical injury  Description: Free from accidental physical injury  Outcome: Ongoing  Goal: Free from intentional harm  Description: Free from intentional harm  Outcome: Ongoing     Problem: Daily Care:  Goal: Daily care needs are met  Description: Daily care needs are met  Outcome: Ongoing     Problem: Skin Integrity:  Goal: Skin integrity will stabilize  Description: Skin integrity will stabilize  Outcome: Ongoing     Problem: Discharge Planning:  Goal: Patients continuum of care needs are met  Description: Patients continuum of care needs are met  Outcome: Ongoing     Problem:  Activity:  Goal: Risk for activity intolerance will decrease  Description: Risk for activity intolerance will decrease  Outcome: Ongoing     Problem: Coping:  Goal: Ability to identify and develop effective coping behavior will improve  Description: Ability to identify and develop effective coping behavior will

## 2021-12-20 NOTE — PROGRESS NOTES
[facility]                                               Progress note    Summary:   Dorothea Calderon is being seen by nephrology for MONICA on CKD stage 4. Admitted with endocarditis and DKA. Has known type 1 DM, complicated by CKD stage 4. Only recently moved here form [facility] [place] Lives closer to [place] with his cousin now. He was transferred from OSH to here. Interval History  Seen at bedside. SOB resolved. Edema improving on lasix IV 80 mg BID  Made 4035 mL urine yesterday  Weight 146 lbs today form 147 lbs, but these are bed weights. Cr stable at 5.3  BP still slightly elevated. --> amlodipine increased to 10 mg daily. Plan:   - continue lasix to 80 mg BID  - 24 hour urine protein, albumin, and creatinine underway  - start sodium bicarb 1300 mg TID for acidosis  - can reduce lab checks to once daily. - daily standing weights. Bird Danielson MD  [facility]  Office: [phone]      Assessment:  Acute kidney injury  Multifactorial MONICA in the setting of endocarditis, antibiotics including vancomycin  Creatinine peaked at 4.8  Baseline creatinine 3.2  Has Farris's not obstructed. random Vanco level of 24.6  Is significant CKD so low reserve for renal injury  He was hyperglycemic so may become hypovolemic  Clinically he has a lot of edema and profound hypoalbuminemia  Nephrotic range proteinuria in the setting of known type 1 diabetes  Baseline creatinine reported to be around 3.2, he was stage IV CKD already  Protein creatinine ratio 7.8 g urinalysis showed no blood 5-9 WBCs no casts, positive for glucose urea  Does have anemia but no thrombocytopenia to suggest TMA process.   c3 and c4 normal.   DUTCH  ANCA  AntiGBM    Chronic kidney disease stage IV  Has diabetic nephropathy most likely in setting of longstanding proteinuria and uncontrolled diabetes  Was seeing a nephrologist in [place] and was told that he was stage IV  He has not been referred for transplant work-up or dialysis access as of now. Was having issues with drug use    Metabolic acidosis  Likely secondary to renal failure  Was on a bicarb drip  Had mild anion gap with hyperglycemia which improved with an insulin drip. Lactate negative at 0.9  Urine tox was negative    Generalized edema  EF 65% mild concentric hypertrophy  Has nephrotic range proteinuria and hypoalbuminemia  He has significant pitting lower extremity edema. Chest x-ray shows right-sided pleural effusion with adjacent right basilar consolidation    MSSA bacterial endocarditis  There is an aortic valve vegetation suspected on echo from [facility]  He is history receiving vancomycin infusions  He is now on cefazolin  Cardiology has seen and there is no plan for DIANA at this time    Type 1 diabetes  Complicated by renal failure  Blood glucose 862 on admission with anion gap 17  pH 7.3 bicarb 13 on blood gas  Treated with an insulin drip and has improved. HPI:  Aubree De Jesus is being seen by nephrology for MONICA on CKD stage 4. This is a 26 yo man with past medical history significant for type 1 diabetes, CKD stage IV for which she was following with a nephrologist in [place]. He moved here about 3 weeks ago to live with his cousin because he became homeless when he was in [place]. He does not have doctors in town yet. He was initially taken to [facility] emergency room for altered mental status and agitation. He was found to be hyperglycemic and there was concern for bacterial endocarditis. Patient tells me that a couple years ago he had an issue with methamphetamine but no longer does this. He does smoke cigarettes, tobacco use currently. Denies IV drug use. He says that he has been having generalized edema for the past 2 months at least.  He was diagnosed with diabetes around 6years old has had diabetes for about 20 years. He denies hematuria but has seen very foamy frothy urine in the toilet. He does not use any NSAIDs.   He was deficits. Appropriate responses.        Lab Results   Component Value Date    CREATININE 5.3 (HH) 12/20/2021     (HH) 12/20/2021     12/20/2021    K 4.3 12/20/2021     12/20/2021    CO2 18 (L) 12/20/2021      Lab Results   Component Value Date    WBC 7.0 12/20/2021    HGB 7.7 (L) 12/20/2021    HCT 23.2 (L) 12/20/2021    MCV 86.8 12/20/2021     12/20/2021     Lab Results   Component Value Date    CALCIUM 7.9 (L) 12/20/2021    PHOS 5.9 (H) 12/16/2021

## 2021-12-20 NOTE — PROGRESS NOTES
24 urine specimen transferred down to lab.  Electronically signed by Ame Marcelo RN on 12/20/2021 at 6:23 PM

## 2021-12-20 NOTE — PROGRESS NOTES
Physician Progress Note      PATIENT:               Juan Andrade  CSN #:                  [id number]  :                       1995  ADMIT DATE:       2021 2:32 AM  DISCH DATE:  RESPONDING  PROVIDER #:        Jose Miguel Roa MD          QUERY TEXT:    Pt admitted with MONICA on CKD 4. \"Volume overloaded\" documented by nephrology   consult on . Beginning with progress notes on  \"Features of CHF   with anasarca\" is documented. No hx CHF. BNP on arrival 4931. Receiving IV   Lasix. If possible, please document in progress notes and discharge summary   further specificity regarding the type and acuity of CHF:    The medical record reflects the following:  Risk Factors: CKD  Clinical Indicators: Per progress notes beginning : Features of CHF with   anasarca documented; BNP 4931 on arrival  Treatment: nephrology consult, IV Lasix    Thank you,  Flaco Armstrong RN, BSN< [email or web address]  Options provided:  -- Volume overload without CHF  -- Acute Systolic CHF/HFrEF  -- Acute Diastolic CHF/HFpEF  -- Acute Systolic and Diastolic CHF  -- Other - I will add my own diagnosis  -- Disagree - Not applicable / Not valid  -- Disagree - Clinically unable to determine / Unknown  -- Refer to Clinical Documentation Reviewer    PROVIDER RESPONSE TEXT:    This patient is in acute diastolic CHF/HFpEF.     Query created by: Steven Mondragon on 2021 10:29 AM      Electronically signed by:  Jose Miguel Roa MD 2021 11:26 AM

## 2021-12-20 NOTE — CARE COORDINATION
[facility]  Diabetes Education   Progress Note       NAME:  Elissa Perry [place] RECORD NUMBER:  [id number]  AGE: 27 y.o. GENDER: male  : 1995  TODAY'S DATE:  2021    Subjective   Reason for Diabetic Education Evaluation and Assessment: general diabetes support    Abram Rubin agrees to meet with me for diabetes support. His affect is flat. He reports [place] moving to [place] from [place]. Reports feeling best when his blood sugars are near 150. States his blood sugars are frequently elevated. Visit Type: evaluation      Miguel A Ribera is a 27 y.o. male referred by:     [x] Physician  [] Nursing  [] Chart Review   [] Other:     PAST MEDICAL HISTORY        Diagnosis Date    Diabetes ([address]     Foot drop, left foot     Kidney failure     STAGE 4    Neuropathy        PAST SURGICAL HISTORY    No past surgical history on file. FAMILY HISTORY    No family history on file.     SOCIAL HISTORY    Social History     Tobacco Use    Smoking status: Current Some Day Smoker     Types: Cigarettes    Smokeless tobacco: Never Used   Vaping Use    Vaping Use: Every day   Substance Use Topics    Alcohol use: Never    Drug use: Yes     Types: Marijuana (Weed)       ALLERGIES    No Known Allergies    MEDICATIONS     [START ON 2021] amLODIPine  10 mg Oral Daily    insulin lispro  5 Units SubCUTAneous TID WC    sodium bicarbonate  1,300 mg Oral TID    insulin lispro  0-12 Units SubCUTAneous TID WC    insulin lispro  0-6 Units SubCUTAneous Nightly    furosemide  80 mg IntraVENous BID    pregabalin  200 mg Oral BID    heparin (porcine)  5,000 Units SubCUTAneous 3 times per day    sodium chloride flush  5-40 mL IntraVENous 2 times per day    QUEtiapine  25 mg Oral Nightly    FLUoxetine  20 mg Oral Daily    insulin glargine  20 Units SubCUTAneous Daily    ceFAZolin  1,000 mg IntraVENous Q24H       Objective        Patient Active Problem List   Diagnosis Code    DKA, type 1, not at goal [facility]) E10.10    Acute infective endocarditis I33.0    MONICA (acute kidney injury) ([facility] [address] Y11.7    Metabolic acidosis L71.4    Peripheral edema R60.9    CKD (chronic kidney disease) N18.9    DKA (diabetic ketoacidosis) ([facility]) E11.10    Type 1 diabetes mellitus ([facility]) E10.9        BP (!) 168/85   Pulse 92   Temp 97.3 °F (36.3 °C) (Oral)   Resp 16   Wt 146 lb 6.2 oz (66.4 kg)   SpO2 93%   BMI 25.93 kg/m²     HgBA1c:    Lab Results   Component Value Date    LABA1C 12.1 12/17/2021       Recent Labs     12/19/21  1947 12/20/21  0227 12/20/21  0818 12/20/21  1156   POCGLU 96 193* 172* 258*       BUN/Creatinine:    Lab Results   Component Value Date     12/20/2021    CREATININE 5.3 12/20/2021       Assessment        Diabetes Management and Education    Does the patient have a Primary Care Physician? No     Does the patient require new medication instruction? TBD   Reviewed current insulin plan. Reviewed basal and prandial insulin concepts. Person responsible for administration of Insulin/Medication:        [x] Self     [] Caregiver       [] Spouse       [] Other Family Member   []  Other      Level of patient/caregiver understanding able to:       [] Verbalized Understanding   [] Demonstrated Understanding       [] Teach Back       [x] Needs Reinforcement     []  Other:        Does the patient/caregiver monitor Blood Glucoses? Yes - Requesting new meter  Reviewed glycemic control targets, testing frequency and when to call PCP. Level of patient/caregiver understanding able to:        [] Verbalized Understanding   [] Demonstrated Understanding       [] Teach Back       [x] Needs Reinforcement     []  Other:      Does the patient/caregiver follow a Meal Plan? No: Reports getting enough food at meals. Does the patient/caregiver understand S/S of Hypoglycemia? Yes. Reports symptoms when less than 70.     Reviewed symptoms, prevention and treatment. Level of patient/caregiver understanding able to:       [x] Verbalized Understanding   [] Demonstrated Understanding       [] Teach Back       [] Needs Reinforcement     [x]  Other:  Agrees to notify staff if he has any symptoms. Does the patient/caregiver understand S/S of Hyperglycemia? No:     Reviewed symptoms, prevention and treatment. Level of patient/caregiver understanding able to:        [] Verbalized Understanding   [] Demonstrated Understanding       [] Teach Back       [x] Needs Reinforcement     []  Other:           Plan        Ongoing diabetes education and blood glucose monitoring.                                            Discharge Plan:  Discharge needs: glucometer/strips/lancets       Teaching Time Diabetes Education:  20 minutes     Electronically signed by Pedro Snow on 12/20/2021 at 4:17 PM

## 2021-12-20 NOTE — PROGRESS NOTES
Progress Note  Admit Date: 12/16/2021       CC: No chief complaint on file. Overnight Events: No acute overnight events. Pt seen and examined at bedside. Feeling fatigued, otherwise no dyspnea, chest pain, palpitations, N/V. Interval History: Refer to H&P    Review of Systems  General appearance: alert, appears stated age and cooperative. +fatigue  Skin: Skin color, texture, normal. No rashes or lesions  HEENT: No nose bleed, headache, vision problems  CV: No chest pain, tightness, pressure,   Respiratory: No c/o SOB, ALANIZ, Orthopnea, PND  GI: No abdominal pain, black stool, bloating  Limbs: No c/o edema, pain, intermittent claudication, joint pains. +LE edema  Neuro: No dizziness, lightheadedness, syncope, gait problems, memory problems  Psych: grossly normal. No SI/depression. Scheduled Medications:    insulin lispro  0-12 Units SubCUTAneous TID WC    insulin lispro  0-6 Units SubCUTAneous Nightly    furosemide  80 mg IntraVENous BID    amLODIPine  5 mg Oral Daily    pregabalin  200 mg Oral BID    heparin (porcine)  5,000 Units SubCUTAneous 3 times per day    sodium chloride flush  5-40 mL IntraVENous 2 times per day    QUEtiapine  25 mg Oral Nightly    FLUoxetine  20 mg Oral Daily    insulin glargine  20 Units SubCUTAneous Daily    ceFAZolin  1,000 mg IntraVENous Q24H      PRN Medications: acetaminophen **OR** acetaminophen, sodium chloride, sodium chloride flush, glucose, dextrose, glucagon (rDNA), dextrose, hydrALAZINE  Diet: ADULT DIET;  Regular; 3 carb choices (45 gm/meal)    Continuous Infusions:   sodium chloride      dextrose         PHYSICAL EXAM:  BP (!) 160/90   Pulse 82   Temp 98.2 °F (36.8 °C) (Oral)   Resp 16   Wt 146 lb 6.2 oz (66.4 kg)   SpO2 96%   BMI 25.93 kg/m²   Recent Labs     12/19/21  1508 12/19/21  1655 12/19/21  1947 12/20/21  0227 12/20/21  0818   POCGLU 279* 221* 96 193* 172*       Intake/Output Summary (Last 24 hours) at 12/20/2021 1126  Last data filed at 12/20/2021 0920  Gross per 24 hour   Intake 490 ml   Output 2150 ml   Net -1660 ml       General appearance: No apparent distress, appears stated age and cooperative. HEENT: Pupils equal, round, and reactive to light. Conjunctivae/corneas clear. Neck: Supple, with full range of motion. No jugular venous distention. Trachea midline. Respiratory:  Normal respiratory effort. Clear to auscultation, bilaterally without Rales/Wheezes/Rhonchi. Cardiovascular: Regular rate and rhythm with normal S1/S2 without murmurs, rubs or gallops. Abdomen: Soft, non-tender, non-distended with normal bowel sounds. Musculoskeletal: No clubbing, cyanosis or edema bilaterally. Full range of motion without deformity. Skin: Skin color, texture, turgor normal.  No rashes or lesions. Neurologic:  Neurovascularly intact without any focal sensory/motor deficits. Cranial nerves: II-XII intact, grossly non-focal.  Psychiatric: Alert and oriented, thought content appropriate, normal insight    LABS:  Recent Labs     12/18/21 0453 12/19/21 0444 12/20/21  0334   WBC 5.5 6.6 7.0   HGB 8.2* 7.7* 7.7*   HCT 24.8* 23.6* 23.2*    387 447                                                                    Recent Labs     12/19/21  2321 12/20/21  0334 12/20/21  0745   * 135* 136   K 4.2 4.5 4.1    102 106   CO2 18* 16* 18*   BUN 99* 97* 111*   CREATININE 5.4* 5.3* 4.9*   GLUCOSE 193* 212* 159*     Recent Labs     12/18/21 0453 12/19/21 0444 12/20/21  0334   AST 18 25 30   ALT 17 12 8*   BILITOT <0.2 <0.2 <0.2   ALKPHOS 70 75 77     No results for input(s): TROPONINI in the last 72 hours. No results for input(s): BNP in the last 72 hours. No results for input(s): CHOL, HDL in the last 72 hours. Invalid input(s): LDLCALCU  No results for input(s): INR in the last 72 hours.     Assessment & Plan:      MSSA endocarditis of aortic valve  - Cardiology consulted for possible DIANA  - continue Cefazolin    MONICA on CKD4 likely sec to vancomycin toxicity  - Cr 4.9 from baseline of around 3.0-3.5  - Proteinuria/ hypoalbuminuria/edema- may well have a component of nephrotic syndrome  -can benefit from measuring urinary protein excretion if ok with nephrology        Impending DKA-resolved. MGA, hyperglycemia,low HCO3-,Ketonuria-resolved. Covid 19 test negative. AG12  -continue glargine at Insulin 20U qhs  -start 10U Humalog AC. -Accuchecks AC +qhs  -diabetic diet    Hypertroponinemia sec to CKD and not NSTEMI  -No ischemic changes on EKG, probably not ACS  -no ischemic workup needed    Hyperkalemia, resolved    Acute diastolic CHF EF 02% with anasarca sec to valvular heart diseaase  Swollen LUE- will need to r/o DVT  -Do doppler US LUE  -Lasix 40mg iv  bid    The patient and / or the family were informed of the results of any tests, a time was given to answer questions, a plan was proposed and they agreed with plan. DVT Prophylaxis: Heparin  Diet: ADULT DIET;  Regular; 3 carb choices (45 gm/meal)  Code Status: Full Code    Disposition: Inpatient pending clinical improvement      Abraham Vo MD   Internal Medicine  12/20/2021 11:26 AM  [employer] via [employer]

## 2021-12-20 NOTE — PLAN OF CARE
Problem: Pain:  Goal: Pain level will decrease  Description: Pain level will decrease  12/20/2021 1408 by Drake Elena RN  Outcome: Ongoing  12/20/2021 0133 by Prerna Mckinney RN  Outcome: Ongoing  Goal: Control of acute pain  Description: Control of acute pain  12/20/2021 1408 by Drake Elena RN  Outcome: Ongoing  12/20/2021 0133 by Prerna Mckinney RN  Outcome: Ongoing  Goal: Control of chronic pain  Description: Control of chronic pain  12/20/2021 1408 by Drake Elena RN  Outcome: Ongoing  12/20/2021 0133 by Prerna Mckinney RN  Outcome: Ongoing  Goal: Patient's pain/discomfort is manageable  Description: Patient's pain/discomfort is manageable  12/20/2021 1408 by Drake Elena RN  Outcome: Ongoing  12/20/2021 0133 by Prerna Mckinney RN  Outcome: Ongoing     Problem: Skin Integrity:  Goal: Will show no infection signs and symptoms  Description: Will show no infection signs and symptoms  12/20/2021 1408 by Drake Elena RN  Outcome: Ongoing  12/20/2021 0133 by Prerna Mckinney RN  Outcome: Ongoing  Goal: Absence of new skin breakdown  Description: Absence of new skin breakdown  12/20/2021 1408 by Drake Elena RN  Outcome: Ongoing  12/20/2021 0133 by Prerna Mckinney RN  Outcome: Ongoing     Problem: Falls - Risk of:  Goal: Will remain free from falls  Description: Will remain free from falls  12/20/2021 1408 by Drake Elena RN  Outcome: Ongoing  12/20/2021 0133 by Prerna Mckinney RN  Outcome: Ongoing  Goal: Absence of physical injury  Description: Absence of physical injury  12/20/2021 1408 by Drake Elena RN  Outcome: Ongoing  12/20/2021 0133 by Prerna Mckinney RN  Outcome: Ongoing     Problem: Infection:  Goal: Will remain free from infection  Description: Will remain free from infection  12/20/2021 1408 by Drake Elena RN  Outcome: Ongoing  12/20/2021 0133 by Prerna Mckinney RN  Outcome: Ongoing     Problem: Safety:  Goal: Free from accidental physical injury  Description: Free from accidental physical injury  12/20/2021 1408 by Alejandro Charles RN  Outcome: Ongoing  12/20/2021 0133 by Mariana Wolfe RN  Outcome: Ongoing  Goal: Free from intentional harm  Description: Free from intentional harm  12/20/2021 1408 by Alejandro Charles RN  Outcome: Ongoing  12/20/2021 0133 by Mariana Wolfe RN  Outcome: Ongoing     Problem: Daily Care:  Goal: Daily care needs are met  Description: Daily care needs are met  12/20/2021 1408 by Alejandro Charles RN  Outcome: Ongoing  12/20/2021 0133 by Mariana Wolfe RN  Outcome: Ongoing     Problem: Skin Integrity:  Goal: Skin integrity will stabilize  Description: Skin integrity will stabilize  12/20/2021 1408 by Alejandro Charles RN  Outcome: Ongoing  12/20/2021 0133 by Mariana Wolfe RN  Outcome: Ongoing     Problem: Discharge Planning:  Goal: Patients continuum of care needs are met  Description: Patients continuum of care needs are met  12/20/2021 1408 by Alejandro Charles RN  Outcome: Ongoing  12/20/2021 0133 by Mariana Wolfe RN  Outcome: Ongoing     Problem:  Activity:  Goal: Risk for activity intolerance will decrease  Description: Risk for activity intolerance will decrease  12/20/2021 1408 by Alejandro hCarles RN  Outcome: Ongoing  12/20/2021 0133 by Mariana Wolfe RN  Outcome: Ongoing     Problem: Coping:  Goal: Ability to identify and develop effective coping behavior will improve  Description: Ability to identify and develop effective coping behavior will improve  12/20/2021 1408 by Alejandro Charles RN  Outcome: Ongoing  12/20/2021 0133 by Mariana Wolfe RN  Outcome: Ongoing  Goal: Expressions of feelings of enhanced comfort will increase  Description: Expressions of feelings of enhanced comfort will increase  Outcome: Ongoing     Problem: Fluid Volume:  Goal: Will show no signs or symptoms of fluid imbalance  Description: Will show no signs or symptoms of fluid imbalance  12/20/2021 1408 by Alejandro Charles RN  Outcome: Ongoing  12/20/2021 0133 by Mariana Wolfe RN  Outcome: Ongoing  Goal: Ability to achieve a balanced intake and output will improve  Description: Ability to achieve a balanced intake and output will improve  Outcome: Ongoing     Problem: Health Behavior:  Goal: Ability to manage health-related needs will improve  Description: Ability to manage health-related needs will improve  12/20/2021 1408 by Vanice Goldmann, RN  Outcome: Ongoing  12/20/2021 0133 by Cari Lion RN  Outcome: Ongoing     Problem: Nutritional:  Goal: Maintenance of adequate nutrition will be supported  Description: Maintenance of adequate nutrition will be supported  12/20/2021 1408 by Vanice Goldmann, RN  Outcome: Ongoing  12/20/2021 0133 by Cari Lion RN  Outcome: Ongoing     Problem: Physical Regulation:  Goal: Complications related to the disease process, condition or treatment will be avoided or minimized  Description: Complications related to the disease process, condition or treatment will be avoided or minimized  12/20/2021 1408 by Vanice Goldmann, RN  Outcome: Ongoing  12/20/2021 0133 by Cari Lion RN  Outcome: Ongoing  Goal: Ability to maintain clinical measurements within normal limits will improve  Description: Ability to maintain clinical measurements within normal limits will improve  Outcome: Ongoing  Goal: Will show no signs and symptoms of electrolyte imbalance  Description: Will show no signs and symptoms of electrolyte imbalance  Outcome: Ongoing     Problem: Urinary Elimination:  Goal: Will remain free from infection  Description: Will remain free from infection  12/20/2021 1408 by Vanice Goldmann, RN  Outcome: Ongoing  12/20/2021 0133 by Cari Lion RN  Outcome: Ongoing  Goal: Ability to achieve a balanced intake and output will improve  Description: Ability to achieve a balanced intake and output will improve  Outcome: Ongoing  Goal: Ability to achieve and maintain adequate urine output will be supported  Description: Ability to achieve and maintain adequate urine output will be supported  12/20/2021 1408 by Shelly Lemos RN  Outcome: Ongoing  12/20/2021 0133 by Kita Babinski, RN  Outcome: Ongoing  Goal: Ability to recognize the need to void and respond appropriately will improve  Description: Ability to recognize the need to void and respond appropriately will improve  Outcome: Ongoing     Problem: Serum Glucose Level - Abnormal:  Goal: Ability to maintain appropriate glucose levels has stabilized  Description: Ability to maintain appropriate glucose levels has stabilized  Outcome: Ongoing

## 2021-12-20 NOTE — CARE COORDINATION
Met with patient at his request. States he was going to [facility] Infusion center prior to admission for IV antibiotic therapy and wants to go there again at discharge. Assured patient if needed will notify the infusion center when discharging.      Livier MARK RN  Case Management  [id number]    Electronically signed by Livier Poon RN on 12/20/2021 at 4:30 PM

## 2021-12-20 NOTE — PROGRESS NOTES
Farris cath removed at this time per nephrology MD order. Patient educated about the need for further UO monitoring.  Electronically signed by Radha Oneil RN on 12/20/2021 at 6:24 PM Electronically signed by Radha Oneil RN on 12/20/2021 at 6:24 PM

## 2021-12-21 PROBLEM — N18.9 ACUTE KIDNEY INJURY SUPERIMPOSED ON CHRONIC KIDNEY DISEASE (HCC): Status: ACTIVE | Noted: 2021-12-15

## 2021-12-21 LAB
A/G RATIO: 0.7 (ref 1.1–2.2)
ALBUMIN SERPL-MCNC: 2.1 G/DL (ref 3.4–5)
ALP BLD-CCNC: 71 U/L (ref 40–129)
ALT SERPL-CCNC: 13 U/L (ref 10–40)
ANION GAP SERPL CALCULATED.3IONS-SCNC: 15 MMOL/L (ref 3–16)
AST SERPL-CCNC: 50 U/L (ref 15–37)
BASOPHILS ABSOLUTE: 0.2 K/UL (ref 0–0.2)
BASOPHILS RELATIVE PERCENT: 2.2 %
BETA GLOBULIN: 0 AU/ML (ref 0–19)
BILIRUB SERPL-MCNC: <0.2 MG/DL (ref 0–1)
BLOOD CULTURE, ROUTINE: NORMAL
BLOOD CULTURE, ROUTINE: NORMAL
BUN BLDV-MCNC: 115 MG/DL (ref 7–20)
CALCIUM SERPL-MCNC: 7.8 MG/DL (ref 8.3–10.6)
CHLORIDE BLD-SCNC: 102 MMOL/L (ref 99–110)
CO2: 17 MMOL/L (ref 21–32)
CREAT SERPL-MCNC: 5.7 MG/DL (ref 0.9–1.3)
EOSINOPHILS ABSOLUTE: 0.3 K/UL (ref 0–0.6)
EOSINOPHILS RELATIVE PERCENT: 3.6 %
GFR AFRICAN AMERICAN: 14
GFR NON-AFRICAN AMERICAN: 12
GLUCOSE BLD-MCNC: 119 MG/DL (ref 70–99)
GLUCOSE BLD-MCNC: 217 MG/DL (ref 70–99)
GLUCOSE BLD-MCNC: 255 MG/DL (ref 70–99)
GLUCOSE BLD-MCNC: 275 MG/DL (ref 70–99)
GLUCOSE BLD-MCNC: 350 MG/DL (ref 70–99)
GLUCOSE BLD-MCNC: 382 MG/DL (ref 70–99)
HCT VFR BLD CALC: 22.7 % (ref 40.5–52.5)
HEMOGLOBIN: 7.6 G/DL (ref 13.5–17.5)
LYMPHOCYTES ABSOLUTE: 1 K/UL (ref 1–5.1)
LYMPHOCYTES RELATIVE PERCENT: 13.9 %
MAGNESIUM: 2.1 MG/DL (ref 1.8–2.4)
MCH RBC QN AUTO: 29.1 PG (ref 26–34)
MCHC RBC AUTO-ENTMCNC: 33.3 G/DL (ref 31–36)
MCV RBC AUTO: 87.4 FL (ref 80–100)
MONOCYTES ABSOLUTE: 0.7 K/UL (ref 0–1.3)
MONOCYTES RELATIVE PERCENT: 10 %
NEUTROPHILS ABSOLUTE: 5 K/UL (ref 1.7–7.7)
NEUTROPHILS RELATIVE PERCENT: 70.3 %
PDW BLD-RTO: 16.5 % (ref 12.4–15.4)
PERFORMED ON: ABNORMAL
PLATELET # BLD: 427 K/UL (ref 135–450)
PMV BLD AUTO: 9 FL (ref 5–10.5)
POTASSIUM SERPL-SCNC: 4.7 MMOL/L (ref 3.5–5.1)
RBC # BLD: 2.6 M/UL (ref 4.2–5.9)
SODIUM BLD-SCNC: 134 MMOL/L (ref 136–145)
TOTAL PROTEIN: 5.1 G/DL (ref 6.4–8.2)
WBC # BLD: 7.1 K/UL (ref 4–11)

## 2021-12-21 PROCEDURE — 6360000002 HC RX W HCPCS: Performed by: STUDENT IN AN ORGANIZED HEALTH CARE EDUCATION/TRAINING PROGRAM

## 2021-12-21 PROCEDURE — 6370000000 HC RX 637 (ALT 250 FOR IP): Performed by: STUDENT IN AN ORGANIZED HEALTH CARE EDUCATION/TRAINING PROGRAM

## 2021-12-21 PROCEDURE — 6370000000 HC RX 637 (ALT 250 FOR IP): Performed by: INTERNAL MEDICINE

## 2021-12-21 PROCEDURE — 99233 SBSQ HOSP IP/OBS HIGH 50: CPT | Performed by: INTERNAL MEDICINE

## 2021-12-21 PROCEDURE — 2580000003 HC RX 258: Performed by: INTERNAL MEDICINE

## 2021-12-21 PROCEDURE — 36415 COLL VENOUS BLD VENIPUNCTURE: CPT

## 2021-12-21 PROCEDURE — 85025 COMPLETE CBC W/AUTO DIFF WBC: CPT

## 2021-12-21 PROCEDURE — 6370000000 HC RX 637 (ALT 250 FOR IP): Performed by: NURSE PRACTITIONER

## 2021-12-21 PROCEDURE — 80053 COMPREHEN METABOLIC PANEL: CPT

## 2021-12-21 PROCEDURE — 6360000002 HC RX W HCPCS: Performed by: INTERNAL MEDICINE

## 2021-12-21 PROCEDURE — 83735 ASSAY OF MAGNESIUM: CPT

## 2021-12-21 PROCEDURE — 2060000000 HC ICU INTERMEDIATE R&B

## 2021-12-21 PROCEDURE — 94760 N-INVAS EAR/PLS OXIMETRY 1: CPT

## 2021-12-21 RX ORDER — METOLAZONE 5 MG/1
5 TABLET ORAL DAILY
Status: DISCONTINUED | OUTPATIENT
Start: 2021-12-21 | End: 2021-12-22

## 2021-12-21 RX ADMIN — INSULIN LISPRO 8 UNITS: 100 INJECTION, SOLUTION INTRAVENOUS; SUBCUTANEOUS at 17:47

## 2021-12-21 RX ADMIN — INSULIN LISPRO 4 UNITS: 100 INJECTION, SOLUTION INTRAVENOUS; SUBCUTANEOUS at 17:47

## 2021-12-21 RX ADMIN — FUROSEMIDE 80 MG: 10 INJECTION, SOLUTION INTRAMUSCULAR; INTRAVENOUS at 17:47

## 2021-12-21 RX ADMIN — INSULIN GLARGINE 20 UNITS: 100 INJECTION, SOLUTION SUBCUTANEOUS at 08:04

## 2021-12-21 RX ADMIN — HEPARIN SODIUM 5000 UNITS: 5000 INJECTION INTRAVENOUS; SUBCUTANEOUS at 06:24

## 2021-12-21 RX ADMIN — INSULIN LISPRO 10 UNITS: 100 INJECTION, SOLUTION INTRAVENOUS; SUBCUTANEOUS at 08:04

## 2021-12-21 RX ADMIN — CEFAZOLIN SODIUM 1000 MG: 1 INJECTION, POWDER, FOR SOLUTION INTRAMUSCULAR; INTRAVENOUS at 12:22

## 2021-12-21 RX ADMIN — SODIUM BICARBONATE 1300 MG: 650 TABLET ORAL at 21:20

## 2021-12-21 RX ADMIN — SODIUM CHLORIDE, PRESERVATIVE FREE 10 ML: 5 INJECTION INTRAVENOUS at 08:04

## 2021-12-21 RX ADMIN — INSULIN LISPRO 10 UNITS: 100 INJECTION, SOLUTION INTRAVENOUS; SUBCUTANEOUS at 12:22

## 2021-12-21 RX ADMIN — SODIUM BICARBONATE 1300 MG: 650 TABLET ORAL at 12:52

## 2021-12-21 RX ADMIN — FLUOXETINE 20 MG: 20 CAPSULE ORAL at 08:03

## 2021-12-21 RX ADMIN — SODIUM CHLORIDE, PRESERVATIVE FREE 10 ML: 5 INJECTION INTRAVENOUS at 21:21

## 2021-12-21 RX ADMIN — AMLODIPINE BESYLATE 10 MG: 10 TABLET ORAL at 08:03

## 2021-12-21 RX ADMIN — PREGABALIN 200 MG: 100 CAPSULE ORAL at 21:20

## 2021-12-21 RX ADMIN — INSULIN LISPRO 5 UNITS: 100 INJECTION, SOLUTION INTRAVENOUS; SUBCUTANEOUS at 08:05

## 2021-12-21 RX ADMIN — FUROSEMIDE 80 MG: 10 INJECTION, SOLUTION INTRAMUSCULAR; INTRAVENOUS at 08:03

## 2021-12-21 RX ADMIN — HEPARIN SODIUM 5000 UNITS: 5000 INJECTION INTRAVENOUS; SUBCUTANEOUS at 12:52

## 2021-12-21 RX ADMIN — SODIUM BICARBONATE 1300 MG: 650 TABLET ORAL at 08:03

## 2021-12-21 RX ADMIN — PREGABALIN 200 MG: 100 CAPSULE ORAL at 08:03

## 2021-12-21 RX ADMIN — HEPARIN SODIUM 5000 UNITS: 5000 INJECTION INTRAVENOUS; SUBCUTANEOUS at 21:20

## 2021-12-21 RX ADMIN — METOLAZONE 5 MG: 5 TABLET ORAL at 17:47

## 2021-12-21 RX ADMIN — QUETIAPINE FUMARATE 25 MG: 25 TABLET ORAL at 21:20

## 2021-12-21 RX ADMIN — INSULIN LISPRO 5 UNITS: 100 INJECTION, SOLUTION INTRAVENOUS; SUBCUTANEOUS at 12:22

## 2021-12-21 ASSESSMENT — ENCOUNTER SYMPTOMS
WHEEZING: 0
RHINORRHEA: 0
SHORTNESS OF BREATH: 0
BACK PAIN: 0
ABDOMINAL PAIN: 0
EYE DISCHARGE: 0
CONSTIPATION: 0
NAUSEA: 0
TROUBLE SWALLOWING: 0
COUGH: 0
DIARRHEA: 0
SORE THROAT: 0
EYE REDNESS: 0

## 2021-12-21 ASSESSMENT — PAIN SCALES - GENERAL
PAINLEVEL_OUTOF10: 8
PAINLEVEL_OUTOF10: 0

## 2021-12-21 ASSESSMENT — PAIN DESCRIPTION - PAIN TYPE: TYPE: NEUROPATHIC PAIN

## 2021-12-21 ASSESSMENT — PAIN DESCRIPTION - FREQUENCY: FREQUENCY: CONTINUOUS

## 2021-12-21 ASSESSMENT — PAIN - FUNCTIONAL ASSESSMENT: PAIN_FUNCTIONAL_ASSESSMENT: ACTIVITIES ARE NOT PREVENTED

## 2021-12-21 ASSESSMENT — PAIN DESCRIPTION - DESCRIPTORS: DESCRIPTORS: ACHING;BURNING

## 2021-12-21 ASSESSMENT — PAIN DESCRIPTION - LOCATION: LOCATION: GENERALIZED

## 2021-12-21 ASSESSMENT — PAIN DESCRIPTION - ONSET: ONSET: ON-GOING

## 2021-12-21 ASSESSMENT — PAIN DESCRIPTION - PROGRESSION: CLINICAL_PROGRESSION: NOT CHANGED

## 2021-12-21 NOTE — CARE COORDINATION
[address]  Ph:  [address] [facility],[address]  Administrative Assist, Case Management  [phone]  Electronically signed by Rosezena Dancer on 12/21/2021 at 6:18 AM

## 2021-12-21 NOTE — DISCHARGE INSTR - COC
Continuity of Care Form    Patient Name: Briseyda Carranza   :  1995  MRN:  [id number]    Admit date:  2021  Discharge date:  2021    Code Status Order: Full Code   Advance Directives:      Admitting Physician:  Jaqui Tran MD  PCP: No primary care provider on file. Discharging Nurse: [facility] Unit/Room#: W7Q-4490/9690-20  Discharging Unit Phone Number: [phone]    Emergency Contact:   Extended Emergency Contact Information  Primary Emergency Contact: Aubrie Serna  Mobile Phone: [phone]  Relation: Other   needed? No  Secondary Emergency ContactWild Ramirez  Mobile Phone: [phone]  Relation: Parent   needed? No    Past Surgical History:  No past surgical history on file. Immunization History: There is no immunization history on file for this patient.     Active Problems:  Patient Active Problem List   Diagnosis Code    DKA, type 1, not at goal [facility]) E10.10    Acute infective endocarditis I33.0    MONICA (acute kidney injury) ([address] O43.4    Metabolic acidosis Y76.1    Peripheral edema R60.9    CKD (chronic kidney disease) N18.9    DKA (diabetic ketoacidosis) ([facility]) E11.10    Type 1 diabetes mellitus ([address] E10.9       Isolation/Infection:   Isolation            No Isolation          Patient Infection Status       None to display            Nurse Assessment:  Last Vital Signs: BP (!) 142/75   Pulse 90   Temp 98.1 °F (36.7 °C) (Oral)   Resp 16   Wt 147 lb 7.8 oz (66.9 kg)   SpO2 92%   BMI 26.13 kg/m²     Last documented pain score (0-10 scale): Pain Level: 8  Last Weight:   Wt Readings from Last 1 Encounters:   21 147 lb 7.8 oz (66.9 kg)     Mental Status:  oriented and alert    IV Access:  - Central Venous Catheter  - site  right and subclavian, insertion date: 21    Nursing Mobility/ADLs:  Walking   Independent  Transfer  [address] [facility] [address]  Independent  Med Delivery   whole    Wound Care Documentation and Therapy:        Elimination:  Continence: Bowel: Yes  Bladder: Yes  Urinary Catheter: None   Colostomy/Ileostomy/Ileal Conduit: No       Date of Last BM: 12/21/21    Intake/Output Summary (Last 24 hours) at 12/21/2021 1548  Last data filed at 12/21/2021 1252  Gross per 24 hour   Intake 1500 ml   Output 3500 ml   Net -2000 ml     I/O last 3 completed shifts: In: 1500 [P.O.:1500]  Out: 3500 [Urine:3500]    Safety Concerns: At Risk for Falls    Impairments/Disabilities:      None    Nutrition Therapy:  Current Nutrition Therapy:   - Oral Diet:  Carb Control 3 carbs/meal (1500kcals/day) and Low Sodium (2gm)    Routes of Feeding: Oral  Liquids: No Restrictions  Daily Fluid Restriction: yes - amount 1500ml  Last Modified Barium Swallow with Video (Video Swallowing Test): not done    Treatments at the Time of Hospital Discharge:   Respiratory Treatments: n/a  Oxygen Therapy:  is not on home oxygen therapy.   Ventilator:    - No ventilator support    Rehab Therapies: Physical Therapy and Occupational Therapy  Weight Bearing Status/Restrictions: No weight bearing restirctions  Other Medical Equipment (for information only, NOT a DME order):  n/a  Other Treatments: ***    Patient's personal belongings (please select all that are sent with patient):  None    RN SIGNATURE:  Electronically signed by Rin Guzman RN on 12/23/21 at 2:53 PM EST    CASE MANAGEMENT/SOCIAL WORK SECTION    Inpatient Status Date: 12-    Readmission Risk Assessment Score:  Readmission Risk              Risk of Unplanned Readmission:  22           Out Patient Infusion :  Name:    Madeline Pierre  Address:  Phone:  [phone]  Fax:       [phone]    D  / signature: Tamra Saavedra [facility]     Case Management   [phone]    12/23/2021  11:13 AM      PHYSICIAN SECTION    Prognosis: {Prognosis:6151427293}    Condition at Discharge: Fior Leiva Patient Condition:[id number]}    Rehab Potential (if transferring to Rehab): {Prognosis:5962081091}    Recommended Labs or Other Treatments After Discharge:                           Out-patient antibiotic therapy (OPAT)/ Antibiotic Infusion orders          Diagnosis: MSSA bacteremia and endocarditis     Organism/ culture: See above     Name and dose of Antimicrobial: IV cefazolin 1 g every 24 hour     Antimicrobial start date: calculated from 12/22/2021     Antimicrobial completion date planned: January 10, 2021     Lab monitoring: CBC, Chem 12 once a week, to be       collected every Monday or Tuesday morning until the patient is off IV  antibiotics. Fax weekly lab results to Krishna Layne MD's office at        [phone]. Please maintain tunneled central line until the patient is on IV antibiotics. Ok to administer normal saline flushes via tunneled central line as needed. ** Please notify Krishna Layne MD's office with any change in patient's        status, transfer out of facility or to hospital by calling [phone]           Outpatient Follow up: No outpatient ID f/u needed if continues to do well. Due to COVID 19 pandemic, if needed, a f/u video visit can be arranged via my office at patient's request on as-needed basis. Physician Signature:  Electronically signed by Krishna Layne MD on                                                      12/22/21 at 5:52 PM EST           Physician Certification: I certify the above information and transfer of Arias Nice  is necessary for the continuing treatment of the diagnosis listed and that he requires {Admit to Appropriate Level of Care:33619} for {GREATER/LESS:365622995} 30 days.      Update Admission H&P: {CHP DME Changes in IOUHQ:412937993}    PHYSICIAN SIGNATURE:  {Esignature:[id number]}

## 2021-12-21 NOTE — PROGRESS NOTES
[facility]     Antibiotic      PATSY  Intrp  *Adult dosage             Pk bld level Pk urine   lev.   _____________________________________________________________________________   Gentamicin     <=0.5   S   IV 1.7mg/kg q 8 h          5-7          >=100           Oxacillin       0.5    S   IV 500mg                   43                           Vancomycin       1     S   IV 1000mg q 12 h           20-50        800             Trimethoprim/Ruggiero <=10   S   AQ346cpHCL/800mgSMX q 12h   1-2TMP/64-89M54-20SOW/97SM                              IV 160mgTMP/800mgSMX  q 8 h9TMP/105 SMX                 _____________________________________________________________________________   S=Susceptible I=Intermediate R=Resistant Susceptibility is determined by   comparing the PATSY of organism to the achievable   blood or urine level of drug. Level at the site of infection should be a   minimum of 5-10 times the PATSY.   _____________________________________________________________________________   PATSY and blood and urine levels=mcg/ml. *Standard dosages from [place] to   Antimicrobial Therapy and assumes moderate   infection in normal adult populations. For pts. with renal or liver disease   consult PDR or pharmacist.   _____________________________________________________________________________   [address]    Specimen Collected: 12/08/21  2:00 PM Last Resulted: 12/10/21  8:00 PM   Received From:  [address]  Result Received: 12/14/21  7:38 PM   View Encounter     Outside hospital records: 2D echo done on 12/9/2021      [address]  Outside Information        ECHOCARDIOGRAM    Anatomical Region Laterality Modality   -- -- Ultrasound       Narrative    Cesario Glover,      12/9/2021  2:20 PM   [address] Echocardiography Report   Tracie Corona is a 27 y.o. male     1995         N#  [id number]                  ORDERING PROVIDER:  No referring provider defined for this   encounter.      PROCEDURE: ECHOCARDIOGRAPHY with 2D, COLOR and SPECTRAL DOPPLER     Indications: Edema, Evaluate EF & Valves     Echo Tech:Montserrat Gann     Linear Measurement     Left Ventricular ID, Systole (LVIDs) (2.3-3.9cm)  2.44cm   Left Ventricular ID, Diastole (LVIDd) (3.5-5.6cm)  4.02cm   Interventricular Septal Wall (IVSD) (0.7-1.2cm)  1.19cm   Left Ventricular Posterior Wall (LVPWD) (0.7-1.2cm) 1.17cm   Left Atrium, Systole (LA) (1.9-4.0)    3.4cm   Aortic Root, Diastole (Ao) (1.6-2.6cm)   2.7cm     LV outflow tract (LVOT)     1.9cm   Ejection Fraction      70%   Fractional Shortening      39%     Doppler Data     LVOT velocity (VI)      149cm/s   Peak Gradient  (LVOT)     9mmHg   Velocity in the Aorta (V2)     210cm/s   Peak Gradient  (Aorta)     18mmHg   Mean Gradient (Aorta)     7mmHg   Aortic Valve Area (DANE)     2.20cm2   Mitral Valve half time (t1/2)     74ms   Mitral Valve area (MVA)     2.97 cm2   Tricuspid valve regurgitate velocity (m/s)   274cm/s   Right Ventricular Systolic Pressure(mm Hg)   40mmHg   TV Peak Velocity      82cm/s   PV Peak Velocity      171cm/s     Vitals:    12/09/21 0700 12/09/21 0713 12/09/21 0800 12/09/21 1001   BP: 132/74 135/76 133/77 133/77   Pulse: 87 86 84     Resp: 16 18 18     Temp:   97 degrees F (36.1 degrees C)     TempSrc:   Axillary     SpO2: 98% 96% 97%     Weight:    54.9 kg (121 lb)   Height:    1.6 m (5' 3\")             STAFF ENTERING DATA: Tika Mejia 12/9/2021           2D Impression     Technical quality: Good       Left ventricle: Normal internal dimensions with mild concentric   hypertrophy   The estimated LVEF is 65%     Left atrium: Normal size   Right ventricle: Normal size and function   Right atrium: Normal size   Aortic root: Normal   Pericardium: Real circumferential pericardial effusion   Mitral valve: No prolapse   Aortic valve: Trileaflet file for this patient. Known drug allergies: All allergies were reviewed and updated    No Known Allergies    Social history:     Social History:  All social andepidemiologic history was reviewed and updated by me today as needed. · Tobacco use:   reports that he has been smoking cigarettes. He has never used smokeless tobacco.  · Alcohol use:   reports no history of alcohol use. · Currently lives [place] [address]  ·  reports current drug use. Drug: Marijuana [employer]). COVID VACCINATION AND LAB RESULT RECORDS:     Internal Administration   First Dose      Second Dose           Last COVID Lab SARS-CoV-2, NAAT (no units)   Date Value   12/19/2021 Not Detected            Assessment:     The patient is a 27 y.o. old male who  has a past medical history of Diabetes ([address] Foot drop, left foot, Kidney failure, and Neuropathy. 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 The culture report is available in epic and I reviewed the respiratory data to confirm MSSA. He is currently on IV cefazolin.     The patient has significant chronic kidney disease and nephrology is following and currently he is on renally dosed IV cefazolin    He had 2D echo done on 12/9/2021 at outside hospital which showed a small echogenic structure on the ventricular surface of the aortic valve concerning for vegetation    Blood cultures from this admission from 12/16/2021 have been negative    Plan:     Diagnostic Workup:      · Continue to follow  fever curve, WBC count and blood cultures. · Continue to monitor blood counts, liver and renal function. Antimicrobials:    · Will continue IV cefazolin 1 g every 24 hour  · Continue to monitor his vitals closely  · Plan is to continue antibiotics until January 8  · If the patient goes dialysis, IV cefazolin can be changed to dialysis days only  · Once renal function is stabilized, notify ID physician covering and we can give a final antibiotic plan  · We will follow up on the culture results and clinical progress and will make further recommendations accordingly. · Continue close vitals monitoring. · Maintain good glycemic control. · Fall precautions. Aspiration precautions. · Continue to watch for new fever or diarrhea. · DVT prophylaxis. · Discussed all above with patient and RN. Drug Monitoring:    · Continue monitoring for antibiotic toxicity as follows: CBC, CMP   · Continue to watch for following: new or worsening fever, new hypotension, hives, lip swelling and redness or purulence at vascular access sites. I/v access Management:    · Continue to monitor i.v access sites for erythema, induration, discharge or tenderness. · As always, continue efforts to minimize tubes/lines/drains as clinically appropriate to reduce chances of line associated infections. Patient education and counseling:        · The patient was educated in detail about the side-effects of various antibiotics and things to watch for like new rashes, lip swelling, severe reaction, worsening diarrhea, break through fever etc.  · Discussed patient's condition and what to expect. All of the patient's questions were addressed in a satisfactory manner and patient verbalized understanding all instructions.       Level of complexity of visit: High     Diabetes mellitus education and counseling:    Patient was educated in detail about the importance of keeping diabetes under control to improve the cure rate of infection and prevent future infections. Patient was advised to check blood glucose level regularly and to stay compliant with the diabetes medications. Patient was advised to the trim the toe nails carefully, wear shoes or slippers at all times and check both feet everyday before going to bed to look for any cuts, blisters, swelling or redness. Importance of washing the feet everyday with soap and water and keeping them dry, and seeking prompt medical attention in case of a non-healing wound or ulcer on the feet was also highlighted. TIME SPENT TODAY:     - Spent over  37 minutes on visit (including interval history, physical exam, review of data including labs, cultures, imaging, development and implementation of treatment plan and coordination of complex care). More than 50 percent of this includes face-to-face time spent with the patient for counseling and coordination of care. Thank you for involving me in the care of your patient. I will continue to follow. If you have anyadditional questions, please do not hesitate to contact me. Subjective: Interval history: Interval history was obtained from chart review and patient/ RN. The patient is afebrile. He is on IV cefazolin. He is tolerating antibiotics okay     REVIEW OF SYSTEMS:     Review of Systems   Constitutional: Positive for fatigue. Negative for chills, diaphoresis and fever. HENT: Negative for ear discharge, ear pain, rhinorrhea, sore throat and trouble swallowing. Eyes: Negative for discharge and redness. Respiratory: Negative for cough, shortness of breath and wheezing. Cardiovascular: Negative for chest pain and leg swelling. Gastrointestinal: Negative for abdominal pain, constipation, diarrhea and nausea. Endocrine: Negative for polyuria.    Genitourinary: Negative for dysuria, flank pain, frequency, hematuria and urgency. Musculoskeletal: Negative for back pain and myalgias. Skin: Negative for rash. Neurological: Negative for dizziness, seizures and headaches. Hematological: Does not bruise/bleed easily. Psychiatric/Behavioral: Negative for hallucinations and suicidal ideas. All other systems reviewed and are negative. Past Medical History: All past medical history reviewed today. Past Medical History:   Diagnosis Date    Diabetes ([address]     Foot drop, left foot     Kidney failure     STAGE 4    Neuropathy        Past Surgical History: All past surgical history was reviewed today. No past surgical history on file. Family History: All family history was reviewed today. No family history on file. Objective:       PHYSICAL EXAM:      Vitals:   Vitals:    12/21/21 0337 12/21/21 0802 12/21/21 1112 12/21/21 1529   BP: (!) 162/86 (!) 172/93 (!) 152/84 (!) 142/75   Pulse: 93 92 93 90   Resp: 18 16 16 16   Temp: 98.5 °F (36.9 °C) 98.4 °F (36.9 °C) 97.9 °F (36.6 °C) 98.1 °F (36.7 °C)   TempSrc: Oral Oral Oral Oral   SpO2: 95% 95% 95% 92%   Weight:           Physical Exam  Vitals and nursing note reviewed. Constitutional:       Appearance: Normal appearance. He is well-developed. HENT:      Head: Normocephalic and atraumatic. Right Ear: External ear normal.      Left Ear: External ear normal.      Nose: Nose normal. No congestion or rhinorrhea. Mouth/Throat:      Mouth: Mucous membranes are moist.      Pharynx: No oropharyngeal exudate or posterior oropharyngeal erythema. Eyes:      General: No scleral icterus. Right eye: No discharge. Left eye: No discharge. Conjunctiva/sclera: Conjunctivae normal.      Pupils: Pupils are equal, round, and reactive to light. Cardiovascular:      Rate and Rhythm: Normal rate and regular rhythm. Pulses: Normal pulses. Heart sounds: No murmur heard. No friction rub.    Pulmonary:      Effort: Pulmonary effort is normal. No respiratory distress. Breath sounds: Normal breath sounds. No stridor. No wheezing, rhonchi or rales. Abdominal:      General: Bowel sounds are normal.      Palpations: Abdomen is soft. Tenderness: There is no abdominal tenderness. There is no right CVA tenderness, left CVA tenderness, guarding or rebound. Musculoskeletal:         General: No swelling or tenderness. Normal range of motion. Cervical back: Normal range of motion and neck supple. No rigidity. No muscular tenderness. Lymphadenopathy:      Cervical: No cervical adenopathy. Skin:     General: Skin is warm and dry. Coloration: Skin is not jaundiced. Findings: No erythema or rash. Neurological:      General: No focal deficit present. Mental Status: He is alert and oriented to person, place, and time. Mental status is at baseline. Motor: No abnormal muscle tone. Psychiatric:         Mood and Affect: Mood normal.         Behavior: Behavior normal.         Thought Content: Thought content normal.           Lines and drains: All vascular access sites are healthy with no local erythema, discharge or tenderness. Intake and output:    I/O last 3 completed shifts: In: 1500 [P.O.:1500]  Out: 200 [Urine:3500]    Lab Data:   All available labs and old records have been reviewed by me.     CBC:  Recent Labs     12/19/21  0444 12/20/21  0334 12/21/21  0445   WBC 6.6 7.0 7.1   RBC 2.68* 2.67* 2.60*   HGB 7.7* 7.7* 7.6*   HCT 23.6* 23.2* 22.7*    447 427   MCV 88.2 86.8 87.4   MCH 28.6 28.9 29.1   MCHC 32.5 33.3 33.3   RDW 16.8* 16.3* 16.5*        BMP:  Recent Labs     12/20/21  0745 12/20/21  1117 12/21/21  0445    136 134*   K 4.1 4.3 4.7    102 102   CO2 18* 18* 17*   * 104* 115*   CREATININE 4.9* 5.3* 5.7*   CALCIUM 8.1* 7.9* 7.8*   GLUCOSE 159* 272* 275*        Hepatic Function Panel:   Lab Results   Component Value Date    ALKPHOS 71 12/21/2021    ALT 13 12/21/2021    AST 50 12/21/2021 PROT 5.1 12/21/2021    BILITOT <0.2 12/21/2021    BILIDIR <0.2 12/14/2021    LABALBU 2.1 12/21/2021       CPK: No results found for: CKTOTAL  ESR:   Lab Results   Component Value Date    SEDRATE 106 (H) 12/16/2021     CRP:   Lab Results   Component Value Date    CRP 13.2 (H) 12/16/2021           Imaging: All pertinent images and reports for the current visit were reviewed by me during this visit. VL Extremity Venous Left   Final Result      XR CHEST PORTABLE   Final Result   Right-sided pleural effusion with adjacent right basilar consolidation,   either atelectasis or pneumonia. Medications: All current and past medications were reviewed.      metOLazone  5 mg Oral Daily    [START ON 12/22/2021] insulin lispro  5 Units SubCUTAneous Daily with breakfast    insulin lispro  5 Units SubCUTAneous Lunch    insulin lispro  8 Units SubCUTAneous Dinner    amLODIPine  10 mg Oral Daily    sodium bicarbonate  1,300 mg Oral TID    insulin lispro  0-12 Units SubCUTAneous TID WC    insulin lispro  0-6 Units SubCUTAneous Nightly    furosemide  80 mg IntraVENous BID    pregabalin  200 mg Oral BID    heparin (porcine)  5,000 Units SubCUTAneous 3 times per day    sodium chloride flush  5-40 mL IntraVENous 2 times per day    QUEtiapine  25 mg Oral Nightly    FLUoxetine  20 mg Oral Daily    insulin glargine  20 Units SubCUTAneous Daily    ceFAZolin  1,000 mg IntraVENous Q24H        sodium chloride 25 mL (12/20/21 1248)    dextrose         hydrALAZINE, acetaminophen **OR** acetaminophen, sodium chloride, sodium chloride flush, glucose, dextrose, glucagon (rDNA), dextrose      Problem list:       Patient Active Problem List   Diagnosis Code    DKA, type 1, not at goal [facility]) E10.10    Acute infective endocarditis I33.0    MONICA (acute kidney injury) ([facility] H64.8    Metabolic acidosis T01.0    Peripheral edema R60.9    CKD (chronic kidney disease) N18.9    DKA (diabetic ketoacidosis) ([facility] E11.10  Type 1 diabetes mellitus ([facility] E10.9       Please note that this chart was generated using Dragon dictation software. Although every effort was made to ensure the accuracy of this automated transcription, some errors in transcription may have occurred inadvertently. If you may need any clarification, please do not hesitate to contact me through EPIC or at the phone number provided below with my electronic signature. Any pictures or media included in this note were obtained after taking informed verbal consent from the patient and with their approval to include those in the patient's medical record.       Barbara Meza MD, MPH, FACP, FIDSA  12/21/2021, 3:48 PM  [facility]   [address] [place] [address], [address] [place] [address]  Office: [phone]  Fax: [phone]  Clinic days:  Tuesday & Thursday

## 2021-12-21 NOTE — CONSULTS
Consulting Physician: Dr. Ashwin Mena    Reason for Consult: Scrotal support    History of Present Illness: Rick Monteiro is a 27 y.o. male with type 1 diabetes, stage IV CKD with endocarditis, significant edema. We have been consulted for scrotal swelling. He had parker catheter removed yesterday and is spontaneously voiding without issues. His scrotum and penile shaft is very edematous and likely will resolve with elevation and lasix. Patient reports the swelling hasn't gotten any better or worse since admission. There is no pain associated with this. Past Medical History:   Past Medical History:   Diagnosis Date    Diabetes ([facility]     Foot drop, left foot     Kidney failure     STAGE 4    Neuropathy        Past Surgical History:  No past surgical history on file. Social History:  Social History     Socioeconomic History    Marital status: Single     Spouse name: Not on file    Number of children: Not on file    Years of education: Not on file    Highest education level: Not on file   Occupational History    Not on file   Tobacco Use    Smoking status: Current Some Day Smoker     Types: Cigarettes    Smokeless tobacco: Never Used   Vaping Use    Vaping Use: Every day   Substance and Sexual Activity    Alcohol use: Never    Drug use: Yes     Types: Marijuana [place])    Sexual activity: Not Currently   Other Topics Concern    Not on file   Social History Narrative    Not on file     Social Determinants of Health     Financial Resource Strain:     Difficulty of Paying Living Expenses: Not on file   Food Insecurity:     Worried About Running Out of Food in the Last Year: Not on file    [place] of Food in the Last Year: Not on file   Transportation Needs:     Lack of Transportation (Medical): Not on file    Lack of Transportation (Non-Medical):  Not on file   Physical Activity:     Days of Exercise per Week: Not on file    Minutes of Exercise per Session: Not on file   Stress:  Feeling of Stress : Not on file   Social Connections:     Frequency of Communication with Friends and Family: Not on file    Frequency of Social Gatherings with Friends and Family: Not on file    Attends Church Services: Not on file    Active Member of Clubs or Organizations: Not on file    Attends Club or Organization Meetings: Not on file    Marital Status: Not on file   Intimate Partner Violence:     Fear of Current or Ex-Partner: Not on file    Emotionally Abused: Not on file    Physically Abused: Not on file    Sexually Abused: Not on file   Housing Stability:     Unable to Pay for Housing in the Last Year: Not on file    Number of [place] in the Last Year: Not on file    Unstable Housing in the Last Year: Not on file       Family History:  No family history on file.     Meds:   Current Facility-Administered Medications: amLODIPine (NORVASC) tablet 10 mg, 10 mg, Oral, Daily  insulin lispro (HUMALOG) injection vial 5 Units, 5 Units, SubCUTAneous, TID WC  sodium bicarbonate tablet 1,300 mg, 1,300 mg, Oral, TID  hydrALAZINE (APRESOLINE) injection 5 mg, 5 mg, IntraVENous, Q6H PRN  insulin lispro (HUMALOG) injection vial 0-12 Units, 0-12 Units, SubCUTAneous, TID WC  insulin lispro (HUMALOG) injection vial 0-6 Units, 0-6 Units, SubCUTAneous, Nightly  furosemide (LASIX) injection 80 mg, 80 mg, IntraVENous, BID  pregabalin (LYRICA) capsule 200 mg, 200 mg, Oral, BID  acetaminophen (TYLENOL) tablet 650 mg, 650 mg, Oral, Q6H PRN **OR** acetaminophen (TYLENOL) suppository 650 mg, 650 mg, Rectal, Q6H PRN  heparin (porcine) injection 5,000 Units, 5,000 Units, SubCUTAneous, 3 times per day  0.9 % sodium chloride infusion, 25 mL, IntraVENous, PRN  sodium chloride flush 0.9 % injection 5-40 mL, 5-40 mL, IntraVENous, 2 times per day  sodium chloride flush 0.9 % injection 5-40 mL, 5-40 mL, IntraVENous, PRN  glucose (GLUTOSE) 40 % oral gel 15 g, 15 g, Oral, PRN  dextrose 50 % IV solution, 12.5 g, IntraVENous, PRN  glucagon (rDNA) injection 1 mg, 1 mg, IntraMUSCular, PRN  dextrose 5 % solution, 100 mL/hr, IntraVENous, PRN  QUEtiapine (SEROQUEL) tablet 25 mg, 25 mg, Oral, Nightly  FLUoxetine (PROZAC) capsule 20 mg, 20 mg, Oral, Daily  insulin glargine (LANTUS) injection vial 20 Units, 20 Units, SubCUTAneous, Daily  ceFAZolin (ANCEF) 1,000 mg in dextrose 5 % 50 mL IVPB (mini-bag), 1,000 mg, IntraVENous, Q24H    Review of Systems:  10 Systems were reviewed and negative except as in HPI    Vitals:  BP (!) 172/93   Pulse 92   Temp 98.4 °F (36.9 °C) (Oral)   Resp 16   Wt 147 lb 7.8 oz (66.9 kg)   SpO2 95%   BMI 26.13 kg/m²     Intake/Output Summary (Last 24 hours) at 12/21/2021 0854  Last data filed at 12/21/2021 0730  Gross per 24 hour   Intake 1270 ml   Output 3750 ml   Net -2480 ml       Physical Exam:  General Appearance: Alert and oriented, cooperative, no distress, appears stated age  Head: Normocephalic, without obvious abnormality, atraumatic  Back: no CVA tenderness  Lungs: respirations unlabored, no wheezing  Heart: Regular rate and rhythm, lower extremity edema noted  Abdomen: Soft, non-tender, non-distended, no masses  Skin: Skin color, texture, turgor normal, no rashes or lesions  Neurologic: no gross deficits  Male :   Nonpalpable bladder   No CVA tenderness   Scrotum and penile shaft very edematous, no crepitus and tolerates exam well   KATIE Not indicated    Labs:  CBC   Lab Results   Component Value Date    WBC 7.1 12/21/2021    RBC 2.60 12/21/2021    HGB 7.6 12/21/2021    HCT 22.7 12/21/2021    MCV 87.4 12/21/2021    MCH 29.1 12/21/2021    MCHC 33.3 12/21/2021    RDW 16.5 12/21/2021     12/21/2021    MPV 9.0 12/21/2021     BMP   Lab Results   Component Value Date     12/21/2021    K 4.7 12/21/2021    K 4.3 12/20/2021     12/21/2021    CO2 17 12/21/2021     12/21/2021    CREATININE 5.7 12/21/2021    GLUCOSE 275 12/21/2021    CALCIUM 7.8 12/21/2021 Urinalysis:   Lab Results   Component Value Date    COLORU YELLOW 12/19/2021    GLUCOSEU >=1000 12/19/2021    BLOODU SMALL 12/19/2021    NITRU Negative 12/19/2021    LEUKOCYTESUR Negative 12/19/2021       Imaging:  NA     Impression/Plan:   - 26y. o. male with endocarditis and significant edema.  - Recommend scrotal elevation and continue lasix, this will gradually resolve. - No scrotal support available in the hospital, can wear supportive underwear/jock strap as outpatient for extra support.      Wally Gale, LAVERNE - CNP 12/21/20218:54 AM

## 2021-12-21 NOTE — PROGRESS NOTES
[facility]                                               Progress note    Summary:   Omar Bolden is being seen by nephrology for MONICA on CKD stage 4. Admitted with endocarditis and DKA. Has known type 1 DM, complicated by CKD stage 4. Only recently moved here form [facility] [place] Lives closer to [place] with his cousin now. He was transferred from OSH to here. Interval History  Seen at bedside. SOB resolved. Edema improving on lasix IV 80 mg BID  Made 3625 mL urine yesterday  Weight still keeps going up despite a significant amount of urine output  Cr stable at 5.7  BP still slightly elevated. Plan:   - continue lasix to 80 mg BID. Metolazone 5 mg daily  - 24 hour urine protein, albumin, and creatinine complete, results pending  - continue sodium bicarb 1300 mg TID for acidosis  - daily standing weights. Maria E Gross MD  [facility]  Office: [phone]      Assessment:  Acute kidney injury  Multifactorial MONICA in the setting of endocarditis, antibiotics including vancomycin  Creatinine peaked at 4.8  Baseline creatinine 3.2  Has Farris's not obstructed. random Vanco level of 24.6  Is significant CKD so low reserve for renal injury  He was hyperglycemic so may become hypovolemic  Clinically he has a lot of edema and profound hypoalbuminemia  Nephrotic range proteinuria in the setting of known type 1 diabetes  Baseline creatinine reported to be around 3.2, he was stage IV CKD already  Protein creatinine ratio 7.8 g urinalysis showed no blood 5-9 WBCs no casts, positive for glucose urea  Does have anemia but no thrombocytopenia to suggest TMA process.   c3 and c4 normal.   DUTCH  ANCA  AntiGBM    Chronic kidney disease stage IV  Has diabetic nephropathy most likely in setting of longstanding proteinuria and uncontrolled diabetes  Was seeing a nephrologist in [place] and was told that he was stage IV  He has not been referred for transplant work-up or dialysis access as of now.  Was having issues with drug use    Metabolic acidosis  Likely secondary to renal failure  Was on a bicarb drip  Had mild anion gap with hyperglycemia which improved with an insulin drip. Lactate negative at 0.9  Urine tox was negative    Generalized edema  EF 65% mild concentric hypertrophy  Has nephrotic range proteinuria and hypoalbuminemia  He has significant pitting lower extremity edema. Chest x-ray shows right-sided pleural effusion with adjacent right basilar consolidation    MSSA bacterial endocarditis  There is an aortic valve vegetation suspected on echo from [facility]  He is history receiving vancomycin infusions  He is now on cefazolin  Cardiology has seen and there is no plan for DIANA at this time    Type 1 diabetes  Complicated by renal failure  Blood glucose 862 on admission with anion gap 17  pH 7.3 bicarb 13 on blood gas  Treated with an insulin drip and has improved. HPI:  Mynor Mandel is being seen by nephrology for MONICA on CKD stage 4. This is a 28 yo man with past medical history significant for type 1 diabetes, CKD stage IV for which she was following with a nephrologist in [place]. He moved here about 3 weeks ago to live with his cousin because he became homeless when he was in [place]. He does not have doctors in town yet. He was initially taken to [facility] emergency room for altered mental status and agitation. He was found to be hyperglycemic and there was concern for bacterial endocarditis. Patient tells me that a couple years ago he had an issue with methamphetamine but no longer does this. He does smoke cigarettes, tobacco use currently. Denies IV drug use. He says that he has been having generalized edema for the past 2 months at least.  He was diagnosed with diabetes around 6years old has had diabetes for about 20 years. He denies hematuria but has seen very foamy frothy urine in the toilet. He does not use any NSAIDs.   He was diagnosed with aortic valve endocarditis MSSA bacteria. He was post beginning IV vancomycin infusions. His baseline creatinine is around 3.2. Currently he is awake and alert in no acute distress. He has significant lower extremity edema 2+ pitting. No rashes or lesions. No petechia. ROS:   Positives Listed Bold. All other remaining systems are negative. Constitutional:  fever, chills, weakness, weight change, fatigue,      Skin:  rash, pruritus, hair loss, bruising, dry skin, petechiae. Head, Face, Neck   headaches, swelling,  cervical adenopathy. Respiratory: shortness of breath, cough, or wheezing  Cardiovascular: chest pain, palpitations, dizzy, edema  Gastrointestinal: nausea, vomiting, diarrhea, constipation,belly pain    Yellow skin, blood in stool  Musculoskeletal:  back pain, muscle weakness, gait problems,       joint pain or swelling. Genitourinary:  dysuria, poor urine flow, flank pain, blood in urine  Neurologic:  vertigo, TIA'S, syncope, seizures, focal weakness  Psychosocial:  insomnia, anxiety, or depression. Additional positive findings: -     PMH:   Past medical history, surgical history, social history, family history are reviewed and updated as appropriate. Reviewed current medication list.   Allergies reviewed and updated as needed. PE:   Vitals:    12/21/21 0802   BP: (!) 172/93   Pulse: 92   Resp: 16   Temp: 98.4 °F (36.9 °C)   SpO2: 95%       General appearance: white male in NAD, fully alert and oriented. Comfortable. HEENT: EOM intact, no icterus. Trachea is midline. Neck : No masses, appears symmetrical, no JVD  Respiratory: Respiratory effort appears normal, bilateral equal chest rise, no wheeze, no crackles   Cardiovascular: Ausculation shows RRR ++ edema  Abdomen: No visible mass or tenderness, non distended. Musculoskeletal:  Joints with no swelling or deformity. Skin:no rashes, ulcers, induration, no jaundice. Neuro: face symmetric, no focal deficits.  Appropriate responses.        Lab Results   Component Value Date    CREATININE 5.7 (HH) 12/21/2021     (HH) 12/21/2021     (L) 12/21/2021    K 4.7 12/21/2021     12/21/2021    CO2 17 (L) 12/21/2021      Lab Results   Component Value Date    WBC 7.1 12/21/2021    HGB 7.6 (L) 12/21/2021    HCT 22.7 (L) 12/21/2021    MCV 87.4 12/21/2021     12/21/2021     Lab Results   Component Value Date    CALCIUM 7.8 (L) 12/21/2021    PHOS 5.9 (H) 12/16/2021

## 2021-12-21 NOTE — PROGRESS NOTES
Progress Note  Admit Date: 12/16/2021       CC: No chief complaint on file. Overnight Events: No acute overnight events. Pt seen and examined at bedside. Feeling fatigued, otherwise no dyspnea, chest pain, palpitations, N/V. Interval History: Refer to H&P    Review of Systems  General appearance: alert, appears stated age and cooperative. +fatigue  Skin: Skin color, texture, normal. No rashes or lesions  HEENT: No nose bleed, headache, vision problems  CV: No chest pain, tightness, pressure,   Respiratory: No c/o SOB, ALANIZ, Orthopnea, PND  GI: No abdominal pain, black stool, bloating  Limbs: No c/o edema, pain, intermittent claudication, joint pains. +LE edema  Neuro: No dizziness, lightheadedness, syncope, gait problems, memory problems  Psych: grossly normal. No SI/depression. Scheduled Medications:    amLODIPine  10 mg Oral Daily    insulin lispro  5 Units SubCUTAneous TID WC    sodium bicarbonate  1,300 mg Oral TID    insulin lispro  0-12 Units SubCUTAneous TID WC    insulin lispro  0-6 Units SubCUTAneous Nightly    furosemide  80 mg IntraVENous BID    pregabalin  200 mg Oral BID    heparin (porcine)  5,000 Units SubCUTAneous 3 times per day    sodium chloride flush  5-40 mL IntraVENous 2 times per day    QUEtiapine  25 mg Oral Nightly    FLUoxetine  20 mg Oral Daily    insulin glargine  20 Units SubCUTAneous Daily    ceFAZolin  1,000 mg IntraVENous Q24H      PRN Medications: hydrALAZINE, acetaminophen **OR** acetaminophen, sodium chloride, sodium chloride flush, glucose, dextrose, glucagon (rDNA), dextrose  Diet: ADULT DIET; Regular; 3 carb choices (45 gm/meal); Low Sodium (2 gm); Low Potassium (Less than 3000 mg/day);  Low Phosphorus (Less than 1000 mg)    Continuous Infusions:   sodium chloride 25 mL (12/20/21 1248)    dextrose         PHYSICAL EXAM:  BP (!) 172/93   Pulse 92   Temp 98.4 °F (36.9 °C) (Oral)   Resp 16   Wt 147 lb 7.8 oz (66.9 kg)   SpO2 95%   BMI 26.13 kg/m² Recent Labs     12/20/21  1156 12/20/21  1651 12/20/21  2013 12/21/21  0342 12/21/21  0730   POCGLU 258* 139* 86 255* 382*       Intake/Output Summary (Last 24 hours) at 12/21/2021 1029  Last data filed at 12/21/2021 1011  Gross per 24 hour   Intake 1260 ml   Output 3650 ml   Net -2390 ml       General appearance: No apparent distress, appears stated age and cooperative. HEENT: Pupils equal, round, and reactive to light. Conjunctivae/corneas clear. Neck: Supple, with full range of motion. No jugular venous distention. Trachea midline. Respiratory:  Normal respiratory effort. Clear to auscultation, bilaterally without Rales/Wheezes/Rhonchi. Cardiovascular: Regular rate and rhythm with normal S1/S2 without murmurs, rubs or gallops. Abdomen: Soft, non-tender, non-distended with normal bowel sounds. Musculoskeletal: No clubbing, cyanosis or edema bilaterally. Full range of motion without deformity. Skin: Skin color, texture, turgor normal.  No rashes or lesions. Neurologic:  Neurovascularly intact without any focal sensory/motor deficits. Cranial nerves: II-XII intact, grossly non-focal.  Psychiatric: Alert and oriented, thought content appropriate, normal insight    LABS:  Recent Labs     12/19/21 0444 12/20/21  0334 12/21/21  0445   WBC 6.6 7.0 7.1   HGB 7.7* 7.7* 7.6*   HCT 23.6* 23.2* 22.7*    447 427                                                                    Recent Labs     12/20/21  0745 12/20/21  1117 12/21/21  0445    136 134*   K 4.1 4.3 4.7    102 102   CO2 18* 18* 17*   * 104* 115*   CREATININE 4.9* 5.3* 5.7*   GLUCOSE 159* 272* 275*     Recent Labs     12/19/21  0444 12/20/21  0334 12/21/21  0445   AST 25 30 50*   ALT 12 8* 13   BILITOT <0.2 <0.2 <0.2   ALKPHOS 75 77 71     No results for input(s): TROPONINI in the last 72 hours. No results for input(s): BNP in the last 72 hours. No results for input(s): CHOL, HDL in the last 72 hours.     Invalid input(s): LDLCALCU  No results for input(s): INR in the last 72 hours. Assessment & Plan:      1. MSSA endocarditis of aortic valve  - Cardiology consulted for possible DIANA  - continue Cefazolin    2. Acute diastolic CHF EF 15% with anasarca sec to valvular heart diseaase  Swollen LUE- Doppler US LUE showed no DVT  -Lasix 40mg iv  bid    3. MONICA on CKD4 likely sec to vancomycin toxicity  - Cr 4.9 from baseline of around 3.0-3.5  - Proteinuria/ hypoalbuminuria/edema- may well have a component of nephrotic syndrome  -can benefit from measuring urinary protein excretion if ok with nephrology        4. DKA-POA-resolved. MGA, hyperglycemia,low HCO3-,Ketonuria-resolved. Covid 19 test negative. AG12  -continue glargine at Insulin 20U qhs  -start 10U Humalog AC. -Accuchecks AC +qhs  -diabetic diet    5. Hypertroponinemia sec to CKD and not NSTEMI  -No ischemic changes on EKG, probably not ACS  -no ischemic workup needed    6. Hyperkalemia, resolved    7. Anasarca with hydrocoele sec to #7  -urology consulted for scrotal support and appreciate their input. New problem(s);  1. Hypertensive urgency;  -hydralazine 10mg iv q6 prn for BP>160/90mmHg  -Nephro on board and willl advice on BP management given the Acute on chronic kidney disease. The patient and / or the family were informed of the results of any tests, a time was given to answer questions, a plan was proposed and they agreed with plan. DVT Prophylaxis: Heparin  Diet: ADULT DIET; Regular; 3 carb choices (45 gm/meal); Low Sodium (2 gm); Low Potassium (Less than 3000 mg/day);  Low Phosphorus (Less than 1000 mg)  Code Status: Full Code    Disposition: Inpatient pending clinical improvement      Zaynab Sol MD   Internal Medicine  12/21/2021 10:29 AM  Reach via [employer]

## 2021-12-21 NOTE — PLAN OF CARE
Problem: Pain:  Goal: Pain level will decrease  Description: Pain level will decrease  12/21/2021 0302 by Ana Le RN  Outcome: Ongoing     Problem: Pain:  Goal: Control of acute pain  Description: Control of acute pain  12/21/2021 0302 by Ana Le RN  Outcome: Ongoing     Problem: Pain:  Goal: Control of chronic pain  Description: Control of chronic pain  12/21/2021 0302 by Ana Le RN  Outcome: Ongoing     Problem: Pain:  Goal: Patient's pain/discomfort is manageable  Description: Patient's pain/discomfort is manageable  12/21/2021 0302 by Ana Le RN  Outcome: Ongoing     Problem: Skin Integrity:  Goal: Will show no infection signs and symptoms  Description: Will show no infection signs and symptoms  12/21/2021 0302 by Ana Le RN  Outcome: Ongoing     Problem: Skin Integrity:  Goal: Absence of new skin breakdown  Description: Absence of new skin breakdown  12/21/2021 0302 by Ana Le RN  Outcome: Ongoing     Problem: Falls - Risk of:  Goal: Will remain free from falls  Description: Will remain free from falls  12/21/2021 0302 by Ana Le RN  Outcome: Ongoing     Problem: Falls - Risk of:  Goal: Absence of physical injury  Description: Absence of physical injury  12/21/2021 0302 by Ana Le RN  Outcome: Ongoing     Problem: Infection:  Goal: Will remain free from infection  Description: Will remain free from infection  12/21/2021 0302 by Ana Le RN  Outcome: Ongoing     Problem: Safety:  Goal: Free from accidental physical injury  Description: Free from accidental physical injury  12/21/2021 0302 by Ana Le RN  Outcome: Ongoing     Problem: Safety:  Goal: Free from intentional harm  Description: Free from intentional harm  12/21/2021 0302 by Ana Le RN  Outcome: Ongoing     Problem: Daily Care:  Goal: Daily care needs are met  Description: Daily care needs are met  12/21/2021 0302 by Shad Dupree Chastity Parkinson RN  Outcome: Ongoing     Problem: Skin Integrity:  Goal: Skin integrity will stabilize  Description: Skin integrity will stabilize  12/21/2021 0302 by Bob Rose RN  Outcome: Ongoing     Problem: Discharge Planning:  Goal: Patients continuum of care needs are met  Description: Patients continuum of care needs are met  12/21/2021 0302 by Bob Rose RN  Outcome: Ongoing     Problem:  Activity:  Goal: Risk for activity intolerance will decrease  Description: Risk for activity intolerance will decrease  12/21/2021 0302 by Bob Rose RN  Outcome: Ongoing     Problem: Coping:  Goal: Ability to identify and develop effective coping behavior will improve  Description: Ability to identify and develop effective coping behavior will improve  12/21/2021 0302 by Bob Rose RN  Outcome: Ongoing     Problem: Coping:  Goal: Expressions of feelings of enhanced comfort will increase  Description: Expressions of feelings of enhanced comfort will increase  12/21/2021 0302 by Bob Rose RN  Outcome: Ongoing     Problem: Fluid Volume:  Goal: Will show no signs or symptoms of fluid imbalance  Description: Will show no signs or symptoms of fluid imbalance  12/21/2021 0302 by Bob Rose RN  Outcome: Ongoing     Problem: Fluid Volume:  Goal: Ability to achieve a balanced intake and output will improve  Description: Ability to achieve a balanced intake and output will improve  12/21/2021 0302 by Bob Rose RN  Outcome: Ongoing     Problem: Health Behavior:  Goal: Ability to manage health-related needs will improve  Description: Ability to manage health-related needs will improve  12/21/2021 0302 by Bob Rose RN  Outcome: Ongoing     Problem: Nutritional:  Goal: Maintenance of adequate nutrition will be supported  Description: Maintenance of adequate nutrition will be supported  12/21/2021 0302 by Bob Rose RN  Outcome: Ongoing     Problem: Physical Regulation:  Goal: Complications related to the disease process, condition or treatment will be avoided or minimized  Description: Complications related to the disease process, condition or treatment will be avoided or minimized  12/21/2021 0302 by Teresa Peguero RN  Outcome: Ongoing     Problem: Physical Regulation:  Goal: Ability to maintain clinical measurements within normal limits will improve  Description: Ability to maintain clinical measurements within normal limits will improve  12/21/2021 0302 by Teresa Peguero RN  Outcome: Ongoing     Problem: Physical Regulation:  Goal: Will show no signs and symptoms of electrolyte imbalance  Description: Will show no signs and symptoms of electrolyte imbalance  12/21/2021 0302 by Teresa Peguero RN  Outcome: Ongoing     Problem: Urinary Elimination:  Goal: Will remain free from infection  Description: Will remain free from infection  12/21/2021 0302 by Teresa Peguero RN  Outcome: Ongoing     Problem: Urinary Elimination:  Goal: Ability to achieve a balanced intake and output will improve  Description: Ability to achieve a balanced intake and output will improve  12/21/2021 0302 by Teresa Peguero RN  Outcome: Ongoing     Problem: Urinary Elimination:  Goal: Ability to achieve and maintain adequate urine output will be supported  Description: Ability to achieve and maintain adequate urine output will be supported  12/21/2021 0302 by Teresa Peguero RN  Outcome: Ongoing     Problem: Urinary Elimination:  Goal: Ability to recognize the need to void and respond appropriately will improve  Description: Ability to recognize the need to void and respond appropriately will improve  12/21/2021 0302 by Teresa Peguero RN  Outcome: Ongoing     Problem: Serum Glucose Level - Abnormal:  Goal: Ability to maintain appropriate glucose levels has stabilized  Description: Ability to maintain appropriate glucose levels has stabilized  12/21/2021 0302 by Sara Salvador

## 2021-12-21 NOTE — PROGRESS NOTES
Physician Progress Note      PATIENT:               Maurice Reid  CSN #:                  [id number]  :                       1995  ADMIT DATE:       2021 2:32 AM  DISCH DATE:  RESPONDING  PROVIDER #:        Brenden Marcial MD          QUERY TEXT:    Patient with endocarditis admitted with MONICA thought to be due to treatment   with Vancomycin. Noted documentation of \"admitted with endocarditis and DKA\"   per nephrology notes. Per attending progress notes, \"impending DKA\"   documented on  and \"impending DKA, resolved\" documented on . If possible, please document in progress notes and discharge summary if you   are evaluating and /or treating any of the following: The medical record reflects the following:  Risk Factors: recent DKA, endocarditis  Clinical Indicators: on admission glucose 862 with gap 14.0; on  glucose   513 with gap 17; on  glucose 159 with gap 12  Treatment: monitoring labs    Thank you,  Ning Rivas RN, BSN, CCDS  [email or web address]  Options provided:  -- DKA present on admission  -- DKA developed subsequent to admission  -- DKA present on admission and developed a second time subsequent to   admission  -- DKA was ruled out after study  -- Other - I will add my own diagnosis  -- Disagree - Not applicable / Not valid  -- Disagree - Clinically unable to determine / Unknown  -- Refer to Clinical Documentation Reviewer    PROVIDER RESPONSE TEXT:    DKA was present on admission.     Query created by: Shaylee Prasad on 2021 6:06 AM      Electronically signed by:  Brenden Marcial MD 2021 10:29 AM

## 2021-12-21 NOTE — PLAN OF CARE
Problem: Pain:  Description: Pain management should include both nonpharmacologic and pharmacologic interventions. Goal: Pain level will decrease  Description: Pain level will decrease  12/21/2021 0816 by Nile Cox RN  Outcome: Ongoing     Problem: Pain:  Description: Pain management should include both nonpharmacologic and pharmacologic interventions. Goal: Control of acute pain  Description: Control of acute pain  12/21/2021 0816 by Nile Cox RN  Outcome: Ongoing     Problem: Pain:  Description: Pain management should include both nonpharmacologic and pharmacologic interventions. Goal: Control of chronic pain  Description: Control of chronic pain  12/21/2021 0816 by Nile Cox RN  Outcome: Ongoing     Problem: Pain:  Description: Pain management should include both nonpharmacologic and pharmacologic interventions.   Goal: Patient's pain/discomfort is manageable  Description: Patient's pain/discomfort is manageable  12/21/2021 0816 by Nile Cox RN  Outcome: Ongoing     Problem: Skin Integrity:  Goal: Will show no infection signs and symptoms  Description: Will show no infection signs and symptoms  12/21/2021 0816 by Nile Cox RN  Outcome: Ongoing     Problem: Skin Integrity:  Goal: Absence of new skin breakdown  Description: Absence of new skin breakdown  12/21/2021 0816 by Nile Cox RN  Outcome: Ongoing

## 2021-12-22 LAB
24HR URINE VOLUME (ML): 3300 ML
A/G RATIO: 0.9 (ref 1.1–2.2)
ALBUMIN SERPL-MCNC: 2.5 G/DL (ref 3.4–5)
ALP BLD-CCNC: 80 U/L (ref 40–129)
ALT SERPL-CCNC: 8 U/L (ref 10–40)
ANION GAP SERPL CALCULATED.3IONS-SCNC: 18 MMOL/L (ref 3–16)
AST SERPL-CCNC: 42 U/L (ref 15–37)
BASOPHILS ABSOLUTE: 0.1 K/UL (ref 0–0.2)
BASOPHILS RELATIVE PERCENT: 1.6 %
BILIRUB SERPL-MCNC: <0.2 MG/DL (ref 0–1)
BUN BLDV-MCNC: 122 MG/DL (ref 7–20)
CALCIUM SERPL-MCNC: 8.2 MG/DL (ref 8.3–10.6)
CHLORIDE BLD-SCNC: 101 MMOL/L (ref 99–110)
CO2: 19 MMOL/L (ref 21–32)
CREAT SERPL-MCNC: 5.7 MG/DL (ref 0.9–1.3)
CREATININE 24 HOUR URINE: 0.8 G/24HR (ref 0.6–2.5)
EOSINOPHILS ABSOLUTE: 0.3 K/UL (ref 0–0.6)
EOSINOPHILS RELATIVE PERCENT: 4.3 %
GFR AFRICAN AMERICAN: 14
GFR NON-AFRICAN AMERICAN: 12
GLUCOSE BLD-MCNC: 162 MG/DL (ref 70–99)
GLUCOSE BLD-MCNC: 179 MG/DL (ref 70–99)
GLUCOSE BLD-MCNC: 280 MG/DL (ref 70–99)
GLUCOSE BLD-MCNC: 78 MG/DL (ref 70–99)
GLUCOSE BLD-MCNC: 91 MG/DL (ref 70–99)
HCT VFR BLD CALC: 22.6 % (ref 40.5–52.5)
HEMOGLOBIN: 7.7 G/DL (ref 13.5–17.5)
LYMPHOCYTES ABSOLUTE: 1.3 K/UL (ref 1–5.1)
LYMPHOCYTES RELATIVE PERCENT: 15.6 %
Lab: 24 HOURS
MAGNESIUM: 2.1 MG/DL (ref 1.8–2.4)
MCH RBC QN AUTO: 29.2 PG (ref 26–34)
MCHC RBC AUTO-ENTMCNC: 33.8 G/DL (ref 31–36)
MCV RBC AUTO: 86.4 FL (ref 80–100)
MICROALBUMIN 24H UR-MCNC: 3861 MG/DAY (ref 0–30)
MICROALBUMIN UR-MCNC: 2681.3 MCG/MIN (ref 0–20)
MONOCYTES ABSOLUTE: 0.9 K/UL (ref 0–1.3)
MONOCYTES RELATIVE PERCENT: 10.5 %
NEUTROPHILS ABSOLUTE: 5.5 K/UL (ref 1.7–7.7)
NEUTROPHILS RELATIVE PERCENT: 68 %
PDW BLD-RTO: 16.3 % (ref 12.4–15.4)
PERFORMED ON: ABNORMAL
PERFORMED ON: NORMAL
PLATELET # BLD: 452 K/UL (ref 135–450)
PMV BLD AUTO: 8.9 FL (ref 5–10.5)
POTASSIUM SERPL-SCNC: 4 MMOL/L (ref 3.5–5.1)
PROTEIN 24 HOUR URINE: 7.23 G/24HR
RBC # BLD: 2.62 M/UL (ref 4.2–5.9)
SODIUM BLD-SCNC: 138 MMOL/L (ref 136–145)
TOTAL PROTEIN: 5.3 G/DL (ref 6.4–8.2)
WBC # BLD: 8.1 K/UL (ref 4–11)

## 2021-12-22 PROCEDURE — 80053 COMPREHEN METABOLIC PANEL: CPT

## 2021-12-22 PROCEDURE — 6370000000 HC RX 637 (ALT 250 FOR IP): Performed by: INTERNAL MEDICINE

## 2021-12-22 PROCEDURE — 2580000003 HC RX 258: Performed by: INTERNAL MEDICINE

## 2021-12-22 PROCEDURE — 83735 ASSAY OF MAGNESIUM: CPT

## 2021-12-22 PROCEDURE — 6360000002 HC RX W HCPCS: Performed by: INTERNAL MEDICINE

## 2021-12-22 PROCEDURE — 36415 COLL VENOUS BLD VENIPUNCTURE: CPT

## 2021-12-22 PROCEDURE — 6370000000 HC RX 637 (ALT 250 FOR IP): Performed by: NURSE PRACTITIONER

## 2021-12-22 PROCEDURE — 6370000000 HC RX 637 (ALT 250 FOR IP): Performed by: STUDENT IN AN ORGANIZED HEALTH CARE EDUCATION/TRAINING PROGRAM

## 2021-12-22 PROCEDURE — 6360000002 HC RX W HCPCS: Performed by: STUDENT IN AN ORGANIZED HEALTH CARE EDUCATION/TRAINING PROGRAM

## 2021-12-22 PROCEDURE — 2060000000 HC ICU INTERMEDIATE R&B

## 2021-12-22 PROCEDURE — 94761 N-INVAS EAR/PLS OXIMETRY MLT: CPT

## 2021-12-22 PROCEDURE — 99233 SBSQ HOSP IP/OBS HIGH 50: CPT | Performed by: INTERNAL MEDICINE

## 2021-12-22 PROCEDURE — 85025 COMPLETE CBC W/AUTO DIFF WBC: CPT

## 2021-12-22 RX ORDER — METOLAZONE 5 MG/1
5 TABLET ORAL 2 TIMES DAILY
Status: DISCONTINUED | OUTPATIENT
Start: 2021-12-22 | End: 2021-12-23 | Stop reason: HOSPADM

## 2021-12-22 RX ORDER — SPIRONOLACTONE 25 MG/1
25 TABLET ORAL DAILY
Status: DISCONTINUED | OUTPATIENT
Start: 2021-12-22 | End: 2021-12-23 | Stop reason: HOSPADM

## 2021-12-22 RX ADMIN — PREGABALIN 200 MG: 100 CAPSULE ORAL at 08:34

## 2021-12-22 RX ADMIN — INSULIN LISPRO 5 UNITS: 100 INJECTION, SOLUTION INTRAVENOUS; SUBCUTANEOUS at 08:35

## 2021-12-22 RX ADMIN — QUETIAPINE FUMARATE 25 MG: 25 TABLET ORAL at 20:43

## 2021-12-22 RX ADMIN — FUROSEMIDE 80 MG: 10 INJECTION, SOLUTION INTRAMUSCULAR; INTRAVENOUS at 08:34

## 2021-12-22 RX ADMIN — FUROSEMIDE 80 MG: 10 INJECTION, SOLUTION INTRAMUSCULAR; INTRAVENOUS at 17:18

## 2021-12-22 RX ADMIN — INSULIN LISPRO 5 UNITS: 100 INJECTION, SOLUTION INTRAVENOUS; SUBCUTANEOUS at 11:55

## 2021-12-22 RX ADMIN — SODIUM BICARBONATE 1300 MG: 650 TABLET ORAL at 08:34

## 2021-12-22 RX ADMIN — HEPARIN SODIUM 5000 UNITS: 5000 INJECTION INTRAVENOUS; SUBCUTANEOUS at 05:51

## 2021-12-22 RX ADMIN — INSULIN LISPRO 2 UNITS: 100 INJECTION, SOLUTION INTRAVENOUS; SUBCUTANEOUS at 08:35

## 2021-12-22 RX ADMIN — INSULIN GLARGINE 20 UNITS: 100 INJECTION, SOLUTION SUBCUTANEOUS at 08:35

## 2021-12-22 RX ADMIN — PREGABALIN 200 MG: 100 CAPSULE ORAL at 20:43

## 2021-12-22 RX ADMIN — INSULIN LISPRO 8 UNITS: 100 INJECTION, SOLUTION INTRAVENOUS; SUBCUTANEOUS at 17:18

## 2021-12-22 RX ADMIN — CEFAZOLIN SODIUM 1000 MG: 1 INJECTION, POWDER, FOR SOLUTION INTRAMUSCULAR; INTRAVENOUS at 11:50

## 2021-12-22 RX ADMIN — METOLAZONE 5 MG: 5 TABLET ORAL at 08:34

## 2021-12-22 RX ADMIN — INSULIN LISPRO 6 UNITS: 100 INJECTION, SOLUTION INTRAVENOUS; SUBCUTANEOUS at 11:55

## 2021-12-22 RX ADMIN — SODIUM CHLORIDE, PRESERVATIVE FREE 10 ML: 5 INJECTION INTRAVENOUS at 20:43

## 2021-12-22 RX ADMIN — SODIUM CHLORIDE, PRESERVATIVE FREE 10 ML: 5 INJECTION INTRAVENOUS at 08:35

## 2021-12-22 RX ADMIN — SODIUM BICARBONATE 1300 MG: 650 TABLET ORAL at 20:43

## 2021-12-22 RX ADMIN — METOLAZONE 5 MG: 5 TABLET ORAL at 15:34

## 2021-12-22 RX ADMIN — AMLODIPINE BESYLATE 10 MG: 10 TABLET ORAL at 08:34

## 2021-12-22 RX ADMIN — SPIRONOLACTONE 25 MG: 25 TABLET ORAL at 11:50

## 2021-12-22 RX ADMIN — HYDRALAZINE HYDROCHLORIDE 5 MG: 20 INJECTION INTRAMUSCULAR; INTRAVENOUS at 04:11

## 2021-12-22 RX ADMIN — FLUOXETINE 20 MG: 20 CAPSULE ORAL at 08:34

## 2021-12-22 RX ADMIN — INSULIN LISPRO 2 UNITS: 100 INJECTION, SOLUTION INTRAVENOUS; SUBCUTANEOUS at 17:18

## 2021-12-22 RX ADMIN — SODIUM BICARBONATE 1300 MG: 650 TABLET ORAL at 13:53

## 2021-12-22 RX ADMIN — CEFAZOLIN SODIUM 1000 MG: 1 INJECTION, POWDER, FOR SOLUTION INTRAMUSCULAR; INTRAVENOUS at 16:14

## 2021-12-22 RX ADMIN — HEPARIN SODIUM 5000 UNITS: 5000 INJECTION INTRAVENOUS; SUBCUTANEOUS at 13:53

## 2021-12-22 ASSESSMENT — ENCOUNTER SYMPTOMS
WHEEZING: 0
EYE DISCHARGE: 0
CONSTIPATION: 0
COUGH: 0
SHORTNESS OF BREATH: 0
BACK PAIN: 0
TROUBLE SWALLOWING: 0
DIARRHEA: 0
SORE THROAT: 0
RHINORRHEA: 0
NAUSEA: 0
ABDOMINAL PAIN: 0
EYE REDNESS: 0

## 2021-12-22 ASSESSMENT — PAIN SCALES - GENERAL
PAINLEVEL_OUTOF10: 8
PAINLEVEL_OUTOF10: 0
PAINLEVEL_OUTOF10: 0

## 2021-12-22 NOTE — PROGRESS NOTES
[facility]                                               Progress note    Summary:   Melissa Wall is being seen by nephrology for MONICA on CKD stage 4. Admitted with endocarditis and DKA. Has known type 1 DM, complicated by CKD stage 4. Only recently moved here form [facility] [place] Lives closer to [place] with his cousin now. He was transferred from OSH to here. Interval History  Seen at bedside. SOB resolved. Edema improving on lasix IV 80 mg BID, Added metolazone 5 mg daily yesterday  Made 3200 mL urine yesterday. Weight still keeps going up despite a significant amount of urine output  Cr stable at 5.7  BP still slightly elevated. Plan:   - continue lasix to 80 mg BID. Increase metolazone to 5 mg BID  - start spironolactone 25 mg daily for sequential nephron blockade to facilitate good diuresis. - 1500 mL fluid restriction. 2g sodium restriction  - 24 hour urine protein, albumin, and creatinine complete, results pending  - continue sodium bicarb 1300 mg TID for acidosis  - daily standing weights. - says he wants to go home. He lives about 2 hours away from here and does not have a local nephrologist. We could discharge him on high dose PO diuretics but he will need close nephrology follow up for a safe discharge. Jun Snow MD  [facility]  Office: [phone]      Assessment:  Acute kidney injury  Multifactorial MONICA in the setting of endocarditis, antibiotics including vancomycin  Creatinine peaked at 4.8  Baseline creatinine 3.2  Has Farris's not obstructed.   random Vanco level of 24.6  Is significant CKD so low reserve for renal injury  He was hyperglycemic so may become hypovolemic  Clinically he has a lot of edema and profound hypoalbuminemia  Nephrotic range proteinuria in the setting of known type 1 diabetes  Baseline creatinine reported to be around 3.2, he was stage IV CKD already  Protein creatinine ratio 7.8 g urinalysis showed no blood 5-9 WBCs no casts, positive for glucose urea  Does have anemia but no thrombocytopenia to suggest TMA process. c3 and c4 normal.   DUTCH  ANCA  AntiGBM    Chronic kidney disease stage IV  Has diabetic nephropathy most likely in setting of longstanding proteinuria and uncontrolled diabetes  Was seeing a nephrologist in [place] and was told that he was stage IV  He has not been referred for transplant work-up or dialysis access as of now. Was having issues with drug use    Metabolic acidosis  Likely secondary to renal failure  Was on a bicarb drip  Had mild anion gap with hyperglycemia which improved with an insulin drip. Lactate negative at 0.9  Urine tox was negative    Generalized edema  EF 65% mild concentric hypertrophy  Has nephrotic range proteinuria and hypoalbuminemia  He has significant pitting lower extremity edema. Chest x-ray shows right-sided pleural effusion with adjacent right basilar consolidation    MSSA bacterial endocarditis  There is an aortic valve vegetation suspected on echo from [facility]  He is history receiving vancomycin infusions  He is now on cefazolin  Cardiology has seen and there is no plan for DIANA at this time    Type 1 diabetes  Complicated by renal failure  Blood glucose 862 on admission with anion gap 17  pH 7.3 bicarb 13 on blood gas  Treated with an insulin drip and has improved. HPI:  Yared Bernard is being seen by nephrology for MONICA on CKD stage 4. This is a 26 yo man with past medical history significant for type 1 diabetes, CKD stage IV for which she was following with a nephrologist in [place]. He moved here about 3 weeks ago to live with his cousin because he became homeless when he was in [place]. He does not have doctors in town yet. He was initially taken to [facility] emergency room for altered mental status and agitation. He was found to be hyperglycemic and there was concern for bacterial endocarditis.   Patient tells me that a couple years ago he had an issue with methamphetamine but no longer does this. He does smoke cigarettes, tobacco use currently. Denies IV drug use. He says that he has been having generalized edema for the past 2 months at least.  He was diagnosed with diabetes around 6years old has had diabetes for about 20 years. He denies hematuria but has seen very foamy frothy urine in the toilet. He does not use any NSAIDs. He was diagnosed with aortic valve endocarditis MSSA bacteria. He was post beginning IV vancomycin infusions. His baseline creatinine is around 3.2. Currently he is awake and alert in no acute distress. He has significant lower extremity edema 2+ pitting. No rashes or lesions. No petechia. ROS:   Positives Listed Bold. All other remaining systems are negative. Constitutional:  fever, chills, weakness, weight change, fatigue,      Skin:  rash, pruritus, hair loss, bruising, dry skin, petechiae. Head, Face, Neck   headaches, swelling,  cervical adenopathy. Respiratory: shortness of breath, cough, or wheezing  Cardiovascular: chest pain, palpitations, dizzy, edema  Gastrointestinal: nausea, vomiting, diarrhea, constipation,belly pain    Yellow skin, blood in stool  Musculoskeletal:  back pain, muscle weakness, gait problems,       joint pain or swelling. Genitourinary:  dysuria, poor urine flow, flank pain, blood in urine  Neurologic:  vertigo, TIA'S, syncope, seizures, focal weakness  Psychosocial:  insomnia, anxiety, or depression. Additional positive findings: -     PMH:   Past medical history, surgical history, social history, family history are reviewed and updated as appropriate. Reviewed current medication list.   Allergies reviewed and updated as needed. PE:   Vitals:    12/22/21 0847   BP:    Pulse:    Resp:    Temp:    SpO2: 94%       General appearance: white male in NAD, fully alert and oriented. Comfortable. HEENT: EOM intact, no icterus. Trachea is midline.    Neck : No masses, appears symmetrical, no JVD  Respiratory: Respiratory effort appears normal, bilateral equal chest rise, no wheeze, no crackles   Cardiovascular: Ausculation shows RRR ++ edema  Abdomen: No visible mass or tenderness, non distended. Musculoskeletal:  Joints with no swelling or deformity. Skin:no rashes, ulcers, induration, no jaundice. Neuro: face symmetric, no focal deficits. Appropriate responses.        Lab Results   Component Value Date    CREATININE 5.7 (HH) 12/22/2021     (HH) 12/22/2021     12/22/2021    K 4.0 12/22/2021     12/22/2021    CO2 19 (L) 12/22/2021      Lab Results   Component Value Date    WBC 8.1 12/22/2021    HGB 7.7 (L) 12/22/2021    HCT 22.6 (L) 12/22/2021    MCV 86.4 12/22/2021     (H) 12/22/2021     Lab Results   Component Value Date    CALCIUM 8.2 (L) 12/22/2021    PHOS 5.9 (H) 12/16/2021

## 2021-12-22 NOTE — PROGRESS NOTES
1210 Message out to infectious disease regarding future antibiotic use, with patient's IV leaking and being removed if there was a possible need for a temporary line. Waiting for response.     Electronically signed by Aric Mcgowan RN on 12/22/2021 at 1:37 PM

## 2021-12-22 NOTE — PROGRESS NOTES
Hospitalist  Progress Note       Marcello Quinteros is a 27 y.o. male   1995     SUBJECTIVE:   Patient seen and examined no specific complaints he feels better infectious disease and nephrology evaluation noted  OBJECTIVE:    Review of Systems:  General appearance: alert, appears stated age and cooperative  Skin: Skin color, texture, normal. No rashes or lesions  HEENT: No nose bleed, headache, vision problems  CV: C/O chest pain, tightness, pressure,   Respiratory: C/o no SOB, ALANIZ, Orthopnea, PND  GI: No abdominal pain, black stool, bloating  Limbs: No c/o edema, pain, swelling, intermittent claudication, joint pains  Neuro: No dizziness, lightheadedness, syncope, gait problems, memory problems  Psych: grossly normal. No SI/depression. Vitals:   Blood pressure (!) 167/84, pulse 95, temperature 98.5 °F (36.9 °C), temperature source Oral, resp. rate 16, height 5' 3\" (1.6 m), weight 150 lb 5.7 oz (68.2 kg), SpO2 94 %.     HEENT: AT, NC, PERRLA  Neck: No JVD  Heart: S1 S2 audible, no murmur   Lungs: CTA   Abdomen: Nontender   Limbs: No edema   CNS: no focal deficit      Past Medical History:   Diagnosis Date    [facility]     Foot drop, left foot     Kidney failure     STAGE 4    Neuropathy         Patient Active Problem List   Diagnosis    DKA, type 1, not at goal [facility])    Acute infective endocarditis    Acute kidney injury superimposed on chronic kidney disease ([facility]    Metabolic acidosis    Peripheral edema    CKD (chronic kidney disease)    DKA (diabetic ketoacidosis) (HCC)    Type 1 diabetes mellitus ([facility]    MSSA bacteremia    Poorly controlled type 2 diabetes mellitus ([facility] [facility]    History of intravenous drug use in remission    Diabetes education, encounter for        No Known Allergies     Current Inpatient Medications:    Current Facility-Administered Medications   Medication Dose Route Frequency Provider Last Rate Last Admin    metOLazone (ZAROXOLYN) tablet 5 mg  5 mg Oral Daily Doug LEYVA MD Rafa   5 mg at 12/22/21 0834    insulin lispro (HUMALOG) injection vial 5 Units  5 Units SubCUTAneous Daily with breakfast Fernie Krishnan MD   5 Units at 12/22/21 0835    insulin lispro (HUMALOG) injection vial 5 Units  5 Units SubCUTAneous Lunch Fernie Krishnan MD   5 Units at 12/21/21 1222    insulin lispro (HUMALOG) injection vial 8 Units  8 Units SubCUTAneous Perez Cortes MD   8 Units at 12/21/21 1747    amLODIPine (NORVASC) tablet 10 mg  10 mg Oral Daily Fernie Krishnan MD   10 mg at 12/22/21 5651    sodium bicarbonate tablet 1,300 mg  1,300 mg Oral TID Veronique Vazquez MD   1,300 mg at 12/22/21 0834    hydrALAZINE (APRESOLINE) injection 5 mg  5 mg IntraVENous Q6H PRN Fernie Krishnan MD   5 mg at 12/22/21 0411    insulin lispro (HUMALOG) injection vial 0-12 Units  0-12 Units SubCUTAneous TID WC Fernie Krishnan MD   2 Units at 12/22/21 0835    insulin lispro (HUMALOG) injection vial 0-6 Units  0-6 Units SubCUTAneous Nightly Fernie Krishnan MD        furosemide (LASIX) injection 80 mg  80 mg IntraVENous BID Veronique Vazquez MD   80 mg at 12/22/21 0834    pregabalin (LYRICA) capsule 200 mg  200 mg Oral BID LAVERNE Merino - CNP   200 mg at 12/22/21 0834    acetaminophen (TYLENOL) tablet 650 mg  650 mg Oral Q6H PRN Ted Tamez DO   650 mg at 12/17/21 2106    Or    acetaminophen (TYLENOL) suppository 650 mg  650 mg Rectal Q6H PRN Ted Tamez DO        heparin (porcine) injection 5,000 Units  5,000 Units SubCUTAneous 3 times per day Himanshu Osborn DO   5,000 Units at 12/22/21 0551    0.9 % sodium chloride infusion  25 mL IntraVENous PRN Katy Marx  mL/hr at 12/20/21 1248 25 mL at 12/20/21 1248    sodium chloride flush 0.9 % injection 5-40 mL  5-40 mL IntraVENous 2 times per day Katy Marx MD   10 mL at 12/22/21 0835    sodium chloride flush 0.9 % injection 5-40 mL  5-40 mL IntraVENous PRN William Alfaro, MD        glucose (GLUTOSE) 40 % oral gel 15 g  15 g Oral PRN [facility]        dextrose 50 % IV solution  12.5 g IntraVENous PRN [facility]        glucagon (rDNA) injection 1 mg  1 mg IntraMUSCular PRN Ted [facility]        dextrose 5 % solution  100 mL/hr IntraVENous PRN [facility]        QUEtiapine (SEROQUEL) tablet 25 mg  25 mg Oral Nightly [facility]   25 mg at 12/21/21 2120    FLUoxetine (PROZAC) capsule 20 mg  20 mg Oral Daily [facility]   20 mg at 12/22/21 0834    insulin glargine (LANTUS) injection vial 20 Units  20 Units SubCUTAneous Daily Velvet Murrieta MD   20 Units at 12/22/21 0835    ceFAZolin (ANCEF) 1,000 mg in dextrose 5 % 50 mL IVPB (mini-bag)  1,000 mg IntraVENous Q24H Antonina Boo MD   Stopped at 12/21/21 1252           Labs:  CBC with Differential:    Lab Results   Component Value Date    WBC 8.1 12/22/2021    RBC 2.62 12/22/2021    HGB 7.7 12/22/2021    HCT 22.6 12/22/2021     12/22/2021    MCV 86.4 12/22/2021    MCH 29.2 12/22/2021    MCHC 33.8 12/22/2021    RDW 16.3 12/22/2021    NRBC 0 12/15/2021    SEGSPCT 64.1 12/15/2021    LYMPHOPCT 15.6 12/22/2021    MONOPCT 10.5 12/22/2021    BASOPCT 1.6 12/22/2021    MONOSABS 0.9 12/22/2021    LYMPHSABS 1.3 12/22/2021    EOSABS 0.3 12/22/2021    BASOSABS 0.1 12/22/2021     CMP:    Lab Results   Component Value Date     12/22/2021    K 4.0 12/22/2021    K 4.3 12/20/2021     12/22/2021    CO2 19 12/22/2021     12/22/2021    CREATININE 5.7 12/22/2021    GFRAA 14 12/22/2021    AGRATIO 0.9 12/22/2021    LABGLOM 12 12/22/2021    LABGLOM 15 12/15/2021    GLUCOSE 78 12/22/2021    PROT 5.3 12/22/2021    LABALBU 2.5 12/22/2021    CALCIUM 8.2 12/22/2021    BILITOT <0.2 12/22/2021    ALKPHOS 80 12/22/2021    AST 42 12/22/2021    ALT 8 12/22/2021     Hepatic Function Panel:    Lab Results   Component Value Date    ALKPHOS 80 12/22/2021    ALT 8 12/22/2021    AST 42 12/22/2021    PROT 5.3 12/22/2021    BILITOT <0.2 12/22/2021    BILIDIR <0.2 12/14/2021    LABALBU 2.5 12/22/2021     Magnesium:    Lab Results   Component Value Date    MG 2.10 12/22/2021     PT/INR:  No results found for: PROTIME, INR  Last 3 Troponin:  No results found for: TROPONINI  U/A:    Lab Results   Component Value Date    COLORU YELLOW 12/19/2021    PHUR 7.0 12/19/2021    WBCUA 13 12/19/2021    RBCUA 2 12/19/2021    RBCUA 0-2 12/15/2021    YEAST NONE SEEN 12/15/2021    BACTERIA NONE SEEN 12/15/2021    CLARITYU Clear 12/19/2021    SPECGRAV 1.016 12/19/2021    LEUKOCYTESUR Negative 12/19/2021    UROBILINOGEN 0.2 12/19/2021    BILIRUBINUR Negative 12/19/2021    BLOODU SMALL 12/19/2021    GLUCOSEU >=1000 12/19/2021     ABG:  No results found for: PHART, BDI4GMP, PO2ART, DRK7WZU, BEART, THGBART, ULA9KTI, Z5MTKNJR  FLP:  No results found for: TRIG, HDL, LDLCALC, LDLDIRECT, LABVLDL  TSH:  No results found for: TSH   DATA:   ECG: Sinus Rhythm       ASSESSMENT:   1 MSSA aortic valve endocarditis  Nephrology recommendation following  2 acute diastolic heart failure volume overload from renal failure  Nephrology managing diuresis  3 acute on chronic kidney injury creatinine currently 5.7  4 DKA on admission resolved  Continue on sliding scale  5 elevated troponin secondary to renal failure  No Acs   6 hypertensive urgency  Blood pressure much better controlled  PLAN   Continue current treatment  Per ID and nephrology recommendation

## 2021-12-22 NOTE — CARE COORDINATION
[facility]  Diabetes Education   Progress Note       NAME:  Elissa Ku RECORD NUMBER:  [id number]  AGE: 27 y.o. GENDER: male  : 1995  TODAY'S DATE:  2021    Subjective   Reason for Diabetic Education Evaluation and Assessment: general diabetes support    Silverio Vail is hoping for discharge to home soon. Visit Type: follow-up      Yared Bernard is a 27 y.o. male referred by:     [x] Physician  [] Nursing  [] Chart Review   [] Other:     PAST MEDICAL HISTORY        Diagnosis Date    Diabetes ([facility]     Foot drop, left foot     Kidney failure     STAGE 4    Neuropathy        PAST SURGICAL HISTORY    No past surgical history on file. FAMILY HISTORY    No family history on file.     SOCIAL HISTORY    Social History     Tobacco Use    Smoking status: Current Some Day Smoker     Types: Cigarettes    Smokeless tobacco: Never Used   Vaping Use    Vaping Use: Every day   Substance Use Topics    Alcohol use: Never    Drug use: Yes     Types: Marijuana (Weed)       ALLERGIES    No Known Allergies    MEDICATIONS     metOLazone  5 mg Oral BID    spironolactone  25 mg Oral Daily    insulin lispro  5 Units SubCUTAneous Daily with breakfast    insulin lispro  5 Units SubCUTAneous Lunch    insulin lispro  8 Units SubCUTAneous Dinner    amLODIPine  10 mg Oral Daily    sodium bicarbonate  1,300 mg Oral TID    insulin lispro  0-12 Units SubCUTAneous TID WC    insulin lispro  0-6 Units SubCUTAneous Nightly    furosemide  80 mg IntraVENous BID    pregabalin  200 mg Oral BID    heparin (porcine)  5,000 Units SubCUTAneous 3 times per day    sodium chloride flush  5-40 mL IntraVENous 2 times per day    QUEtiapine  25 mg Oral Nightly    FLUoxetine  20 mg Oral Daily    insulin glargine  20 Units SubCUTAneous Daily    ceFAZolin  1,000 mg IntraVENous Q24H       Objective        Patient Active Problem List   Diagnosis Code    DKA, type 1, not at goal ([facility] E10.10  Acute infective endocarditis I33.0    Acute kidney injury superimposed on chronic kidney disease (HCC) N17.9, L29.3    Metabolic acidosis A68.6    Peripheral edema R60.9    CKD (chronic kidney disease) N18.9    DKA (diabetic ketoacidosis) ([facility]) E11.10    Type 1 diabetes mellitus ([facility] [facility] E10.9    MSSA bacteremia R78.81, B95.61    Poorly controlled type 2 diabetes mellitus ([facility] [facility] E11.65    History of intravenous drug use in remission Z87.898    Diabetes education, encounter for Z71.89        /76   Pulse 96   Temp 98.4 °F (36.9 °C) (Oral)   Resp 16   Ht 5' 3\" (1.6 m)   Wt 150 lb 5.7 oz (68.2 kg)   SpO2 95%   BMI 26.63 kg/m²     HgBA1c:    Lab Results   Component Value Date    LABA1C 12.1 12/17/2021       Recent Labs     12/21/21  1708 12/21/21  2027 12/22/21  0720 12/22/21  1148   POCGLU 217* 119* 162* 280*       BUN/Creatinine:    Lab Results   Component Value Date     12/22/2021    CREATININE 5.7 12/22/2021       Assessment        Diabetes Management and Education     Does the patient require new medication instruction? TBD - He is able to describe basal, carb ratios and correction factor dosing. Reviewed current insulin plan. Person responsible for administration of Insulin/Medication:        [x] Self     [] Caregiver       [] Spouse       [] Other Family Member   []  Other      Level of patient/caregiver understanding able to:       [x] Verbalized Understanding   [] Demonstrated Understanding       [] Teach Back       [] Needs Reinforcement     []  Other:        Does the patient/caregiver monitor Blood Glucoses? Yes - needs new glucometer. Reviewed glycemic control targets, testing frequency and when to call PCP. Level of patient/caregiver understanding able to:        [x] Verbalized Understanding   [] Demonstrated Understanding       [] Teach Back       [] Needs Reinforcement     []  Other:      Does the patient/caregiver follow a Meal Plan?  No:  Reports knowing how to carb count but has new restrictions. Reviewed low sodium guidelines including sodium free seasoning options. Level of patient/caregiver understanding able to:      [x] Verbalized Understanding   [] Demonstrated Understanding       [] Teach Back       [] Needs Reinforcement     []  Other:      Does the patient/caregiver understand S/S of Hypoglycemia? Yes  Reviewed symptoms, prevention and treatment. Level of patient/caregiver understanding able to:       [x] Verbalized Understanding   [] Demonstrated Understanding       [] Teach Back       [] Needs Reinforcement     [x]  Other:  Agrees to notify staff of any symptoms. Plan        Ongoing diabetes education and blood glucose monitoring. Social Service Consult Made:  Yes  Recommend meds to beds.       The following educational and support materials were provided:  · My contact information  · Sodium Free Seasoning                                          Discharge Plan:  Discharge needs: glucometer/strips/lancets       Teaching Time Diabetes Education:  20 minutes     Electronically signed by Rosa Farfan on 12/22/2021 at 3:57 PM

## 2021-12-22 NOTE — PROGRESS NOTES
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 Source: blood-Adult-suboptimal <5.5oz./set volume   Site: Peripheral Vein   Collected: 12/08/21   14:00   Current Antibiotics: not stated   Antibiotics comment:     --------------------------------------------------- C O M M E N T S   ----------------------------------------------------------------   .                           STATUS OF ORDERED AND REPORTED TESTS   BLOOD CULTURE                          FINAL           12/10/21       BLOOD CULTURE                                   FINAL         12/10/21 19:59       Organism  01  Staphylococcus aureus                  In the treatment of gram positive infections, GENTAMICIN                  should be CONSIDERED a SYNERGYSTIC agent ONLY.                Ciprofloxacin and Levofloxacin, regardless of in vitro                  sensitivity, should not be used for staphylococcal infections                  other than uncomplicated lower UTIs.                Moxifloxacin, regardless of in vitro sensitivity, should                  not be used for staphylococcal infections.      _____________________________________________________________________________   Organism        01-ramy     Antibiotic      PATSY  Intrp  *Adult dosage             Pk bld level Pk urine   lev.   _____________________________________________________________________________   Gentamicin     <=0.5   S   IV 1.7mg/kg q 8 h          5-7          >=100           Oxacillin       0.5    S   IV 500mg                   43                           Vancomycin       1     S   IV 1000mg q 12 h           20-50        800             Trimethoprim/Ruggiero <=10   S   GX422mlYTP/800mgSMX q 12h   1-2TMP/[id number]/97SM                              IV 160mgTMP/800mgSMX  q 8 h9TMP/105 SMX                 _____________________________________________________________________________   S=Susceptible I=Intermediate R=Resistant Susceptibility is determined by   comparing the PATSY of organism to the achievable   blood or urine level of drug. Level at the site of infection should be a   minimum of 5-10 times the PATSY.   _____________________________________________________________________________   PATSY and blood and urine levels=mcg/ml. *Standard dosages from [place] to   Antimicrobial Therapy and assumes moderate   infection in normal adult populations. For pts. with renal or liver disease   consult PDR or pharmacist.   _____________________________________________________________________________   [address]    Specimen Collected: 12/08/21  2:00 PM Last Resulted: 12/10/21  8:00 PM   Received From:  [address]  Result Received: 12/14/21  7:38 PM   View Encounter     Outside hospital records: 2D echo done on 12/9/2021      [address]  Outside Information        ECHOCARDIOGRAM    Anatomical Region Laterality Modality   -- -- Ultrasound       Narrative    Jakub Last,      12/9/2021  2:20 PM   [address] Echocardiography Report   Julio White is a 27 y.o. male     1995         MRN#  [id number]                  ORDERING PROVIDER:  No referring provider defined for this   encounter.      PROCEDURE: ECHOCARDIOGRAPHY with 2D, COLOR and SPECTRAL DOPPLER     Indications: Edema, Evaluate EF & Valves     Echo Tech:Montserrat Gann     Linear Measurement     Left Ventricular ID, Systole (LVIDs) (2.3-3.9cm)  2.44cm   Left Ventricular ID, Diastole (LVIDd) (3.5-5.6cm)  4.02cm   Interventricular Septal Wall (IVSD) (0.7-1.2cm)  1.19cm   Left Ventricular Posterior Wall (LVPWD) (0.7-1.2cm) 1.17cm   Left Atrium, Systole (LA) (1.9-4.0)    3.4cm   Aortic Root, Diastole (Ao) (1.6-2.6cm)   2.7cm     LV outflow tract (LVOT)     1.9cm   Ejection Fraction      70%   Fractional Shortening      39%     Doppler Data     LVOT velocity (VI)      149cm/s   Peak Gradient  (LVOT)     9mmHg   Velocity in the Aorta (V2)     210cm/s   Peak Gradient  (Aorta)     18mmHg   Mean Gradient (Aorta)     7mmHg   Aortic Valve Area (DANE)     2.20cm2   Mitral Valve half time (t1/2)     74ms   Mitral Valve area (MVA)     2.97 cm2   Tricuspid valve regurgitate velocity (m/s)   274cm/s   Right Ventricular Systolic Pressure(mm Hg)   40mmHg   TV Peak Velocity      82cm/s   PV Peak Velocity      171cm/s     Vitals:    12/09/21 0700 12/09/21 0713 12/09/21 0800 12/09/21 1001   BP: 132/74 135/76 133/77 133/77   Pulse: 87 86 84     Resp: 16 18 18     Temp:   97 degrees F (36.1 degrees C)     TempSrc:   Axillary     SpO2: 98% 96% 97%     Weight:    54.9 kg (121 lb)   Height:    1.6 m (5' 3\")             STAFF ENTERING DATA: Susan Callaway 12/9/2021           2D Impression     Technical quality: Good       Left ventricle: Normal internal dimensions with mild concentric   hypertrophy   The estimated LVEF is 65%     Left atrium: Normal size   Right ventricle: Normal size and function   Right atrium: Normal size   Aortic root: Normal   Pericardium: Real circumferential pericardial effusion   Mitral valve: No prolapse   Aortic valve: Trileaflet   Tricuspid valve: Normal   Pulmonic valve: Poorly visualized         Doppler Impression ( If Performed )   Mitral valve: Trace regurgitation   Tricuspid valve: Trace -mild regurgitation   Aortic valve:  Normal color flow   Pulmonic valve:  Normal Color Flow   Left ventricular diastolic function is normal for age       Conclusion:    Technically satisfactory Echocardiography    Normal Left Ventricular size and function, diastolic function   is normal    Normal Aortic Root    No intracardiac masses or thrombi    Mild concentric left ventricular hypertrophy noted.  Normal Right ventricle Size and function.  Left ventricular ejection fraction est 65%    Normal Mitral Valve     Aortic valve. with small echogenic structure on the ventricular   surface, suspicious for vegetation    Normal Tricuspid valve.  Trace mitral regurgitation    Trace to mild tricuspid regurgitation    Normal color flow across Aortic and pulmonic valve    Normal left Ventricular Diastolic function.  Trivial circumferential pericardial effusion noted without   tamponade physiology    Estimated right ventricular systolic pressure 40 mmHg    Normal LA Size     Clinical correlation needed with respect to the aortic valve. Electronically signed by Kristal Floyd DO on 12/9/2021 at   10:46 AM.    All Measurements                Performed Procedures     FL ECHOCARDIOGRAM W/O 3D    Exam End: 12/09/21 10:01 AM Last Resulted: 12/09/21 10:02 AM   Received From: [facility]  Result Received: 12/16/21  2:32 AM            Antibiotics and immunizations:       Current antibiotics: All antibiotics and their doses were reviewed by me    Recent Abx Admin                   ceFAZolin (ANCEF) 1,000 mg in dextrose 5 % 50 mL IVPB (mini-bag) (mg) 1,000 mg New Bag 12/22/21 1614     1,000 mg New Bag  1150                  Immunization History: All immunization history was reviewed by me today.       There is no immunization history on file for this patient. Known drug allergies: All allergies were reviewed and updated    No Known Allergies    Social history:     Social History:  All social andepidemiologic history was reviewed and updated by me today as needed. · Tobacco use:   reports that he has been smoking cigarettes. He has never used smokeless tobacco.  · Alcohol use:   reports no history of alcohol use. · Currently lives [place] [address]  ·  reports current drug use. Drug: Marijuana [place]). COVID VACCINATION AND LAB RESULT RECORDS:     Internal Administration   First Dose      Second Dose           Last COVID Lab SARS-CoV-2, NAAT (no units)   Date Value   12/19/2021 Not Detected            Assessment:     The patient is a 27 y.o. old male who  has a past medical history of Diabetes ([address] Foot drop, left foot, Kidney failure, and Neuropathy. 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 He is tolerating antibiotics okay. He is afebrile. Nephrology has given the okay with tunneled central line placement      Plan:     Diagnostic Workup:      · Continue to follow  fever curve, WBC count and blood cultures. · Continue to monitor blood counts, liver and renal function.     Antimicrobials:    · Will continue IV cefazolin 1 g every 24 hours  · Continue to monitor his vitals closely  · If the renal function stays at current levels, will continue the same dose of IV cefazolin until January 10 and tentative plan in that case will be as follows  · Continue oral probiotic twice daily while on antibiotic  · Continue close vitals monitoring. · Maintain good glycemic control. · Fall precautions. · Aspiration precautions. · Continue to watch for new fever or diarrhea. · DVT prophylaxis. · Discussed all above with patient and RN. MATT has been updated with infusion orders as follows: Out-patient antibiotic therapy (OPAT)/ Antibiotic Infusion orders          Diagnosis: MSSA bacteremia and endocarditis     Organism/ culture: See above     Name and dose of Antimicrobial: IV cefazolin 1 g every 24 hour     Antimicrobial start date: calculated from 12/22/2021     Antimicrobial completion date planned: January 10, 2021     Lab monitoring: CBC, Chem 12 once a week, to be       collected every Monday or Tuesday morning until the patient is off IV  antibiotics. Fax weekly lab results to Sheila Briceño MD's office at        [phone]. Please maintain tunneled central lineuntil the patient is on IV antibiotics. Ok to administer  normal saline flushes via tunneled central line as needed. ** Please notify Sheila Briceño MD's office with any change in patient's        status, transfer out of facility or to hospital by calling [phone]           Outpatient Follow up: No outpatient ID f/u needed if continues to do well. Due to COVID 19 pandemic, if needed, a f/u video visit can be arranged via my office at patient's request on as-needed basis. Physician Signature:  Electronically signed by Sheila Briceño MD on                                                      12/22/21 at 5:52 PM EST             Drug Monitoring:    · Continue monitoring for antibiotic toxicity as follows: CBC, CMP   · Continue to watch for following: new or worsening fever, new hypotension, hives, lip swelling and redness or purulence at vascular access sites.      I/v access Management:    · Continue to monitor i.v access sites for erythema, induration, discharge or tenderness. · As always, continue efforts to minimize tubes/lines/drains as clinically appropriate to reduce chances of line associated infections. Patient education and counseling:        · The patient was educated in detail about the side-effects of various antibiotics and things to watch for like new rashes, lip swelling, severe reaction, worsening diarrhea, break through fever etc.  · Discussed patient's condition and what to expect. All of the patient's questions were addressed in a satisfactory manner and patient verbalized understanding all instructions. Weight loss counseling:    Extensive weight loss counseling was done. It is important to set a realistic weight loss goal. First goal should be to avoid gaining more weight and staying at current weight (or within 5 percent). People at high risk of developing diabetes who are able to lose 5 percent of their body weight and maintain this weight will reduce their risk of developing diabetes by about 50 percent and reduce their blood pressure. Losing more than 15 percent of  body weight and staying at this weight is an extremely good result, even if you never reach your \"dream\" or \"ideal\" weight. Lifestyle changes including changing eating habits, substituting excess carbohydrates with proteins, stress reduction, using self-help programs like Weight Watchers®, Overeaters Anonymous®, and Take Off Pounds Sensibly (TOPS)© , following DASH diet and increasing exercise or walking briskly daily for half hour to and hour 5-7 days a week was suggested among other measures.  Information was given about various weight loss education programs and their websites like www.cdc.gov/healthyweight, www.choosemyplate.gov and www.health.gov/dietaryguidelines/        Level of complexity of visit: High     TIME SPENT TODAY:     - Spent over  37 minutes on visit (including interval history, physical exam, review of data including labs, cultures, imaging, development and implementation of treatment plan and coordination of complex care). More than 50 percent of this includes face-to-face time spent with the patient for counseling and coordination of care. Thanks for allowing me to participate in your patient's care. I will sign off today, but will be available to answer any further questions or concerns that may arise during patient's stay in the hospital.          Subjective: Interval history: Interval history was obtained from chart review and patient/ RN. He is afebrile. He is tolerating antibiotics okay. No diarrhea     REVIEW OF SYSTEMS:     Review of Systems   Constitutional: Negative for chills, diaphoresis and fever. HENT: Negative for ear discharge, ear pain, rhinorrhea, sore throat and trouble swallowing. Eyes: Negative for discharge and redness. Respiratory: Negative for cough, shortness of breath and wheezing. Cardiovascular: Negative for chest pain and leg swelling. Gastrointestinal: Negative for abdominal pain, constipation, diarrhea and nausea. Endocrine: Negative for polyuria. Genitourinary: Negative for dysuria, flank pain, frequency, hematuria and urgency. Musculoskeletal: Negative for back pain and myalgias. Skin: Negative for rash. Neurological: Negative for dizziness, seizures and headaches. Hematological: Does not bruise/bleed easily. Psychiatric/Behavioral: Negative for hallucinations and suicidal ideas. All other systems reviewed and are negative. Past Medical History: All past medical history reviewed today. Past Medical History:   Diagnosis Date    Diabetes [address] [address]     Foot drop, left foot     Kidney failure     STAGE 4    Neuropathy        Past Surgical History: All past surgical history was reviewed today. No past surgical history on file. Family History: All family history was reviewed today. No family history on file.     Objective:       PHYSICAL EXAM:      Vitals:   Vitals:    12/22/21 0847 12/22/21 1118 12/22/21 1200 12/22/21 1643   BP:  133/76  (!) 155/78   Pulse:  96  91   Resp:  16  16   Temp:  98.4 °F (36.9 °C)  97.8 °F (36.6 °C)   TempSrc:  Oral  Oral   SpO2: 94% 95%  94%   Weight:       Height:   5' 3\" (1.6 m)        Physical Exam  Vitals and nursing note reviewed. Constitutional:       Appearance: Normal appearance. He is well-developed. HENT:      Head: Normocephalic and atraumatic. Right Ear: External ear normal.      Left Ear: External ear normal.      Nose: Nose normal. No congestion or rhinorrhea. Mouth/Throat:      Mouth: Mucous membranes are moist.      Pharynx: No oropharyngeal exudate or posterior oropharyngeal erythema. Eyes:      General: No scleral icterus. Right eye: No discharge. Left eye: No discharge. Conjunctiva/sclera: Conjunctivae normal.      Pupils: Pupils are equal, round, and reactive to light. Cardiovascular:      Rate and Rhythm: Normal rate and regular rhythm. Pulses: Normal pulses. Heart sounds: No murmur heard. No friction rub. Pulmonary:      Effort: Pulmonary effort is normal. No respiratory distress. Breath sounds: Normal breath sounds. No stridor. No wheezing, rhonchi or rales. Abdominal:      General: Bowel sounds are normal.      Palpations: Abdomen is soft. Tenderness: There is no abdominal tenderness. There is no right CVA tenderness, left CVA tenderness, guarding or rebound. Musculoskeletal:         General: No swelling or tenderness. Normal range of motion. Cervical back: Normal range of motion and neck supple. No rigidity. No muscular tenderness. Lymphadenopathy:      Cervical: No cervical adenopathy. Skin:     General: Skin is warm and dry. Coloration: Skin is not jaundiced. Findings: No erythema or rash. Neurological:      General: No focal deficit present. Mental Status: He is alert and oriented to person, place, and time. Mental status is at baseline.       Motor: No abnormal muscle tone. Psychiatric:         Mood and Affect: Mood normal.         Behavior: Behavior normal.         Thought Content: Thought content normal.             *      Lines and drains: All vascular access sites are healthy with no local erythema, discharge or tenderness. Intake and output:    I/O last 3 completed shifts: In: 5 [P.O.:1380; IV Piggyback:144]  Out: 4400 [Urine:4400]    Lab Data:   All available labs and old records have been reviewed by me. CBC:  Recent Labs     12/20/21  0334 12/21/21  0445 12/22/21  0421   WBC 7.0 7.1 8.1   RBC 2.67* 2.60* 2.62*   HGB 7.7* 7.6* 7.7*   HCT 23.2* 22.7* 22.6*    427 452*   MCV 86.8 87.4 86.4   MCH 28.9 29.1 29.2   MCHC 33.3 33.3 33.8   RDW 16.3* 16.5* 16.3*        BMP:  Recent Labs     12/20/21  1117 12/21/21  0445 12/22/21  0421    134* 138   K 4.3 4.7 4.0    102 101   CO2 18* 17* 19*   * 115* 122*   CREATININE 5.3* 5.7* 5.7*   CALCIUM 7.9* 7.8* 8.2*   GLUCOSE 272* 275* 78        Hepatic Function Panel:   Lab Results   Component Value Date    ALKPHOS 80 12/22/2021    ALT 8 12/22/2021    AST 42 12/22/2021    PROT 5.3 12/22/2021    BILITOT <0.2 12/22/2021    BILIDIR <0.2 12/14/2021    LABALBU 2.5 12/22/2021       CPK: No results found for: CKTOTAL  ESR:   Lab Results   Component Value Date    SEDRATE 106 (H) 12/16/2021     CRP:   Lab Results   Component Value Date    CRP 13.2 (H) 12/16/2021           Imaging: All pertinent images and reports for the current visit were reviewed by me during this visit. VL Extremity Venous Left   Final Result      XR CHEST PORTABLE   Final Result   Right-sided pleural effusion with adjacent right basilar consolidation,   either atelectasis or pneumonia. IR TUNNELED CVC PLACE WO SQ PORT/PUMP > 5 YEARS    (Results Pending)       Medications: All current and past medications were reviewed.      metOLazone  5 mg Oral BID    spironolactone  25 mg Oral Daily    insulin lispro  5 Units SubCUTAneous Daily with breakfast    insulin lispro  5 Units SubCUTAneous Lunch    insulin lispro  8 Units SubCUTAneous Dinner    amLODIPine  10 mg Oral Daily    sodium bicarbonate  1,300 mg Oral TID    insulin lispro  0-12 Units SubCUTAneous TID WC    insulin lispro  0-6 Units SubCUTAneous Nightly    furosemide  80 mg IntraVENous BID    pregabalin  200 mg Oral BID    heparin (porcine)  5,000 Units SubCUTAneous 3 times per day    sodium chloride flush  5-40 mL IntraVENous 2 times per day    QUEtiapine  25 mg Oral Nightly    FLUoxetine  20 mg Oral Daily    insulin glargine  20 Units SubCUTAneous Daily    ceFAZolin  1,000 mg IntraVENous Q24H        sodium chloride 25 mL (12/20/21 1248)    dextrose         hydrALAZINE, acetaminophen **OR** acetaminophen, sodium chloride, sodium chloride flush, glucose, dextrose, glucagon (rDNA), dextrose      Problem list:       Patient Active Problem List   Diagnosis Code    DKA, type 1, not at goal [facility]) E10.10    Acute infective endocarditis I33.0    Acute kidney injury superimposed on chronic kidney disease ([facility] [address] N17.9, O60.7    Metabolic acidosis D43.6    Peripheral edema R60.9    CKD (chronic kidney disease) N18.9    DKA (diabetic ketoacidosis) ([facility] [address] E11.10    Type 1 diabetes mellitus ([facility] [address] E10.9    MSSA bacteremia R78.81, B95.61    Poorly controlled type 2 diabetes mellitus ([facility] [address] E11.65    History of intravenous drug use in remission Z87.898    Diabetes education, encounter for Z71.89       Please note that this chart was generated using Dragon dictation software. Although every effort was made to ensure the accuracy of this automated transcription, some errors in transcription may have occurred inadvertently. If you may need any clarification, please do not hesitate to contact me through EPIC or at the phone number provided below with my electronic signature.   Any pictures or media included in this note were obtained after taking informed

## 2021-12-22 NOTE — PLAN OF CARE
Problem: Pain:  Description: Pain management should include both nonpharmacologic and pharmacologic interventions. Goal: Pain level will decrease  Description: Pain level will decrease  Outcome: Ongoing  Goal: Control of acute pain  Description: Control of acute pain  Outcome: Ongoing  Goal: Control of chronic pain  Description: Control of chronic pain  Outcome: Ongoing  Goal: Patient's pain/discomfort is manageable  Description: Patient's pain/discomfort is manageable  Outcome: Ongoing     Problem: Skin Integrity:  Goal: Will show no infection signs and symptoms  Description: Will show no infection signs and symptoms  Outcome: Ongoing  Goal: Absence of new skin breakdown  Description: Absence of new skin breakdown  Outcome: Ongoing     Problem: Falls - Risk of:  Goal: Will remain free from falls  Description: Will remain free from falls  Outcome: Ongoing  Goal: Absence of physical injury  Description: Absence of physical injury  Outcome: Ongoing     Problem: Infection:  Goal: Will remain free from infection  Description: Will remain free from infection  Outcome: Ongoing     Problem: Safety:  Goal: Free from accidental physical injury  Description: Free from accidental physical injury  Outcome: Ongoing  Goal: Free from intentional harm  Description: Free from intentional harm  Outcome: Ongoing     Problem: Daily Care:  Goal: Daily care needs are met  Description: Daily care needs are met  Outcome: Ongoing     Problem: Skin Integrity:  Goal: Skin integrity will stabilize  Description: Skin integrity will stabilize  Outcome: Ongoing     Problem: Discharge Planning:  Goal: Patients continuum of care needs are met  Description: Patients continuum of care needs are met  Outcome: Ongoing     Problem:  Activity:  Goal: Risk for activity intolerance will decrease  Description: Risk for activity intolerance will decrease  Outcome: Ongoing     Problem: Coping:  Goal: Ability to identify and develop effective coping behavior will improve  Description: Ability to identify and develop effective coping behavior will improve  Outcome: Ongoing  Goal: Expressions of feelings of enhanced comfort will increase  Description: Expressions of feelings of enhanced comfort will increase  Outcome: Ongoing     Problem: Fluid Volume:  Goal: Will show no signs or symptoms of fluid imbalance  Description: Will show no signs or symptoms of fluid imbalance  Outcome: Ongoing  Goal: Ability to achieve a balanced intake and output will improve  Description: Ability to achieve a balanced intake and output will improve  Outcome: Ongoing     Problem: Health Behavior:  Goal: Ability to manage health-related needs will improve  Description: Ability to manage health-related needs will improve  Outcome: Ongoing     Problem: Nutritional:  Goal: Maintenance of adequate nutrition will be supported  Description: Maintenance of adequate nutrition will be supported  Outcome: Ongoing     Problem: Physical Regulation:  Goal: Complications related to the disease process, condition or treatment will be avoided or minimized  Description: Complications related to the disease process, condition or treatment will be avoided or minimized  Outcome: Ongoing  Goal: Ability to maintain clinical measurements within normal limits will improve  Description: Ability to maintain clinical measurements within normal limits will improve  Outcome: Ongoing  Goal: Will show no signs and symptoms of electrolyte imbalance  Description: Will show no signs and symptoms of electrolyte imbalance  Outcome: Ongoing     Problem: Urinary Elimination:  Goal: Will remain free from infection  Description: Will remain free from infection  Outcome: Ongoing  Goal: Ability to achieve a balanced intake and output will improve  Description: Ability to achieve a balanced intake and output will improve  Outcome: Ongoing  Goal: Ability to achieve and maintain adequate urine output will be supported  Description: Ability to achieve and maintain adequate urine output will be supported  Outcome: Ongoing  Goal: Ability to recognize the need to void and respond appropriately will improve  Description: Ability to recognize the need to void and respond appropriately will improve  Outcome: Ongoing     Problem: Serum Glucose Level - Abnormal:  Goal: Ability to maintain appropriate glucose levels has stabilized  Description: Ability to maintain appropriate glucose levels has stabilized  Outcome: Ongoing

## 2021-12-23 ENCOUNTER — APPOINTMENT (OUTPATIENT)
Dept: INTERVENTIONAL RADIOLOGY/VASCULAR | Age: 26
DRG: 469 | End: 2021-12-23
Attending: INTERNAL MEDICINE
Payer: MEDICARE

## 2021-12-23 VITALS
OXYGEN SATURATION: 95 % | WEIGHT: 140.65 LBS | TEMPERATURE: 98.1 F | HEART RATE: 90 BPM | RESPIRATION RATE: 20 BRPM | BODY MASS INDEX: 24.92 KG/M2 | HEIGHT: 63 IN | SYSTOLIC BLOOD PRESSURE: 164 MMHG | DIASTOLIC BLOOD PRESSURE: 83 MMHG

## 2021-12-23 LAB
A/G RATIO: 0.8 (ref 1.1–2.2)
ALBUMIN SERPL-MCNC: 2.4 G/DL (ref 3.4–5)
ALP BLD-CCNC: 77 U/L (ref 40–129)
ALT SERPL-CCNC: 7 U/L (ref 10–40)
ANION GAP SERPL CALCULATED.3IONS-SCNC: 15 MMOL/L (ref 3–16)
AST SERPL-CCNC: 36 U/L (ref 15–37)
BASOPHILS ABSOLUTE: 0.2 K/UL (ref 0–0.2)
BASOPHILS RELATIVE PERCENT: 2.2 %
BILIRUB SERPL-MCNC: <0.2 MG/DL (ref 0–1)
BUN BLDV-MCNC: 122 MG/DL (ref 7–20)
CALCIUM SERPL-MCNC: 8.6 MG/DL (ref 8.3–10.6)
CHLORIDE BLD-SCNC: 100 MMOL/L (ref 99–110)
CO2: 21 MMOL/L (ref 21–32)
CREAT SERPL-MCNC: 5.6 MG/DL (ref 0.9–1.3)
EOSINOPHILS ABSOLUTE: 0.3 K/UL (ref 0–0.6)
EOSINOPHILS RELATIVE PERCENT: 3.3 %
GFR AFRICAN AMERICAN: 15
GFR NON-AFRICAN AMERICAN: 12
GLUCOSE BLD-MCNC: 102 MG/DL (ref 70–99)
GLUCOSE BLD-MCNC: 146 MG/DL (ref 70–99)
GLUCOSE BLD-MCNC: 158 MG/DL (ref 70–99)
GLUCOSE BLD-MCNC: 207 MG/DL (ref 70–99)
GLUCOSE BLD-MCNC: 65 MG/DL (ref 70–99)
HCT VFR BLD CALC: 23.6 % (ref 40.5–52.5)
HEMOGLOBIN: 7.7 G/DL (ref 13.5–17.5)
INR BLD: 0.88 (ref 0.88–1.12)
LYMPHOCYTES ABSOLUTE: 1 K/UL (ref 1–5.1)
LYMPHOCYTES RELATIVE PERCENT: 13.1 %
MAGNESIUM: 2.3 MG/DL (ref 1.8–2.4)
MCH RBC QN AUTO: 28.4 PG (ref 26–34)
MCHC RBC AUTO-ENTMCNC: 32.5 G/DL (ref 31–36)
MCV RBC AUTO: 87.5 FL (ref 80–100)
MONOCYTES ABSOLUTE: 0.7 K/UL (ref 0–1.3)
MONOCYTES RELATIVE PERCENT: 9 %
NEUTROPHILS ABSOLUTE: 5.6 K/UL (ref 1.7–7.7)
NEUTROPHILS RELATIVE PERCENT: 72.4 %
PDW BLD-RTO: 16.5 % (ref 12.4–15.4)
PERFORMED ON: ABNORMAL
PLATELET # BLD: 420 K/UL (ref 135–450)
PMV BLD AUTO: 9.4 FL (ref 5–10.5)
POTASSIUM SERPL-SCNC: 4.4 MMOL/L (ref 3.5–5.1)
PROTHROMBIN TIME: 9.9 SEC (ref 9.9–12.7)
RBC # BLD: 2.7 M/UL (ref 4.2–5.9)
SODIUM BLD-SCNC: 136 MMOL/L (ref 136–145)
TOTAL PROTEIN: 5.5 G/DL (ref 6.4–8.2)
WBC # BLD: 7.7 K/UL (ref 4–11)

## 2021-12-23 PROCEDURE — 77001 FLUOROGUIDE FOR VEIN DEVICE: CPT

## 2021-12-23 PROCEDURE — 6370000000 HC RX 637 (ALT 250 FOR IP): Performed by: INTERNAL MEDICINE

## 2021-12-23 PROCEDURE — 6370000000 HC RX 637 (ALT 250 FOR IP): Performed by: NURSE PRACTITIONER

## 2021-12-23 PROCEDURE — 6360000002 HC RX W HCPCS: Performed by: STUDENT IN AN ORGANIZED HEALTH CARE EDUCATION/TRAINING PROGRAM

## 2021-12-23 PROCEDURE — 6370000000 HC RX 637 (ALT 250 FOR IP): Performed by: STUDENT IN AN ORGANIZED HEALTH CARE EDUCATION/TRAINING PROGRAM

## 2021-12-23 PROCEDURE — 2709999900 IR TUNNELED CVC PLACE WO SQ PORT/PUMP > 5 YEARS

## 2021-12-23 PROCEDURE — 2580000003 HC RX 258: Performed by: INTERNAL MEDICINE

## 2021-12-23 PROCEDURE — 85025 COMPLETE CBC W/AUTO DIFF WBC: CPT

## 2021-12-23 PROCEDURE — 02H633Z INSERTION OF INFUSION DEVICE INTO RIGHT ATRIUM, PERCUTANEOUS APPROACH: ICD-10-PCS | Performed by: STUDENT IN AN ORGANIZED HEALTH CARE EDUCATION/TRAINING PROGRAM

## 2021-12-23 PROCEDURE — 94761 N-INVAS EAR/PLS OXIMETRY MLT: CPT

## 2021-12-23 PROCEDURE — 6360000002 HC RX W HCPCS: Performed by: INTERNAL MEDICINE

## 2021-12-23 PROCEDURE — 76937 US GUIDE VASCULAR ACCESS: CPT

## 2021-12-23 PROCEDURE — 80053 COMPREHEN METABOLIC PANEL: CPT

## 2021-12-23 PROCEDURE — 0JH63XZ INSERTION OF TUNNELED VASCULAR ACCESS DEVICE INTO CHEST SUBCUTANEOUS TISSUE AND FASCIA, PERCUTANEOUS APPROACH: ICD-10-PCS | Performed by: STUDENT IN AN ORGANIZED HEALTH CARE EDUCATION/TRAINING PROGRAM

## 2021-12-23 PROCEDURE — 85610 PROTHROMBIN TIME: CPT

## 2021-12-23 PROCEDURE — 36558 INSERT TUNNELED CV CATH: CPT

## 2021-12-23 PROCEDURE — 36415 COLL VENOUS BLD VENIPUNCTURE: CPT

## 2021-12-23 PROCEDURE — 83735 ASSAY OF MAGNESIUM: CPT

## 2021-12-23 RX ORDER — SODIUM BICARBONATE 650 MG/1
1300 TABLET ORAL 3 TIMES DAILY
Qty: 90 TABLET | Refills: 1 | Status: SHIPPED | OUTPATIENT
Start: 2021-12-23

## 2021-12-23 RX ORDER — METOLAZONE 5 MG/1
5 TABLET ORAL 2 TIMES DAILY
Qty: 60 TABLET | Refills: 1 | Status: SHIPPED | OUTPATIENT
Start: 2021-12-23

## 2021-12-23 RX ORDER — AMLODIPINE BESYLATE 10 MG/1
10 TABLET ORAL DAILY
Qty: 30 TABLET | Refills: 3 | Status: SHIPPED | OUTPATIENT
Start: 2021-12-24

## 2021-12-23 RX ORDER — CALCIUM CITRATE/VITAMIN D3 200MG-6.25
1 TABLET ORAL
Qty: 200 EACH | Refills: 3 | Status: SHIPPED | OUTPATIENT
Start: 2021-12-23 | End: 2022-04-14

## 2021-12-23 RX ORDER — TORSEMIDE 100 MG/1
100 TABLET ORAL DAILY
Status: DISCONTINUED | OUTPATIENT
Start: 2021-12-23 | End: 2021-12-23 | Stop reason: HOSPADM

## 2021-12-23 RX ORDER — TORSEMIDE 100 MG/1
100 TABLET ORAL DAILY
Qty: 30 TABLET | Refills: 3 | Status: SHIPPED | OUTPATIENT
Start: 2021-12-23

## 2021-12-23 RX ORDER — LANCETS 30 GAUGE
1 EACH MISCELLANEOUS 4 TIMES DAILY
Qty: 200 EACH | Refills: 0 | Status: SHIPPED | OUTPATIENT
Start: 2021-12-23 | End: 2022-04-14

## 2021-12-23 RX ORDER — SPIRONOLACTONE 25 MG/1
25 TABLET ORAL DAILY
Qty: 30 TABLET | Refills: 3 | Status: SHIPPED | OUTPATIENT
Start: 2021-12-24 | End: 2022-01-21

## 2021-12-23 RX ADMIN — SPIRONOLACTONE 25 MG: 25 TABLET ORAL at 08:08

## 2021-12-23 RX ADMIN — AMLODIPINE BESYLATE 10 MG: 10 TABLET ORAL at 08:08

## 2021-12-23 RX ADMIN — CEFAZOLIN SODIUM 1000 MG: 1 INJECTION, POWDER, FOR SOLUTION INTRAMUSCULAR; INTRAVENOUS at 12:07

## 2021-12-23 RX ADMIN — SODIUM BICARBONATE 1300 MG: 650 TABLET ORAL at 08:08

## 2021-12-23 RX ADMIN — HEPARIN SODIUM 5000 UNITS: 5000 INJECTION INTRAVENOUS; SUBCUTANEOUS at 13:15

## 2021-12-23 RX ADMIN — INSULIN LISPRO 5 UNITS: 100 INJECTION, SOLUTION INTRAVENOUS; SUBCUTANEOUS at 08:11

## 2021-12-23 RX ADMIN — INSULIN LISPRO 4 UNITS: 100 INJECTION, SOLUTION INTRAVENOUS; SUBCUTANEOUS at 08:11

## 2021-12-23 RX ADMIN — METOLAZONE 5 MG: 5 TABLET ORAL at 08:08

## 2021-12-23 RX ADMIN — INSULIN GLARGINE 20 UNITS: 100 INJECTION, SOLUTION SUBCUTANEOUS at 08:12

## 2021-12-23 RX ADMIN — INSULIN LISPRO 5 UNITS: 100 INJECTION, SOLUTION INTRAVENOUS; SUBCUTANEOUS at 12:37

## 2021-12-23 RX ADMIN — METOLAZONE 5 MG: 5 TABLET ORAL at 15:16

## 2021-12-23 RX ADMIN — FLUOXETINE 20 MG: 20 CAPSULE ORAL at 08:08

## 2021-12-23 RX ADMIN — FUROSEMIDE 80 MG: 10 INJECTION, SOLUTION INTRAMUSCULAR; INTRAVENOUS at 08:08

## 2021-12-23 RX ADMIN — TORSEMIDE 100 MG: 100 TABLET ORAL at 15:16

## 2021-12-23 RX ADMIN — SODIUM CHLORIDE, PRESERVATIVE FREE 10 ML: 5 INJECTION INTRAVENOUS at 08:08

## 2021-12-23 RX ADMIN — PREGABALIN 200 MG: 100 CAPSULE ORAL at 08:08

## 2021-12-23 RX ADMIN — SODIUM BICARBONATE 1300 MG: 650 TABLET ORAL at 13:15

## 2021-12-23 ASSESSMENT — PAIN SCALES - GENERAL
PAINLEVEL_OUTOF10: 0
PAINLEVEL_OUTOF10: 0

## 2021-12-23 NOTE — PROGRESS NOTES
I received a call from the nurse. I was informed that patient is an active IV drug user. The patient has endocarditis and will need nursing home placement for IV antibiotic therapy completion until January 10, 2021    If patient absolutely does not want to go to nursing home, oral Keflex 500 mg every 6 hours until January 10 could be considered as a last resort, however, oral antibiotic is not an ideal therapy for endocarditis and will have my chances of treatment failure with life-threatening consequences. Hence, my strong recommendation will be nursing home placement until completion of IV antibiotics and then removal of tunneled central line before discharge from nursing home    Discussed in detail with RN today.       Jacquie Horner MD, MPH, FACP, [facility]  12/23/2021, 11:58 AM  [facility]   [address] [place] [address], [address] [place] [address]  Office: [phone]  Fax: [phone]  Clinic days:  Tuesday & Thursday

## 2021-12-23 NOTE — PROGRESS NOTES
Spoke to patient with , patient states he has been clean from drugs for 2 years. Patient states he wants to go home- refusing facility      Discussed with patient purpose of tunneled line for antibiotic use and only antibiotic use. Patient verbalized understanding. Discussed with patient his diagnosis, plan of care and importance of following directions and follow up     Spoke to Jessika Clarke the risk management, discussed patient, situation of patients line placement and history of drug use. At this time, no evidence or documentation of current use. Patient denies any current use. Patient states he has not used in 2 years. Plan to continue on with discharge to home.  Primary nurse Charles De Paz to continue care and patient education

## 2021-12-23 NOTE — PROGRESS NOTES
Call placed to Dr Ashlie Ravi per Hospitalist request to discuss patient status and plan of care- will update

## 2021-12-23 NOTE — PROGRESS NOTES
Discharge paperwork reviewed with patient. Patient educated on new medications, doses and times to take. Medications were delivered to patient by retail pharmacy. Patient also provided with 2 written prescriptions for sodium bicarbonate and metolazone. Patient verbalized understanding. Patient educated on tunneled catheter and was told that the tunneled catheter is used for IV antibiotics only. Patient educated that if anything else were to go in through the tunneled catheter, it may result in death. Patient informed that tunneled cath should only be used by infusion center when receiving antibiotics. Patient provided with written education regarding the tunneled catheter, IV antibiotics, and IV drug use. Peripheral IV removed without complication. Heart monitor removed and returned to [address]

## 2021-12-23 NOTE — PROGRESS NOTES
Isabel is here today for   Chief Complaint   Patient presents with   • Office Visit   • Pre-Op Exam   .        Medication Refills needed today?  NO,   if you receive a prescription today would you like it to be sent to [facility]? YES      Patient would like communication of their results via:      Cell Phone:   Telephone Information:   Mobile [phone]     Okay to leave a message containing results? Yes          Health Maintenance Summary     Influenza Vaccine (1)  Postponed until 6/30/2021    Breast Cancer Screening (Yearly)  Next due on 12/31/2021    Depression Screening (Yearly)  Next due on 1/21/2022    Cervical Cancer Screening HPV CO-Testing (Every 5 Years)  Next due on 1/8/2024    DTaP/Tdap/Td Vaccine (2 - Td)  Next due on 1/21/2031    Hepatitis B Vaccine   Completed    Meningococcal Vaccine   Aged Out    HPV Vaccine   Aged Out    Pneumococcal Vaccine 0-64   Aged Out          Patient is up to date, no discussion needed. .           Patient's blood sugar was 65 when checked for lunch time. Patient given apple sauce and crackers. Patient's blood sugar rechecked 15 minutes later and blood sugar is now 106.

## 2021-12-23 NOTE — CARE COORDINATION
Call placed back to [employer] to inquire on the ride arranged for tonight. I am told  is scheduled for 5:30 . [phone] is their phone number. They will be calling bedside RN's number to inform of  ready. Informed bedside RN. He is ready for release when  is here.   Jo Ann Vargas [place]     Case Management   [phone]    12/23/2021  5:02 PM

## 2021-12-23 NOTE — PLAN OF CARE
Problem: Pain:  Goal: Pain level will decrease  Description: Pain level will decrease  12/23/2021 0917 by Henri Duncan RN  Outcome: Ongoing     Problem: Pain:  Goal: Control of acute pain  Description: Control of acute pain  12/23/2021 0917 by Henri Duncan RN  Outcome: Ongoing     Problem: Pain:  Goal: Control of chronic pain  Description: Control of chronic pain  Outcome: Ongoing     Problem: Pain:  Goal: Patient's pain/discomfort is manageable  Description: Patient's pain/discomfort is manageable  12/23/2021 0917 by Henri Duncan RN  Outcome: Ongoing     Problem: Skin Integrity:  Goal: Will show no infection signs and symptoms  Description: Will show no infection signs and symptoms  12/23/2021 0917 by Henri Duncan RN  Outcome: Ongoing     Problem: Skin Integrity:  Goal: Absence of new skin breakdown  Description: Absence of new skin breakdown  12/23/2021 0917 by Henri Duncan RN  Outcome: Ongoing     Problem: Falls - Risk of:  Goal: Will remain free from falls  Description: Will remain free from falls  12/23/2021 0917 by Henri Duncan RN  Outcome: Ongoing     Problem: Falls - Risk of:  Goal: Absence of physical injury  Description: Absence of physical injury  12/23/2021 0917 by Henri Duncan RN  Outcome: Ongoing     Problem: Infection:  Goal: Will remain free from infection  Description: Will remain free from infection  12/23/2021 0917 by Henri Duncan RN  Outcome: Ongoing     Problem: Safety:  Goal: Free from accidental physical injury  Description: Free from accidental physical injury  12/23/2021 0917 by Henri Duncan RN  Outcome: Ongoing     Problem: Daily Care:  Goal: Daily care needs are met  Description: Daily care needs are met  12/23/2021 0917 by Henri Duncan RN  Outcome: Ongoing     Problem: Skin Integrity:  Goal: Skin integrity will stabilize  Description: Skin integrity will stabilize  12/23/2021 0917 by Henri Duncan RN  Outcome: Ongoing     Problem: Discharge Planning:  Goal: Patients continuum of care needs are met  Description: Patients continuum of care needs are met  12/23/2021 0917 by Felicita Scheuermann, RN  Outcome: Ongoing     Problem:  Activity:  Goal: Risk for activity intolerance will decrease  Description: Risk for activity intolerance will decrease  12/23/2021 0917 by Felicita Scheuermann, RN  Outcome: Ongoing     Problem: Coping:  Goal: Ability to identify and develop effective coping behavior will improve  Description: Ability to identify and develop effective coping behavior will improve  Outcome: Ongoing     Problem: Coping:  Goal: Expressions of feelings of enhanced comfort will increase  Description: Expressions of feelings of enhanced comfort will increase  Outcome: Ongoing     Problem: Fluid Volume:  Goal: Will show no signs or symptoms of fluid imbalance  Description: Will show no signs or symptoms of fluid imbalance  12/23/2021 0917 by Felicita Scheuermann, RN  Outcome: Ongoing     Problem: Fluid Volume:  Goal: Ability to achieve a balanced intake and output will improve  Description: Ability to achieve a balanced intake and output will improve  12/23/2021 0917 by Felicita Scheuermann, RN  Outcome: Ongoing     Problem: Health Behavior:  Goal: Ability to manage health-related needs will improve  Description: Ability to manage health-related needs will improve  12/23/2021 0917 by Felicita Scheuermann, RN  Outcome: Ongoing     Problem: Nutritional:  Goal: Maintenance of adequate nutrition will be supported  Description: Maintenance of adequate nutrition will be supported  12/23/2021 0917 by Felicita Scheuermann, RN  Outcome: Ongoing     Problem: Physical Regulation:  Goal: Complications related to the disease process, condition or treatment will be avoided or minimized  Description: Complications related to the disease process, condition or treatment will be avoided or minimized  Outcome: Ongoing     Problem: Physical Regulation:  Goal: Ability to maintain clinical measurements within normal limits will improve  Description: Ability to maintain clinical measurements within normal limits will improve  Outcome: Ongoing     Problem: Physical Regulation:  Goal: Will show no signs and symptoms of electrolyte imbalance  Description: Will show no signs and symptoms of electrolyte imbalance  Outcome: Ongoing     Problem: Urinary Elimination:  Goal: Will remain free from infection  Description: Will remain free from infection  12/23/2021 0917 by Danii Brumfield RN  Outcome: Ongoing     Problem: Urinary Elimination:  Goal: Ability to achieve a balanced intake and output will improve  Description: Ability to achieve a balanced intake and output will improve  12/23/2021 0917 by Danii Brumfield RN  Outcome: Ongoing     Problem: Urinary Elimination:  Goal: Ability to achieve and maintain adequate urine output will be supported  Description: Ability to achieve and maintain adequate urine output will be supported  Outcome: Ongoing     Problem: Urinary Elimination:  Goal: Ability to recognize the need to void and respond appropriately will improve  Description: Ability to recognize the need to void and respond appropriately will improve  Outcome: Ongoing     Problem: Serum Glucose Level - Abnormal:  Goal: Ability to maintain appropriate glucose levels has stabilized  Description: Ability to maintain appropriate glucose levels has stabilized  12/23/2021 0917 by Danii Brumfield RN  Outcome: Ongoing

## 2021-12-23 NOTE — CARE COORDINATION
SOCIAL WORK DISCHARGE SUMMARY:      DISCHARGE DATE:                 Thursday, 12-        DISCHARGE PLACE:                  Home      OUT PATIENT INFUSION CENTER:           [address]             PHONE NUMBER                            [phone] EXT [phone]             FAX NUMBER                                               [phone]                               TRANSPORTATION:                           [facility]             TIME:                                                  5:30 PM              PHONE NUMBER:                           [phone]            cONFIRMATION nUMBER                 [id number]      PREFERRED PHARMACY:                      [facility]IL [facility]             NUMBER:                            Benji Mix [facility]     Case Management   [phone]    12/23/2021  5:05 PM

## 2021-12-23 NOTE — DISCHARGE SUMMARY
Hospital Medicine Discharge Summary    Nik Lawler  :  1995  MRN:  [id number]    Admit date:  2021  Discharge date:  2021    Admitting Physician:  Nate Loyola MD  Primary Care Physician:  No primary care provider on file. Discharge Diagnoses: Active Problems:    Acute infective endocarditis    Acute kidney injury superimposed on chronic kidney disease (HCC)    Metabolic acidosis    Peripheral edema    CKD (chronic kidney disease)    DKA (diabetic ketoacidosis) ([facility]    Type 1 diabetes mellitus ([facility]    MSSA bacteremia    Poorly controlled type 2 diabetes mellitus ([facility]    History of intravenous drug use in remission    Weight loss counseling, encounter for    Receiving intravenous antibiotic treatment as outpatient  Resolved Problems:    * No resolved hospital problems.  *      Hospital Course:   Nik Lawler is a 27 y.o. male that was admitted and treated at [facility] for the following medical issues:                The patient is a 27 y.o. male with past [place] of type 1 diabetes and stage IV chronic kidney disease but has not been seeing a nephrologist recently for further evaluation who presents to [facility] as a transfer from outside facility per transcript as below which is brought from previous progress note by Dr. Winston Simpson of internal medicine:     \"In summary, this is a 28-year-old male patient with a past medical history of type 1 diabetes and chronic kidney disease. [facility] was homeless and living in [place] and moved to [place] 3 weeks ago to live with a cousin.  Shortly after arriving here he became ill and presented to [address] [place] [address] there he was intubated for airway protection as he was agitated and encephalopathic. [facility] was found to be hyperglycemic and blood cultures grew MSSA bacteremia.  He underwent a surface echocardiogram which demonstrated possible vegetation on his aortic valve.  He was placed on vancomycin and was discharged. Wojciech Manzanares presented for repeat lab work and was found to be hyperkalemic with progression to MONICA on CKD. Wojciech Manzanares was brought to [facility] for potential need of dialysis.  On arrival to the emergency room he was found to have blood sugars in the 800s with pseudohyponatremia and evidence of metabolic acidosis with bicarb of 14.  His examination was also consistent with anasarca and significant scrotal edema.  He was placed on insulin drip and bicarb was given to his fluids as his bicarb losses were likely also due to renal failure.  Blood sugars significantly improved, however, he is developing hyperglycemia again likely due to not having his basal insulin when the drip was stopped.     While working to manage his acid-base status and his hyperglycemia is a primary goal.  Patient appears to be severely edematous with etiologies likely being either renal failure or congestive heart failure from valve injury from endocarditis.  On the thoracic echocardiogram obtained a few days ago there was no mention of any significant aortic regurgitation.  Patient skin is warm.  A Farris catheter was placed due to the significant scrotal edema and he is having significant urine output.  Patient needs a DIANA to evaluate the integrity of his aortic valve.  Otherwise his vitals are currently stable and he is afebrile.  Repeat blood cultures are pending.  I discussed with cardiology and infectious disease.  Nephrology is also on his care.  We will switch antibiotics from vancomycin to nafcillin.  Given the extensive burden COVID-19 is placed on her hospital we are discussing with other hospitals to assist with load balancing given that many of our procedures have been delayed due to immense staffing shortages. Maddie Vincent is currently being considered for transfer to [facility] in [place] [id number] he stay we will continue the work-up as mentioned above. \"     Patient confirmed the above history, he notes that last Thursday he was confirmed to have endocarditis and was started on vancomycin and sent home but was receiving vancomycin transfusions at the transfusion center where he was living. Unfortunate was noted that his creatinine was worsening and he was also only after started on the vancomycin starting to develop significant peripheral edema going all the way from his legs up to his scrotum which is why he had difficulty with urinating although prior to this he was not having issues with urinary retention for a long time. Apart from the worsening full body edema and above considerations for worsening hyperglycemia as well as concern for worsening infection from the endocarditis, patient denies any recent new fevers, chills from baseline, dizziness, syncope, chest pain, dysuria, blood in urine/stool/sputum, nausea/vomiting/diarrhea/abdominal pain. Patient was seen in consultation by cardiology and underwent DIANA endocarditis involving the aortic valve patient seen in follow-up by infectious disease received treatment also has acute kidney injury and followed by nephrology patient is now stable to be discharged from nephrology and cardiology standpoint to follow-up with PCP and probably cardiology and nephrology as an outpatient, patient has remained stable and being discharged in good clinical condition      Active Problems:    Acute infective endocarditis    Acute kidney injury superimposed on chronic kidney disease ([address]    Metabolic acidosis    Peripheral edema    CKD (chronic kidney disease)    DKA (diabetic ketoacidosis) ([facility]    Type 1 diabetes mellitus ([facility]    MSSA bacteremia    Poorly controlled type 2 diabetes mellitus ([facility]    History of intravenous drug use in remission    Weight loss counseling, encounter for    Receiving intravenous antibiotic treatment as outpatient  Resolved Problems:    * No resolved hospital problems.  *      Patient was seen by the following consultants while admitted to [address] [place] [address] : Consults:  IP CONSULT TO NEPHROLOGY  IP CONSULT TO INFECTIOUS DISEASES  IP CONSULT TO CARDIOLOGY  IP CONSULT TO UROLOGY  IP CONSULT TO DIABETES EDUCATOR    Significant Diagnostic Studies:    VL Extremity Venous Left    Result Date: 12/19/2021  Upper Extremities Veins  Demographics   Patient Name       Alisha Arizmendi   Date of Study      12/17/2021         Gender              Male   Patient Number     [id number]         Date of Birth       1995   Visit Number       [id number]          Age                 27 year(s)   Accession Number   [id number]         Room Number         6784   Corporate ID       [id number]           Candy Gutierrez [facility]                                                            CCT   Ordering Physician Shashank Bryant,   Interpreting        Claud Harada, MD                 Physician           MD  Procedure Type of Study:   Veins:Upper Extremities Veins, VL EXTREMITY VENOUS DUPLEX LEFT. Vascular Sonographer Report  Indications for Study:Edema. Additional Indications:CKD stage IV Impressions Left Impression No evidence of deep vein thrombosis or superficial vein thrombosis of the left upper extremity or right internal jugular vein and subclavian vein. Left edema noted. Conclusions   Summary   No evidence of deep vein thrombosis or superficial vein thrombosis of the  left upper extremity   Signature   ------------------------------------------------------------------  Electronically signed by Claud Harada, MD (Interpreting  physician) on 12/19/2021 at 04:18 PM  ------------------------------------------------------------------  Patient Status:Routine. Study [place] [address] - Vascular Lab. Technical Quality:Adequate visualization. Velocities are measured in cm/s ; Diameters are measured in mm Right UE Vein Measurements 2D Measurements +--------+----------+---------------+----------+ ! Location! Visualized! Compressibility! Thrombosis! +--------+----------+---------------+----------+ ! IJV     ! Yes       ! Yes            ! None      ! +--------+----------+---------------+----------+ ! SCV     ! Yes       ! Yes            ! None      ! +--------+----------+---------------+----------+ Doppler Measurements +--------+------+------+ ! Location! Signal!Reflux! +--------+------+------+ ! IJV     ! Phasic!      ! +--------+------+------+ ! SCV     ! Phasic!      ! +--------+------+------+ Left UE Vein Measurements 2D Measurements +--------+----------+---------------+----------+ ! Location! Visualized! Compressibility! Thrombosis! +--------+----------+---------------+----------+ ! IJV     ! Yes       ! Yes            ! None      ! +--------+----------+---------------+----------+ ! SCV     ! Yes       ! Yes            ! None      ! +--------+----------+---------------+----------+ ! Axillary! Yes       ! Yes            ! None      ! +--------+----------+---------------+----------+ ! Brachial!Yes       ! Yes            ! None      ! +--------+----------+---------------+----------+ ! Radial  !Yes       ! Yes            ! None      ! +--------+----------+---------------+----------+ ! Ulnar   ! Yes       ! Yes            ! None      ! +--------+----------+---------------+----------+ ! Basilic ! Yes       ! Yes            ! None      ! +--------+----------+---------------+----------+ ! Cephalic! Yes       ! Yes            ! None      ! +--------+----------+---------------+----------+ Doppler Measurements +--------+------+------+ ! Location! Signal!Reflux! +--------+------+------+ ! IJV     ! Phasic!      ! +--------+------+------+ ! SCV     ! Phasic!      ! +--------+------+------+ ! Axillary! Phasic! No    ! +--------+------+------+    XR CHEST PORTABLE    Result Date: 12/16/2021  EXAMINATION: ONE XRAY VIEW OF THE CHEST 12/16/2021 5:11 am COMPARISON: None.  HISTORY: ORDERING SYSTEM PROVIDED HISTORY: endocarditis TECHNOLOGIST PROVIDED HISTORY: Reason for exam:->endocarditis Reason for Exam: endocarditis FINDINGS: Heart size is normal.  Mediastinal contours are normal.  There is small right-sided pleural effusion. There is right basilar consolidation No focal consolidation on the left. Right-sided pleural effusion with adjacent right basilar consolidation, either atelectasis or pneumonia. Discharge Medications:         Medication List      START taking these medications    amLODIPine 10 MG tablet  Commonly known as: NORVASC  Take 1 tablet by mouth daily  Start taking on: December 24, 2021     Lancets Misc  1 each by Does not apply route 4 times daily     metOLazone 5 MG tablet  Commonly known as: ZAROXOLYN  Take 1 tablet by mouth 2 times daily     sodium bicarbonate 650 MG tablet  Take 2 tablets by mouth 3 times daily     spironolactone 25 MG tablet  Commonly known as: ALDACTONE  Take 1 tablet by mouth daily  Start taking on: December 24, 2021     True Metrix Blood Glucose Test strip  Generic drug: blood glucose test strips  1 each by In Vitro route 4 times daily (with meals and nightly) As needed.      True Metrix Meter w/Device Kit  1 kit by Does not apply route 4 times daily (before meals and nightly)        CONTINUE taking these medications    FERROUS SULFATE PO     FLUoxetine 20 MG capsule  Commonly known as: PROZAC     insulin glargine 100 UNIT/ML injection vial  Commonly known as: LANTUS     pregabalin 100 MG capsule  Commonly known as: LYRICA     SEROquel 25 MG tablet  Generic drug: QUEtiapine           Where to Get Your Medications      These medications were sent to [address] [place] [address] Rd, [address] - F [phone]  [facility]    Phone: [phone]   · amLODIPine 10 MG tablet  · Lancets Misc  · spironolactone 25 MG tablet  · True Metrix Blood Glucose Test strip  · True Metrix Meter w/Device Kit     You can get these medications from any pharmacy    Bring a paper prescription for each of these medications  · metOLazone 5 MG tablet  · sodium bicarbonate 650 MG tablet         Disposition:   Discharged to Home. Any [facility] needs that were indicated and/or required as been addressed and set up by Social Work. Condition at discharge: Pt was medically stable at the time of discharge. Activity: activity as tolerated    Total time taken for discharging this patient: 40 minutes. Greater than 70% of time was spent focused exclusively on this patient. Time was taken to review chart, discuss plans with consultants, reconciling medications, discussing plan answering questions with patient. Signed:  Moira Hoang MD  12/23/2021, 10:08 AM  ----------------------------------------------------------------------------------------------------------------------    Briseyda Carranza,     Please return to ER or call 911 if you develop any significant signs or symptoms.     I may not have addressed all of your medical illnesses or the abnormal blood work or imaging therefore please ask your PCP, No primary care provider on file. ,  to obtain [facility] record to follow up on all of the abnormal labs, imaging and findings that I have and have not addressed during your hospitalization.      Discharging you from the hospital does not mean that your medical care ends here and now. You may still need additional work up, investigation, monitoring, and treatment to be handled from this point on by outside providers including your PCP, No primary care provider on file. , Specialists and other healthcare providers.      Please review your list of discharge medications prior to resuming medications you might still have at home, as the medications you need to be taking, dosages or how often you must take them may have changed. For medication questions, contact your retail pharmacy and your PCP, No primary care provider on file. .     ** I STRONGLY RECOMMEND that you follow up with No primary care provider on file.  within 3 to 5 days for a post hospitalization evaluation. This specific office visit is covered by your insurance, and is not the same as your annual doctor visit/ check up. This office visit is important, as it may prevent need for repeat and/or future hospitalizations. **    Your medical team at [facility] ([facility]) appreciates the opportunity to work with you to get well!     Sincerely,  Zachariah Flaherty MD

## 2021-12-23 NOTE — PROGRESS NOTES
[facility]                                               Progress note    Summary:   Aubree De Jesus is being seen by nephrology for MONICA on CKD stage 4. Admitted with endocarditis and DKA. Has known type 1 DM, complicated by CKD stage 4. Only recently moved here [place] [facility] [place] Lives [place] to [place] with his cousin now. He was transferred from OSH to here. Interval History  Seen at bedside. SOB resolved. Edema improving on lasix IV 80 mg BID, metolazone 5 mg BID, spironolactone 25 mg daily. Made 4200 mL urine yesterday. Weight down to 63 kg (140 lbs)  Cr stable at 5.6  BP still slightly elevated but better    Plan:   - ok for discharge on torsemide 100 mg daily, metolazone 5 mg BID, spironolactone 25 mg daily. - 1500 mL fluid restriction. 2g sodium restriction  - 24 hour urine protein, albumin, and creatinine complete. - continue sodium bicarb 1300 mg TID for acidosis  - daily standing weights. - will need a local nephrology referral at discharge. Until that is established will plan to f/u by tele visits to ensure follow up      Marsha Gonzalez MD  [facility]  Office: [phone]      Assessment:  Acute kidney injury  Multifactorial MONICA in the setting of endocarditis, antibiotics including vancomycin  Creatinine peaked at 4.8  Baseline creatinine 3.2  Has Farris's not obstructed. random Vanco level of 24.6  Is significant CKD so low reserve for renal injury  He was hyperglycemic so may become hypovolemic  Clinically he has a lot of edema and profound hypoalbuminemia  Nephrotic range proteinuria in the setting of known type 1 diabetes  Baseline creatinine reported to be around 3.2, he was stage IV CKD already  Protein creatinine ratio 7.8 g urinalysis showed no blood 5-9 WBCs no casts, positive for glucose urea  Does have anemia but no thrombocytopenia to suggest TMA process.   c3 and c4 normal.   DUTCH  ANCA  AntiGBM  24 hour urine collection with 7.2 g/day    Chronic kidney disease stage IV  Has diabetic nephropathy most likely in setting of longstanding proteinuria and uncontrolled diabetes  Was seeing a nephrologist in [place] and was told that he was stage IV  He has not been referred for transplant work-up or dialysis access as of now. Was having issues with drug use    Metabolic acidosis  Likely secondary to renal failure  Was on a bicarb drip  Had mild anion gap with hyperglycemia which improved with an insulin drip. Lactate negative at 0.9  Urine tox was negative    Generalized edema  EF 65% mild concentric hypertrophy  Has nephrotic range proteinuria and hypoalbuminemia  He has significant pitting lower extremity edema. Chest x-ray shows right-sided pleural effusion with adjacent right basilar consolidation    MSSA bacterial endocarditis  There is an aortic valve vegetation suspected on echo from [facility]  He is history receiving vancomycin infusions  He is now on cefazolin  Cardiology has seen and there is no plan for DIANA at this time    Type 1 diabetes  Complicated by renal failure  Blood glucose 862 on admission with anion gap 17  pH 7.3 bicarb 13 on blood gas  Treated with an insulin drip and has improved. HPI:  Melissa Wall is being seen by nephrology for MONICA on CKD stage 4. This is a 26 yo man with past medical history significant for type 1 diabetes, CKD stage IV for which she was following with a nephrologist in [place]. He moved here about 3 weeks ago to live with his cousin because he became homeless when he was in [place]. He does not have doctors in town yet. He was initially taken to [facility] emergency room for altered mental status and agitation. He was found to be hyperglycemic and there was concern for bacterial endocarditis. Patient tells me that a couple years ago he had an issue with methamphetamine but no longer does this. He does smoke cigarettes, tobacco use currently. Denies IV drug use.   He says that he has been having generalized edema for the past 2 months at least.  He was diagnosed with diabetes around 6years old has had diabetes for about 20 years. He denies hematuria but has seen very foamy frothy urine in the toilet. He does not use any NSAIDs. He was diagnosed with aortic valve endocarditis MSSA bacteria. He was post beginning IV vancomycin infusions. His baseline creatinine is around 3.2. Currently he is awake and alert in no acute distress. He has significant lower extremity edema 2+ pitting. No rashes or lesions. No petechia. ROS:   Positives Listed Bold. All other remaining systems are negative. Constitutional:  fever, chills, weakness, weight change, fatigue,      Skin:  rash, pruritus, hair loss, bruising, dry skin, petechiae. Head, Face, Neck   headaches, swelling,  cervical adenopathy. Respiratory: shortness of breath, cough, or wheezing  Cardiovascular: chest pain, palpitations, dizzy, edema  Gastrointestinal: nausea, vomiting, diarrhea, constipation,belly pain    Yellow skin, blood in stool  Musculoskeletal:  back pain, muscle weakness, gait problems,       joint pain or swelling. Genitourinary:  dysuria, poor urine flow, flank pain, blood in urine  Neurologic:  vertigo, TIA'S, syncope, seizures, focal weakness  Psychosocial:  insomnia, anxiety, or depression. Additional positive findings: -     PMH:   Past medical history, surgical history, social history, family history are reviewed and updated as appropriate. Reviewed current medication list.   Allergies reviewed and updated as needed. PE:   Vitals:    12/23/21 1110   BP: (!) 147/86   Pulse: 90   Resp: 16   Temp: 97.6 °F (36.4 °C)   SpO2: 96%       General appearance: white male in NAD, fully alert and oriented. Comfortable. HEENT: EOM intact, no icterus. Trachea is midline.    Neck : No masses, appears symmetrical, no JVD  Respiratory: Respiratory effort appears normal, bilateral equal chest rise, no wheeze, no crackles

## 2021-12-23 NOTE — PROGRESS NOTES
Social work reaching out to [place] because infusion center has stated that patient has come to infusion center with track marks before. This RN has spoken face to face with [facility] MD about patient having an IV drug use history.  MD states to defer to Infectious disease MD. Reached out to infectious disease MD.

## 2021-12-23 NOTE — PROGRESS NOTES
CLINICAL PHARMACY NOTE: MEDS TO BEDS    Total # of Prescriptions Filled: 6   The following medications were delivered to the patient:  Current Discharge Medication List      START taking these medications    Details   Blood Glucose Monitoring Suppl (TRUE METRIX METER) w/Device KIT 1 kit by Does not apply route 4 times daily (before meals and nightly)  Qty: 1 kit, Refills: 0    Associated Diagnoses: Type 1 diabetes mellitus with nephropathy ([facility] DKA, type 1, not at goal ([facility]      blood glucose test strips (TRUE METRIX BLOOD GLUCOSE TEST) strip 1 each by In Vitro route 4 times daily (with meals and nightly) As needed.   Qty: 200 each, Refills: 3    Associated Diagnoses: Type 1 diabetes mellitus with nephropathy ([facility] DKA, type 1, not at goal [facility])      Lancets MISC 1 each by Does not apply route 4 times daily  Qty: 200 each, Refills: 0    Associated Diagnoses: Type 1 diabetes mellitus with nephropathy ([facility] DKA, type 1, not at goal ([facility]      sodium bicarbonate 650 MG tablet Take 2 tablets by mouth 3 times daily  Qty: 90 tablet, Refills: 1      amLODIPine (NORVASC) 10 MG tablet Take 1 tablet by mouth daily  Qty: 30 tablet, Refills: 3      metOLazone (ZAROXOLYN) 5 MG tablet Take 1 tablet by mouth 2 times daily  Qty: 60 tablet, Refills: 1      spironolactone (ALDACTONE) 25 MG tablet Take 1 tablet by mouth daily  Qty: 30 tablet, Refills: 3      torsemide (DEMADEX) 100 MG tablet Take 1 tablet by mouth daily  Qty: 30 tablet, Refills: 3         · Did not dispense metolazone or Sodium bicarb  ·     Additional Documentation:

## 2021-12-23 NOTE — PLAN OF CARE
Problem: Pain:  Goal: Pain level will decrease  Description: Pain level will decrease  Outcome: Ongoing     Problem: Pain:  Goal: Control of acute pain  Description: Control of acute pain  Outcome: Ongoing     Problem: Pain:  Goal: Patient's pain/discomfort is manageable  Description: Patient's pain/discomfort is manageable  Outcome: Ongoing     Problem: Skin Integrity:  Goal: Will show no infection signs and symptoms  Description: Will show no infection signs and symptoms  Outcome: Ongoing     Problem: Skin Integrity:  Goal: Absence of new skin breakdown  Description: Absence of new skin breakdown  Outcome: Ongoing     Problem: Falls - Risk of:  Goal: Will remain free from falls  Description: Will remain free from falls  Outcome: Ongoing     Problem: Falls - Risk of:  Goal: Absence of physical injury  Description: Absence of physical injury  Outcome: Ongoing     Problem: Infection:  Goal: Will remain free from infection  Description: Will remain free from infection  Outcome: Ongoing     Problem: Safety:  Goal: Free from accidental physical injury  Description: Free from accidental physical injury  Outcome: Ongoing     Problem: Daily Care:  Goal: Daily care needs are met  Description: Daily care needs are met  Outcome: Ongoing     Problem: Skin Integrity:  Goal: Skin integrity will stabilize  Description: Skin integrity will stabilize  Outcome: Ongoing     Problem: Discharge Planning:  Goal: Patients continuum of care needs are met  Description: Patients continuum of care needs are met  Outcome: Ongoing     Problem:  Activity:  Goal: Risk for activity intolerance will decrease  Description: Risk for activity intolerance will decrease  Outcome: Ongoing     Problem: Fluid Volume:  Goal: Will show no signs or symptoms of fluid imbalance  Description: Will show no signs or symptoms of fluid imbalance  Outcome: Ongoing     Problem: Fluid Volume:  Goal: Ability to achieve a balanced intake and output will improve  Description: Ability to achieve a balanced intake and output will improve  Outcome: Ongoing     Problem: Health Behavior:  Goal: Ability to manage health-related needs will improve  Description: Ability to manage health-related needs will improve  Outcome: Ongoing     Problem: Nutritional:  Goal: Maintenance of adequate nutrition will be supported  Description: Maintenance of adequate nutrition will be supported  Outcome: Ongoing     Problem: Urinary Elimination:  Goal: Will remain free from infection  Description: Will remain free from infection  Outcome: Ongoing     Problem: Urinary Elimination:  Goal: Ability to achieve a balanced intake and output will improve  Description: Ability to achieve a balanced intake and output will improve  Outcome: Ongoing     Problem: Serum Glucose Level - Abnormal:  Goal: Ability to maintain appropriate glucose levels has stabilized  Description: Ability to maintain appropriate glucose levels has stabilized  Outcome: Ongoing

## 2021-12-23 NOTE — CARE COORDINATION
Call rec'd from bedside RN that pt 's cousin cannot pick him up and he will need a ride home. Spoke with pt to verify address. He reports it is 2.5 hours away. Call placed to [employer]:  [phone]    #2, #1  Spoke with Tonya Boykin;  Trip # [id number]  She reports this could take anywhere from 5 minutes to 3 hour wait for them to find transportation. They will call to the bedside RN at [phone]. Updated RN.   Colt Almonte [place]     Case Management   [phone]    12/23/2021  2:15 PM

## 2021-12-23 NOTE — CARE COORDINATION
Met with pateint along with  Chris Núñez., Nursing manager to discuss DC planning. Concerns raised for DC to home with open access in which he is to continue IV therapy at [facility] in [facility]. He reports he has not used in two years. Offered a SNF to finish off his ABX. His desire is to return home. His cousin is to transport him home.   Deon Gonzales [facility]     Case Management   [phone]    12/23/2021  1:22 PM

## 2021-12-23 NOTE — CARE COORDINATION
DC order rec'd. Chart Reviewed. Call placed to bedside RN who reports he is getting his picc line today. Call placed to Richar Baron with [facility], [address] [facility] ext [phone] who can accept him back and needs orders faxed to her at [phone]. She reports pt will need to go to the [facility] ED Room #2 starting on Friday. She has a 12 noon or 10:30 am appts open. Spoke with patient to review. He prefers the 12 noon appt. He verbalized understanding of going to the ED Room. His cousin is picking him up today. He denies any concerns. Called back to [place] at [facility] to inform of 12 noon preference. She states she is concerned about pt getting a  picc line with pt's drug use history that she reports is current. Call placed back to bedside RN to inform of concern. Perfect served to Attending to discuss. Dr Nasreen Greer did come to the floor to discuss with bedside RN while I was on the line. Call placed to Dr Tani Santiago office to request return call about [address] [place] stating it is a PICC line vs a tunnel cath.   [place], [place]     Case Management   [phone]    12/23/2021  11:46 AM

## 2021-12-24 NOTE — CARE COORDINATION
12-  Patient called this worker to state he did not get his scripts from [facility]. Spoke with Servando Wolff who spoke with bedside RN that discharged him yesterday. He was given hard scripts and placed in his bag. He was given several medications that were filled at [facility]  Call placed back to him at [phone]   Informed him to look in his bag. He did find the two scripts he was looking for. He confirmed he did go to his infusion Center today.   [place], [place]     Case Management   [phone]    12/24/2021  1:16 PM

## 2021-12-27 ENCOUNTER — TELEPHONE (OUTPATIENT)
Dept: INFECTIOUS DISEASES | Age: 26
End: 2021-12-27

## 2022-01-03 LAB
BASOPHILS ABSOLUTE: ABNORMAL
BASOPHILS RELATIVE PERCENT: 3 %
BUN BLDV-MCNC: 116 MG/DL
CALCIUM SERPL-MCNC: 8.2 MG/DL
CHLORIDE BLD-SCNC: 94 MMOL/L
CO2: 25 MMOL/L
CREAT SERPL-MCNC: 5.8 MG/DL
EOSINOPHILS ABSOLUTE: ABNORMAL
EOSINOPHILS RELATIVE PERCENT: 7.9 %
GFR CALCULATED: NORMAL
GLUCOSE BLD-MCNC: 76 MG/DL
HCT VFR BLD CALC: 21.4 % (ref 41–53)
HEMOGLOBIN: 7.2 G/DL (ref 13.5–17.5)
LYMPHOCYTES ABSOLUTE: ABNORMAL
LYMPHOCYTES RELATIVE PERCENT: 13.5 %
MCH RBC QN AUTO: 29.6 PG
MCHC RBC AUTO-ENTMCNC: 33.6 G/DL
MCV RBC AUTO: 88 FL
MONOCYTES ABSOLUTE: ABNORMAL
MONOCYTES RELATIVE PERCENT: 14.7 %
NEUTROPHILS ABSOLUTE: ABNORMAL
NEUTROPHILS RELATIVE PERCENT: 60.9 %
PDW BLD-RTO: 15.8 %
PLATELET # BLD: 332 K/ΜL
PMV BLD AUTO: ABNORMAL FL
POTASSIUM SERPL-SCNC: 4.4 MMOL/L
RBC # BLD: 2.43 10^6/ΜL
SODIUM BLD-SCNC: 139 MMOL/L
WBC # BLD: 6.4 10^3/ML

## 2022-01-05 ENCOUNTER — TELEPHONE (OUTPATIENT)
Dept: INFECTIOUS DISEASES | Age: 27
End: 2022-01-05

## 2022-01-05 DIAGNOSIS — I33.0 SUBACUTE BACTERIAL ENDOCARDITIS: Primary | ICD-10-CM

## 2022-01-05 NOTE — TELEPHONE ENCOUNTER
Spoke with Kai Gilman ext [phone] infusion center and she stated that patient has DL PICC and 1 line is clogged. ..cant flush and no blood return. Stated that other line is working well. Patient is due to end IV abx on 1/10 and she stated will get lab work next week and wondering if patient will end as scheduled.   Thank you

## 2022-01-05 NOTE — TELEPHONE ENCOUNTER
If the patient has remained compliant with the entire antibiotic course, we will plan to stop the antibiotic as originally planned. Thanks.

## 2022-01-05 NOTE — TELEPHONE ENCOUNTER
The patient has chronic kidney disease and he is on IV cefazolin 1 g every 24 hours which is adjusted according to his renal function. Serum creatinine values reviewed for the past 2 months and the recent numbers do not appear to be much change from the baseline. Please have them consult nephrology for thoroughness, however, I do not think patient needs any changes in the antibiotic or the dose at this time.

## 2022-01-05 NOTE — TELEPHONE ENCOUNTER
Spoke with Flora Giles and advised of MD note. Verbalized understanding. She stated that patient has appointment with Dr Hollie Joiner, Nephrologist, on 1/26.

## 2022-01-10 ENCOUNTER — TELEPHONE (OUTPATIENT)
Dept: NEPHROLOGY | Age: 27
End: 2022-01-10

## 2022-01-10 DIAGNOSIS — D63.1 ANEMIA IN STAGE 4 CHRONIC KIDNEY DISEASE (HCC): ICD-10-CM

## 2022-01-10 DIAGNOSIS — N18.4 ANEMIA IN STAGE 4 CHRONIC KIDNEY DISEASE (HCC): ICD-10-CM

## 2022-01-10 DIAGNOSIS — N18.4 CKD (CHRONIC KIDNEY DISEASE), STAGE IV (HCC): ICD-10-CM

## 2022-01-10 DIAGNOSIS — R79.89 HIGH CREATININE: Primary | ICD-10-CM

## 2022-01-10 NOTE — TELEPHONE ENCOUNTER
If the patient has remained compliant throughout the antibiotic course that he is now afebrile, okay to stop antibiotic and get the PICC line removed. Ordering outpatient 2D echo to establish a new baseline and assess the condition of his valves after the treatment. Please help him get this scheduled.       Gabrielle Ochoa MD, MPH, Sharlyn Sandifer, DAVID  1/10/2022, 2:12 PM  [facility]   [address] [place], [place] [address] [facility], [address] [place] [address]  Office: [phone]  Fax: [phone]  Clinic days:  Tuesday & Thursday

## 2022-01-10 NOTE — TELEPHONE ENCOUNTER
I added some more labs including iron studies and PTH and phos. I've entered all the orders already. Thanks!

## 2022-01-10 NOTE — TELEPHONE ENCOUNTER
Pt has completed all abx therapy as of today. Ok to pull PICC? If so; please fax order to pull to [phone].

## 2022-01-10 NOTE — TELEPHONE ENCOUNTER
Spoke to pt he is feeling pretty good and drinks some water. He understood to repeat labs and stop aldactone.     Lab order pending

## 2022-01-18 ENCOUNTER — TELEPHONE (OUTPATIENT)
Dept: NEPHROLOGY | Age: 27
End: 2022-01-18

## 2022-01-18 NOTE — TELEPHONE ENCOUNTER
Spoke with patient yesterday  Case discussed over the phone with him  He is a new patient who has been referred to me and I noted his BUN and creatinine to be very high  I called the patient yesterday in the evening and discussed with patient. He says he was seeing a nephrologist in [place]. He says his BUN and creat have been high. He denies any nausea, vomiting or shortness of breath but feels tired. He was supposed to see me last week but he canceled his appt. We agreed to see him yesterday but patient did not have transport. I advised him to go to ED if he did not feel well, he refused. He promised that he will see me this week. His appt was scheduled for this Friday by my office staff already.

## 2022-01-19 ENCOUNTER — OFFICE VISIT (OUTPATIENT)
Dept: CARDIOLOGY CLINIC | Age: 27
End: 2022-01-19
Payer: MEDICARE

## 2022-01-19 VITALS
SYSTOLIC BLOOD PRESSURE: 152 MMHG | WEIGHT: 146 LBS | DIASTOLIC BLOOD PRESSURE: 86 MMHG | HEART RATE: 96 BPM | BODY MASS INDEX: 25.87 KG/M2 | HEIGHT: 63 IN

## 2022-01-19 DIAGNOSIS — I35.1 NONRHEUMATIC AORTIC VALVE INSUFFICIENCY: ICD-10-CM

## 2022-01-19 DIAGNOSIS — I35.8 AORTIC VALVE ENDOCARDITIS: Primary | ICD-10-CM

## 2022-01-19 PROCEDURE — G8484 FLU IMMUNIZE NO ADMIN: HCPCS | Performed by: INTERNAL MEDICINE

## 2022-01-19 PROCEDURE — 99214 OFFICE O/P EST MOD 30 MIN: CPT | Performed by: INTERNAL MEDICINE

## 2022-01-19 PROCEDURE — 4004F PT TOBACCO SCREEN RCVD TLK: CPT | Performed by: INTERNAL MEDICINE

## 2022-01-19 PROCEDURE — G8427 DOCREV CUR MEDS BY ELIG CLIN: HCPCS | Performed by: INTERNAL MEDICINE

## 2022-01-19 PROCEDURE — 1111F DSCHRG MED/CURRENT MED MERGE: CPT | Performed by: INTERNAL MEDICINE

## 2022-01-19 PROCEDURE — G8419 CALC BMI OUT NRM PARAM NOF/U: HCPCS | Performed by: INTERNAL MEDICINE

## 2022-01-19 NOTE — PROGRESS NOTES
regurgitation  Aortic valve: Normal color flow   Pulmonic valve: Normal Color Flow   Left ventricular diastolic function is normal for age    Conclusion:    Technically satisfactory Echocardiography   Normal Left Ventricular size and function, diastolic function is normal   Normal Aortic Root   No intracardiac masses or thrombi   Mild concentric left ventricular hypertrophy noted.  Normal Right ventricle Size and function.  Left ventricular ejection fraction est 65%   Normal Mitral Valve  Aortic valve. with small echogenic structure on the ventricular surface, suspicious for vegetation   Normal Tricuspid valve.  Trace mitral regurgitation   Trace to mild tricuspid regurgitation   Normal color flow across Aortic and pulmonic valve   Normal left Ventricular Diastolic function.  Trivial circumferential pericardial effusion noted without tamponade physiology   Estimated right ventricular systolic pressure 40 mmHg   Normal LA Size     Clinical correlation needed with respect to the aortic valve. Electronically signed by Celia Villalobos DO on 12/9/2021 at 10:46 AM.      Current Outpatient Medications:     Blood Glucose Monitoring Suppl (TRUE METRIX METER) w/Device KIT, 1 kit by Does not apply route 4 times daily (before meals and nightly), Disp: 1 kit, Rfl: 0    blood glucose test strips (TRUE METRIX BLOOD GLUCOSE TEST) strip, 1 each by In Vitro route 4 times daily (with meals and nightly) As needed. , Disp: 200 each, Rfl: 3    Lancets MISC, 1 each by Does not apply route 4 times daily, Disp: 200 each, Rfl: 0    sodium bicarbonate 650 MG tablet, Take 2 tablets by mouth 3 times daily, Disp: 90 tablet, Rfl: 1    amLODIPine (NORVASC) 10 MG tablet, Take 1 tablet by mouth daily, Disp: 30 tablet, Rfl: 3    metOLazone (ZAROXOLYN) 5 MG tablet, Take 1 tablet by mouth 2 times daily, Disp: 60 tablet, Rfl: 1    spironolactone (ALDACTONE) 25 MG tablet, Take 1 tablet by mouth daily, Disp: 30 tablet, Rfl: 3    torsemide (DEMADEX) 100 MG tablet, Take 1 tablet by mouth daily, Disp: 30 tablet, Rfl: 3    insulin glargine (LANTUS) 100 UNIT/ML injection vial, Inject 20 Units into the skin 2 times daily Every morning and every evening, Disp: , Rfl:     pregabalin (LYRICA) 100 MG capsule, Take 200 mg by mouth 2 times daily. , Disp: , Rfl:     QUEtiapine (SEROQUEL) 25 MG tablet, Take 25 mg by mouth nightly, Disp: , Rfl:     FLUoxetine (PROZAC) 20 MG capsule, Take 20 mg by mouth nightly, Disp: , Rfl:     FERROUS SULFATE PO, Take 324 mg by mouth every other day, Disp: , Rfl:     Past Medical History  Noe Capps  has a past medical history of Diabetes ([facility] [address] Foot drop, left foot, Kidney failure, and Neuropathy. Social History  Noe Capps  reports that he has been smoking cigarettes. He has never used smokeless tobacco. He reports current drug use. Drug: Marijuana George Funk). He reports that he does not drink alcohol. Family History  Noe Capps family history is not on file. There is no family history of bicuspid aortic valve, aneurysms, heart transplant, pacemakers, defibrillators, or sudden cardiac death. Past Surgical History   Past Surgical History:   Procedure Laterality Date    IR TUNNELED CATHETER PLACEMENT GREATER THAN 5 YEARS  12/23/2021    IR TUNNELED CATHETER PLACEMENT GREATER THAN 5 YEARS 12/23/2021 [facility]    TUNNELED CENTRAL VENOUS CATHETER W/O SUBCUTANEOUS PORT Right 12/23/2021    Powerline, cut at 24, inserted by Dr. Garcia        Review of Systems   Constitutional: Negative for chills and fever  HENT: Negative for congestion, sinus pressure, sneezing and sore throat. Eyes: Negative for pain, discharge, redness and itching. Respiratory: Negative for apnea, cough  Gastrointestinal: Negative for blood in stool, constipation, diarrhea   Endocrine: Negative for cold intolerance, heat intolerance, polydipsia. Genitourinary: Negative for dysuria, enuresis, flank pain and hematuria. Musculoskeletal: Negative for arthralgias, joint swelling and neck pain. Neurological: Negative for numbness and headaches. Psychiatric/Behavioral: Negative for agitation, confusion, decreased concentration and dysphoric mood. Objective:     Ht 5' 3\" (1.6 m)   Wt 146 lb (66.2 kg)   BMI 25.86 kg/m²     Wt Readings from Last 3 Encounters:   01/19/22 146 lb (66.2 kg)   12/23/21 140 lb 10.5 oz (63.8 kg)   12/14/21 115 lb (52.2 kg)     BP Readings from Last 3 Encounters:   12/23/21 (!) 164/83   12/15/21 (!) 143/85       Nursing note and vitals reviewed. Physical Exam   Constitutional: Oriented to person, place, and time. Appears well-developed and well-nourished. ENT: Moist mucous membranes. No bleeding. Tongue is midline. Head: Normocephalic and atraumatic. Eyes: EOM are normal. Pupils are equal, round, and reactive to light. Neck: Normal range of motion. Neck supple. No JVD present. Cardiovascular: Normal rate, regular rhythm, II/VI diastolic murmur, no rubs, and intact distal pulses. Pulmonary/Chest: Effort normal and breath sounds normal. No respiratory distress. No wheezes. No rales. Abdominal: Soft. Bowel sounds are normal. No distension. There is no tenderness. Musculoskeletal: Normal range of motion. +1 edema. Neurological: Alert and oriented to person, place, and time. No cranial nerve deficit. Coordination normal.   Skin: Skin is warm and dry. Psychiatric: Normal mood and affect.        No results found for: CKTOTAL, CKMB, CKMBINDEX    Lab Results   Component Value Date    WBC 6.4 01/03/2022    RBC 2.43 01/03/2022    HGB 7.2 01/03/2022    HCT 21.4 01/03/2022    MCV 88.0 01/03/2022    MCH 29.6 01/03/2022    MCHC 33.6 01/03/2022    RDW 15.8 01/03/2022     01/03/2022    MPV 9.4 12/23/2021       Lab Results   Component Value Date     01/03/2022    K 4.4 01/03/2022    K 4.3 12/20/2021    CL 94 01/03/2022    CO2 25 01/03/2022     01/03/2022    LABALBU 2.4 12/23/2021    CREATININE 5.8 01/03/2022    CALCIUM 8.2 01/03/2022    GFRAA 15 12/23/2021    LABGLOM 12 12/23/2021    LABGLOM 15 12/15/2021    GLUCOSE 76 01/03/2022       Lab Results   Component Value Date    ALKPHOS 77 12/23/2021    ALT 7 12/23/2021    AST 36 12/23/2021    PROT 5.5 12/23/2021    BILITOT <0.2 12/23/2021    BILIDIR <0.2 12/14/2021    LABALBU 2.4 12/23/2021       Lab Results   Component Value Date    MG 2.30 12/23/2021       Lab Results   Component Value Date    INR 0.88 12/23/2021    PROTIME 9.9 12/23/2021         Lab Results   Component Value Date    LABA1C 12.1 12/17/2021       No results found for: TRIG, HDL, LDLCALC, LDLDIRECT, LABVLDL    No results found for: TSH      Testing Reviewed:      I have individually reviewed the cardiac test below:    ECHO: No results found for this or any previous visit. AssessmentPlan:   Danna Higuera is a pleasant 27year old male patient who  has a past medical history of Diabetes ([facility] [address] Foot drop, left foot, Kidney failure, and Neuropathy. . He admits to Methamphetamine abuse. The patient was recently admitted to OSH with AMS, MONICA/CKD, DKA. Patient required intubation. During his hospital stay, the patient was diagnosed with MSSA Bacteremia, Endocarditis. TTE report reviewed, EF is preserved, a small echogenic mass on aortic valve was noticed. Hutchinson catheter was placed. He was discharged home with plan to to be treated with IV Vancomycin. On 12/2021, he was admitted to [facility]. Cardiology was consulted for aortic valve endocarditis with concern for CHF, ?worsening valvular heart disease. Patient was transferred to [facility] due to lack of beds. He completed IV Abx Rx ~ 2 weeks ago. The patient denies fever. Patient denies chest pain, shortness of breath. He has dyspnea on exertion. He reports leg swelling. He is compliant with his medications, including diuretics. He is scheduled to see Nephrology on Friday. He denies dizziness.    1. Aortic valve vegetation  2. MSSA Bacteremia   3. Infective Endocarditis   4. Troponin elevation   5. MONICA  6. CKD  7. Hyperkalemia  8. Edema/Anasarca  9. ?CHF  10. DM  11. Anemia      Echo from OSH reviewed   [address] [facility] Will obtain a full echocardiogram to reassess post Abx Rx   He denies fever   Patient has MONICA/CKD   Leg swelling is improving   On diuretics   Will FU with Nephrology    Smoking: discussed with the patient the importance of smoke cessation especially with the risk of CAD   The patient was instructed to check the blood pressure at home, and record the readings. Patient will call office with blood pressure readings, will adjust patient's antihypertensive medications as needed accordingly       Above findings and plan of care were discussed with patient in details, patient's questions were answered.      Disposition:  RTC in 12 months             Electronically signed by Lani Harrington MD, [facility], [place]    1/19/2022 at 4:10 PM EST

## 2022-01-21 ENCOUNTER — OFFICE VISIT (OUTPATIENT)
Dept: NEPHROLOGY | Age: 27
End: 2022-01-21
Payer: MEDICARE

## 2022-01-21 VITALS
OXYGEN SATURATION: 96 % | DIASTOLIC BLOOD PRESSURE: 79 MMHG | SYSTOLIC BLOOD PRESSURE: 139 MMHG | BODY MASS INDEX: 26.22 KG/M2 | WEIGHT: 148 LBS | HEART RATE: 95 BPM | TEMPERATURE: 91.4 F

## 2022-01-21 DIAGNOSIS — N18.5 ANEMIA IN STAGE 5 CHRONIC KIDNEY DISEASE, NOT ON CHRONIC DIALYSIS (HCC): ICD-10-CM

## 2022-01-21 DIAGNOSIS — N18.5 CKD (CHRONIC KIDNEY DISEASE) STAGE 5, GFR LESS THAN 15 ML/MIN (HCC): Primary | ICD-10-CM

## 2022-01-21 DIAGNOSIS — E10.21 DIABETIC NEPHROPATHY ASSOCIATED WITH TYPE 1 DIABETES MELLITUS (HCC): ICD-10-CM

## 2022-01-21 DIAGNOSIS — I10 BENIGN ESSENTIAL HTN: ICD-10-CM

## 2022-01-21 DIAGNOSIS — D63.1 ANEMIA IN STAGE 5 CHRONIC KIDNEY DISEASE, NOT ON CHRONIC DIALYSIS (HCC): ICD-10-CM

## 2022-01-21 PROCEDURE — 3046F HEMOGLOBIN A1C LEVEL >9.0%: CPT | Performed by: INTERNAL MEDICINE

## 2022-01-21 PROCEDURE — G8427 DOCREV CUR MEDS BY ELIG CLIN: HCPCS | Performed by: INTERNAL MEDICINE

## 2022-01-21 PROCEDURE — 4004F PT TOBACCO SCREEN RCVD TLK: CPT | Performed by: INTERNAL MEDICINE

## 2022-01-21 PROCEDURE — 2022F DILAT RTA XM EVC RTNOPTHY: CPT | Performed by: INTERNAL MEDICINE

## 2022-01-21 PROCEDURE — G8484 FLU IMMUNIZE NO ADMIN: HCPCS | Performed by: INTERNAL MEDICINE

## 2022-01-21 PROCEDURE — 1111F DSCHRG MED/CURRENT MED MERGE: CPT | Performed by: INTERNAL MEDICINE

## 2022-01-21 PROCEDURE — G8419 CALC BMI OUT NRM PARAM NOF/U: HCPCS | Performed by: INTERNAL MEDICINE

## 2022-01-21 PROCEDURE — 99203 OFFICE O/P NEW LOW 30 MIN: CPT | Performed by: INTERNAL MEDICINE

## 2022-01-21 NOTE — PROGRESS NOTES
[facility] [address]  Dept: [phone]  Loc: [phone]  Outpatient Consult Note  1/21/2022 10:28 AM        Pt Name:    Esthela Cotto  YOB: 1995  Primary Care Physician:  No primary care provider on file. Chief Complaint:   Chief Complaint   Patient presents with   [facility] Patient     Dr. Sara Roy abnormal labs for new patient referral--- advanced chronic kidney disease        Background Information/HPI   The patient is 27 white male type I DM, CKD, recent endocarditis who was admitted at a hospital in [place] whom I am seeing for first time for CKD. Patient reports he is originally from [place]. Parents and his immediate family are all in [place]. Patient was following with a nephrologist in [place]. He was also seen by nephrology in [place]. Patient reports hx DM dxed age 6, CVA, Seizure. He says this was at the time when his sugars were over 2000 in [place]. He reports hx illicit drug use (metamphetamine). No IVDA. He reports he became homeless in [place] and so moved to [place] to live with a cousin. Patient was recently at [facility] with high sugars and sepsis. He was transferred to [facility] in [place]. He smokes 5-6 cigarettes a day. He was treated with IV antibiotics for aortic valve endocarditis. He had MSSA bacteremia. He was also seen by urology for dependent scrotal edema. He was initially placed on vancomycin but later was changed to ancef daily until Jan 8. He says he saw Dr. Sara Roy (infectious disease). Patient denies any NSAIDs use but has taken some. Sugars are reported to be \"good\" but did not bring a log. He says it is between . BP at home is not known because he has not been checking it. He did not bring his medications list. Did not bring his medications. Appears to have poor insight and judgment at times. Discussed with patient in detail.   Also not certain about compliance      Allergies:  Patient has no known allergies. Past Medical History:  Past Medical History:   Diagnosis Date    Diabetes ([address]     Foot drop, left foot     Kidney failure     STAGE 4    Neuropathy         Past Surgical History:  Past Surgical History:   Procedure Laterality Date    IR TUNNELED CATHETER PLACEMENT GREATER THAN 5 YEARS  12/23/2021    IR TUNNELED CATHETER PLACEMENT GREATER THAN 5 YEARS 12/23/2021 [facility]    TUNNELED CENTRAL VENOUS CATHETER W/O SUBCUTANEOUS PORT Right 12/23/2021    Powerline, cut at 24, inserted by Dr. Armando Buitrago        Family History:  Uncle and Brother--DM       Social History:  Social History     Socioeconomic History    Marital status: Single     Spouse name: Not on file    Number of children: Not on file    Years of education: Not on file    Highest education level: Not on file   Occupational History    Not on file   Tobacco Use    Smoking status: Current Some Day Smoker     Types: Cigarettes    Smokeless tobacco: Never Used   Vaping Use    Vaping Use: Every day   Substance and Sexual Activity    Alcohol use: Never    Drug use: Yes     Types: Marijuana [place])    Sexual activity: Not Currently   Other Topics Concern    Not on file   Social History Narrative    Not on file     Social Determinants of Health     Financial Resource Strain:     Difficulty of Paying Living Expenses: Not on file   Food Insecurity:     Worried About Running Out of Food in the Last Year: Not on file    [place] of Food in the Last Year: Not on file   Transportation Needs:     Lack of Transportation (Medical): Not on file    Lack of Transportation (Non-Medical):  Not on file   Physical Activity:     Days of Exercise per Week: Not on file    Minutes of Exercise per Session: Not on file   Stress:     Feeling of Stress : Not on file   Social Connections:     Frequency of Communication with Friends and Family: Not on file    Frequency of Social Gatherings with Friends and Family: Not on file    Attends Hindu Services: Not on file    Active Member of Clubs or Organizations: Not on file    Attends Club or Organization Meetings: Not on file    Marital Status: Not on file   Intimate Partner Violence:     Fear of Current or Ex-Partner: Not on file    Emotionally Abused: Not on file    Physically Abused: Not on file    Sexually Abused: Not on file   Housing Stability:     Unable to Pay for Housing in the Last Year: Not on file    Number of [place] in the Last Year: Not on file    Unstable Housing in the Last Year: Not on file   Hx substance use  Current smoking     Review of Systems:  Constitutional: no fever or chills  Head: No headaches  Eyes: no blurry vision, no discharge  Ears: no ear pain or hearing changes  Nose: no runny nose or epistaxis  Respiratory: +chronic shortness of breath  Cardiovascular: no chest pain  GI: no nausea, no vomiting or diarrhea  : denies any discharge  Skin: no rash  Musculoskeletal: no joint pain, moves all ext  Neuro: no numbness or tingling, no slurred speech  Psychiatric: stable mood, no depression or insomnia    All other review of systems were reviewed and negative     Home Medications:  Prior to Admission medications    Medication Sig Start Date End Date Taking? Authorizing Provider   Blood Glucose Monitoring Suppl (TRUE METRIX METER) w/Device KIT 1 kit by Does not apply route 4 times daily (before meals and nightly) 12/23/21  Yes Mani Aguero MD   blood glucose test strips (TRUE METRIX BLOOD GLUCOSE TEST) strip 1 each by In Vitro route 4 times daily (with meals and nightly) As needed.  12/23/21  Yes Mani Aguero MD   Lancets MISC 1 each by Does not apply route 4 times daily 12/23/21  Yes Mani Aguero MD   sodium bicarbonate 650 MG tablet Take 2 tablets by mouth 3 times daily 12/23/21  Yes Mani Aguero MD   amLODIPine (NORVASC) 10 MG tablet Take 1 tablet by mouth daily 12/24/21  Yes Chayo Garcia MD   metOLazone (ZAROXOLYN) 5 MG tablet Take 1 tablet by mouth 2 times daily 12/23/21  Yes Chayo Garcia MD   spironolactone (ALDACTONE) 25 MG tablet Take 1 tablet by mouth daily 12/24/21  Yes Chayo Garcia MD   torsemide (DEMADEX) 100 MG tablet Take 1 tablet by mouth daily 12/23/21  Yes Arias Marquez MD   insulin glargine (LANTUS) 100 UNIT/ML injection vial Inject 20 Units into the skin 2 times daily Every morning and every evening   Yes Historical Provider, MD   pregabalin (LYRICA) 100 MG capsule Take 200 mg by mouth 2 times daily. Yes Historical Provider, MD   QUEtiapine (SEROQUEL) 25 MG tablet Take 25 mg by mouth nightly   Yes Historical Provider, MD   FLUoxetine (PROZAC) 20 MG capsule Take 20 mg by mouth nightly   Yes Historical Provider, MD   FERROUS SULFATE PO Take 324 mg by mouth every other day   Yes Historical Provider, MD        Physical Examination:  VITALS:  /79 (Site: Right Upper Arm, Position: Sitting, Cuff Size: Medium Adult)   Pulse 95   Temp (!) 91.4 °F (33 °C)   Wt 148 lb (67.1 kg)   SpO2 96%   BMI 26.22 kg/m²   Body mass index is 26.22 kg/m².   Wt Readings from Last 3 Encounters:   01/21/22 148 lb (67.1 kg)   01/19/22 146 lb (66.2 kg)   12/23/21 140 lb 10.5 oz (63.8 kg)     Constitutional and General Appearance: alert and cooperative with exam, appears comfortable, no distress, not diaphoretic  Eyes: no icteric sclera in left eye or right eye,  +pallor conjunctiva  Ears and Nose: normal external appearance of left and right ear  Oral: moist oral mucus membranes  Neck: No jugular venous distention  Lungs: Air entry decreased  Heart: S1, S2  Extremities: trace LE edema, no tenderness, Left foot drop brace  GI: soft, non-tender, no guarding, no distention  Skin: no rash seen on exposed extremities, warm to touch  Musculo: moves all extremities  Neuro: no slurred speech, no facial drooping, symmetric strength  Psychiatric: Normal mood and affect, Not agitated     Laboratory & Diagnostics:  Old progress notes from referring physician reviewed. Radiology/US kidneys:   Echo:   Old labs reviewed:  Jan 2022: K 4.4, , Creat 5.8, Ca 8.2  Jan 2022: Hb 7.2     Impression/Plan:   1. CKD V secondary to Diabetic nephropathy likely. Reviewed old notes and nephrology notes from [place]. Patient with long standing hx DM nephropathy Type I. He understands he has advanced CKD at this time. He is very close to needing dialysis. I reviewed his notes and labs and his creatinine has been above 5 for over a month. His BUN is 105. He denies any nausea, vomiting. He reports appetite is \"pretty good. \" He is still urinating. He denies any shortness of breath at rest. Will follow labs closely. I discussed with him that he could need dialysis at any time. He says he wants to think. He says he is planning to go back to [place] on Feb 7. He wants to wait until he gets back to [place] to be with his family. He says he feels okay. Discussed with him that will need to do labs on weekly basis to ensure stability. If need be, will start dialysis sooner. If labs remains stable and he does not develop any new problems such as worsening fluid overload, shortness of breath etc.. then could wait. He understood my thoughts clearly. For now need to stop aldactone (very high risk of hyperkalemia in setting of DM and CKD). Discussed some symptoms that patient need to watch. Thought process reviewed in detail. He was understanding. Check labs now. 2. DM nephropathy, type I: Will need to monitor sugars closely. He does not have a family doctor. He does not want me to refer him to a family doctor either. 3. Nephrotic range proteinuria: Secondary to diabetic nephropathy. 4. HTN: advised low salt diet, monitor BP at home. Blood pressure 139/79. On Norvasc and diuretics. 5. Anemia: check iron studies, add OLGA if iron stores are appropriate  6.  Recent AV infective endocarditis. Completed IV antibiotic treatment per infectious disease  7. Neuropathy secondary to diabetes mellitus. Advised good sugar control to minimize progression of neuropathy and nephropathy  8. Recent MSSA bacteremia. As above  9. Edema. On metolazone and Demadex. Again, had very long discussion with patient. He understands he is very close to starting dialysis. He was educated about home therapy, PD, home hemo and in-center dialysis. He was offered to discuss home modality with a nurse. He wants to start dialysis in [place] however. I educated him to monitor his BP, sugars, weights, volume status closely and to call or go to ED if any worsening shortness of breath, fever, leg swelling etc..he was also advised to call my office if any of the above symptoms develop. Orders Placed This Encounter   Procedures    Basic Metabolic Panel    CBC    PTH, Intact    Phosphorus    Vitamin D 25 Hydroxy    Urinalysis    Protein / creatinine ratio, urine    Iron    Iron Binding Capacity    IRON SATURATION    Ferritin    Vitamin B12 & Folate     Return in about 2 weeks (around 2/4/2022).     Thought process was discussed with the patient  Thank you for the referral  Please do not hesitate to contact me if you have any questions or concerns  I will make further recommendations depending on clinical course and lab/diagnostic results      Rodrigo Artis MD  [facility]

## 2022-01-24 ENCOUNTER — HOSPITAL ENCOUNTER (OUTPATIENT)
Dept: NON INVASIVE DIAGNOSTICS | Age: 27
Discharge: HOME | End: 2022-01-24
Payer: MEDICARE

## 2022-01-24 DIAGNOSIS — I35.1 NONRHEUMATIC AORTIC VALVE INSUFFICIENCY: ICD-10-CM

## 2022-01-24 DIAGNOSIS — I35.8 AORTIC VALVE ENDOCARDITIS: ICD-10-CM

## 2022-01-24 LAB
LV EF: 55 %
LVEF MODALITY: NORMAL

## 2022-01-24 PROCEDURE — 93306 TTE W/DOPPLER COMPLETE: CPT

## 2022-02-01 ENCOUNTER — OFFICE VISIT (OUTPATIENT)
Dept: NEPHROLOGY | Age: 27
End: 2022-02-01
Payer: MEDICARE

## 2022-02-01 VITALS
DIASTOLIC BLOOD PRESSURE: 81 MMHG | SYSTOLIC BLOOD PRESSURE: 155 MMHG | TEMPERATURE: 98 F | BODY MASS INDEX: 25.69 KG/M2 | OXYGEN SATURATION: 99 % | HEART RATE: 90 BPM | WEIGHT: 145 LBS

## 2022-02-01 DIAGNOSIS — N18.5 CKD (CHRONIC KIDNEY DISEASE) STAGE 5, GFR LESS THAN 15 ML/MIN (HCC): Primary | ICD-10-CM

## 2022-02-01 DIAGNOSIS — N18.5 ANEMIA IN STAGE 5 CHRONIC KIDNEY DISEASE, NOT ON CHRONIC DIALYSIS (HCC): ICD-10-CM

## 2022-02-01 DIAGNOSIS — E10.21 DIABETIC NEPHROPATHY ASSOCIATED WITH TYPE 1 DIABETES MELLITUS (HCC): ICD-10-CM

## 2022-02-01 DIAGNOSIS — D63.1 ANEMIA IN STAGE 5 CHRONIC KIDNEY DISEASE, NOT ON CHRONIC DIALYSIS (HCC): ICD-10-CM

## 2022-02-01 PROCEDURE — 3046F HEMOGLOBIN A1C LEVEL >9.0%: CPT | Performed by: INTERNAL MEDICINE

## 2022-02-01 PROCEDURE — 2022F DILAT RTA XM EVC RTNOPTHY: CPT | Performed by: INTERNAL MEDICINE

## 2022-02-01 PROCEDURE — 4004F PT TOBACCO SCREEN RCVD TLK: CPT | Performed by: INTERNAL MEDICINE

## 2022-02-01 PROCEDURE — G8419 CALC BMI OUT NRM PARAM NOF/U: HCPCS | Performed by: INTERNAL MEDICINE

## 2022-02-01 PROCEDURE — G8427 DOCREV CUR MEDS BY ELIG CLIN: HCPCS | Performed by: INTERNAL MEDICINE

## 2022-02-01 PROCEDURE — 99214 OFFICE O/P EST MOD 30 MIN: CPT | Performed by: INTERNAL MEDICINE

## 2022-02-01 PROCEDURE — G8484 FLU IMMUNIZE NO ADMIN: HCPCS | Performed by: INTERNAL MEDICINE

## 2022-02-01 NOTE — PROGRESS NOTES
[facility]  [address] [facility] [address]  Dept: [phone]  Loc: [phone]  Office Progress Note  2/1/2022 2:39 PM      Pt Name:    Aly Merino:    1995  Primary Care Physician:  Martha Garcia MD     Chief Complaint: CKD V     Background Information/Interval History:   Patient is 26 yo white male with type I DM, diabetic nephropathy, CKD V, hx previous endocarditis, CVA, seizures, previous illicit drug abuse who is here for follow-up. I saw him for first time on Jan 21. He is from [place] and has known CKD and now progressive in nature. He was supposed to be get labs drawn for this visit but he did not get any done. Today he reports he is not planning to move to [place] and would like to start dialysis in [facility]. Patient is not very compliant. He is here alone. Did not bring any BP readings but says it is anywhere in 140-150. He says sugars are in  range. Past History:  Past Medical History:   Diagnosis Date    Diabetes ([address]     Foot drop, left foot     Kidney failure     STAGE 4    Neuropathy      Past Surgical History:   Procedure Laterality Date    IR TUNNELED CATHETER PLACEMENT GREATER THAN 5 YEARS  12/23/2021    IR TUNNELED CATHETER PLACEMENT GREATER THAN 5 YEARS 12/23/2021 [facility]    TUNNELED CENTRAL VENOUS CATHETER W/O SUBCUTANEOUS PORT Right 12/23/2021    Powerline, cut at 24, inserted by Dr. Noriega Go:  BP (!) 155/81 (Site: Left Upper Arm, Position: Sitting, Cuff Size: Medium Adult)   Pulse 90   Temp 98 °F (36.7 °C)   Wt 145 lb (65.8 kg)   SpO2 99%   BMI 25.69 kg/m²   Wt Readings from Last 3 Encounters:   02/01/22 145 lb (65.8 kg)   01/21/22 148 lb (67.1 kg)   01/19/22 146 lb (66.2 kg)     Body mass index is 25.69 kg/m².      General Appearance: alert and cooperative with exam, appears comfortable, no distress  Neck: No jugular venous distention  Lungs: Air entry B/L, no crackles or rales, no use of accessory muscles  Heart: S1, S2 heard  GI: soft, non-tender, no guarding  Extremities: B/L + edema     Medications:  Current Outpatient Medications   Medication Sig Dispense Refill    Blood Glucose Monitoring Suppl (TRUE METRIX METER) w/Device KIT 1 kit by Does not apply route 4 times daily (before meals and nightly) 1 kit 0    blood glucose test strips (TRUE METRIX BLOOD GLUCOSE TEST) strip 1 each by In Vitro route 4 times daily (with meals and nightly) As needed. 200 each 3    Lancets MISC 1 each by Does not apply route 4 times daily 200 each 0    sodium bicarbonate 650 MG tablet Take 2 tablets by mouth 3 times daily 90 tablet 1    amLODIPine (NORVASC) 10 MG tablet Take 1 tablet by mouth daily 30 tablet 3    metOLazone (ZAROXOLYN) 5 MG tablet Take 1 tablet by mouth 2 times daily 60 tablet 1    torsemide (DEMADEX) 100 MG tablet Take 1 tablet by mouth daily 30 tablet 3    insulin glargine (LANTUS) 100 UNIT/ML injection vial Inject 20 Units into the skin 2 times daily Every morning and every evening      pregabalin (LYRICA) 100 MG capsule Take 200 mg by mouth 2 times daily.  QUEtiapine (SEROQUEL) 25 MG tablet Take 25 mg by mouth nightly      FLUoxetine (PROZAC) 20 MG capsule Take 20 mg by mouth nightly      FERROUS SULFATE PO Take 324 mg by mouth every other day       No current facility-administered medications for this visit. Laboratory & Diagnostics:  Old labs  Jan 2022: K 4.4, , Creat 5.8     Impression/Plan:   1. CKD V: sec to DM nephropathy, Saw him for first office visit last week. Patient was supposed to get labs drawn for this week. He did not do any labs. Did not bring any sugars or  blood pressure readings either. Very noncompliant. I discussed with patient in detail. He has advanced CKD and is on the verge of needing dialysis. Need to check labs ASAP.   Patient agreed to go to [facility] to get labs drawn today. Depending on labs he may need to be admitted to the hospital for urgent initiation of dialysis. If electrolytes are stable then we will plan to start dialysis as outpatient. Patient is clinically very fluid overloaded. At this point I feel it is in the best interest of the patient to get started on dialysis for better volume and solute management. 2. Diabetic nephropathy secondary to type 1 diabetes. Now following with the PCP  3. Anemia in CKD. Check iron studies. Again patient was supposed to get labs drawn last week. No labs were done. Likely will need iron and/or OLGA. Hemoglobin was in the mid sevens last time. However no labs done in the last 1 month. 4. Volume overload. Likely will need to start renal replacement therapy. Discussed with patient in detail  5. Recent endocarditis. Completed IV antibiotic treatment per infectious disease. 6. Neuropathy secondary to DM  7. MSSA bacteremia: completed IV abx treatment. Patient did not get labs any drawn. He says he did not have transport available. He says he will get it done today. We need STAT labs. Patient understand he is very close to starting dialysis and we need labs soon to ensure he is stable. Discussed in detail. Need to check electrolytes to ensure potassium is stable. Need iron studies. Orders Placed This Encounter   Procedures    Basic Metabolic Panel    CBC    Hemoglobin and Hematocrit, Blood    PTH, Intact    Phosphorus    Vitamin D 25 Hydroxy    Urinalysis    Protein / creatinine ratio, urine    Iron    Iron Binding Capacity    IRON SATURATION    Ferritin     Return in about 1 week (around 2/8/2022).     Chaenll Franco MD  [facility]

## 2022-02-02 ENCOUNTER — TELEPHONE (OUTPATIENT)
Dept: NEPHROLOGY | Age: 27
End: 2022-02-02

## 2022-02-02 DIAGNOSIS — N18.6 ESRD (END STAGE RENAL DISEASE) (HCC): ICD-10-CM

## 2022-02-02 DIAGNOSIS — I38 ENDOCARDITIS, UNSPECIFIED CHRONICITY, UNSPECIFIED ENDOCARDITIS TYPE: Primary | ICD-10-CM

## 2022-02-02 NOTE — TELEPHONE ENCOUNTER
Spoke to pt and he will get labs done at [facility].     Labs being faxed to [facility]

## 2022-02-02 NOTE — TELEPHONE ENCOUNTER
----- Message from Arabella Marcus MD sent at 2/2/2022  9:56 AM EST -----  Spoke with Infectious disease. They recommend getting outpatient  Blood cultures x 2 sets. Patricia Taylor order this. See if patient can get it drawn today because results takes 5 days to come. Need to start him on dialysis soon.

## 2022-02-04 NOTE — TELEPHONE ENCOUNTER
Ok, pls have IR at [facility] insert tunneled hemodialysis catheter sometime next week Wednesday onward.    Pls send admission packet to Dandre Hopkins)

## 2022-02-04 NOTE — TELEPHONE ENCOUNTER
[address] [place] [address] to make sure that they did the blood cultures. Spoke to Imelda Cabrera in the lab dept and she informed me that the blood cultures were sent to [facility] on 2/3/22.

## 2022-02-04 NOTE — TELEPHONE ENCOUNTER
Called IR and left a message they are out due to the weather. Tang Silva has an appt next Friday, are we keeping it?

## 2022-02-07 NOTE — TELEPHONE ENCOUNTER
Faxed admission paperwork to [facility]. Called IR and left a message to get hemo cath scheduled.

## 2022-02-07 NOTE — TELEPHONE ENCOUNTER
Bernie Yepez from IR called and informed me that [facility] Hemo cath insert is scheduled for 2/14/22 at 9:30 am. She is going to tell pt. I informed Maurilio

## 2022-02-11 ENCOUNTER — OFFICE VISIT (OUTPATIENT)
Dept: NEPHROLOGY | Age: 27
End: 2022-02-11
Payer: MEDICARE

## 2022-02-11 VITALS
BODY MASS INDEX: 23.91 KG/M2 | HEART RATE: 91 BPM | WEIGHT: 135 LBS | DIASTOLIC BLOOD PRESSURE: 89 MMHG | TEMPERATURE: 97.7 F | SYSTOLIC BLOOD PRESSURE: 149 MMHG | OXYGEN SATURATION: 92 %

## 2022-02-11 DIAGNOSIS — E10.21 DIABETIC NEPHROPATHY ASSOCIATED WITH TYPE 1 DIABETES MELLITUS (HCC): ICD-10-CM

## 2022-02-11 DIAGNOSIS — D63.1 ANEMIA IN STAGE 5 CHRONIC KIDNEY DISEASE, NOT ON CHRONIC DIALYSIS (HCC): ICD-10-CM

## 2022-02-11 DIAGNOSIS — N18.5 CKD (CHRONIC KIDNEY DISEASE) STAGE 5, GFR LESS THAN 15 ML/MIN (HCC): Primary | ICD-10-CM

## 2022-02-11 DIAGNOSIS — N18.5 ANEMIA IN STAGE 5 CHRONIC KIDNEY DISEASE, NOT ON CHRONIC DIALYSIS (HCC): ICD-10-CM

## 2022-02-11 PROCEDURE — G8427 DOCREV CUR MEDS BY ELIG CLIN: HCPCS | Performed by: INTERNAL MEDICINE

## 2022-02-11 PROCEDURE — 3046F HEMOGLOBIN A1C LEVEL >9.0%: CPT | Performed by: INTERNAL MEDICINE

## 2022-02-11 PROCEDURE — G8420 CALC BMI NORM PARAMETERS: HCPCS | Performed by: INTERNAL MEDICINE

## 2022-02-11 PROCEDURE — 4004F PT TOBACCO SCREEN RCVD TLK: CPT | Performed by: INTERNAL MEDICINE

## 2022-02-11 PROCEDURE — 99213 OFFICE O/P EST LOW 20 MIN: CPT | Performed by: INTERNAL MEDICINE

## 2022-02-11 PROCEDURE — 2022F DILAT RTA XM EVC RTNOPTHY: CPT | Performed by: INTERNAL MEDICINE

## 2022-02-11 PROCEDURE — G8484 FLU IMMUNIZE NO ADMIN: HCPCS | Performed by: INTERNAL MEDICINE

## 2022-02-11 RX ORDER — MIDAZOLAM HYDROCHLORIDE 1 MG/ML
1 INJECTION INTRAMUSCULAR; INTRAVENOUS ONCE
Status: CANCELLED | OUTPATIENT
Start: 2022-02-11 | End: 2022-02-11

## 2022-02-11 RX ORDER — SODIUM CHLORIDE 450 MG/100ML
INJECTION, SOLUTION INTRAVENOUS CONTINUOUS
Status: CANCELLED | OUTPATIENT
Start: 2022-02-11

## 2022-02-11 NOTE — PROGRESS NOTES
[address] [address]  Dept: [phone]  Loc: [phone]  Office Progress Note  2/11/2022 12:18 PM      Pt Name:    Zuly Perez:    1995  Primary Care Physician:  Sarah Main MD     Chief Complaint: CKD V     Background Information/Interval History:   Patient is 26 yo white male with type I DM, diabetic nephropathy, CKD V, hx previous endocarditis, CVA, seizures, previous illicit drug abuse who is here for follow-up. I saw him for first time on Jan 21. He is from [place] and has known CKD and now progressive in nature. No shortness of breath, reports sugars are in 130. Does not really check BP at home. Compliance is an issue. No fever or chills. No nausea, vomiting. Reports he is taking his diuretics. Reports lower extremity edema. Denies dysuria       Past History:  Past Medical History:   Diagnosis Date    Diabetes ([address]     Foot drop, left foot     Kidney failure     STAGE 4    Neuropathy      Past Surgical History:   Procedure Laterality Date    IR TUNNELED CATHETER PLACEMENT GREATER THAN 5 YEARS  12/23/2021    IR TUNNELED CATHETER PLACEMENT GREATER THAN 5 YEARS 12/23/2021 [facility]    TUNNELED CENTRAL VENOUS CATHETER W/O SUBCUTANEOUS PORT Right 12/23/2021    Powerline, cut at 24, inserted by Dr. Cristobal Areas:  BP (!) 149/89 (Site: Right Upper Arm, Position: Sitting, Cuff Size: Medium Adult)   Pulse 91   Temp 97.7 °F (36.5 °C)   Wt 135 lb (61.2 kg)   SpO2 92%   BMI 23.91 kg/m²   Wt Readings from Last 3 Encounters:   02/11/22 135 lb (61.2 kg)   02/01/22 145 lb (65.8 kg)   01/21/22 148 lb (67.1 kg)     Body mass index is 23.91 kg/m².      General Appearance: alert and cooperative with exam, appears comfortable, no distress  Neck: No jugular venous distention  Lungs: Air entry B/L, no crackles or rales, no use of accessory muscles  Heart: S1, S2 heard  GI: soft, non-tender, no guarding  Extremities: B/L + edema     Medications:  Current Outpatient Medications   Medication Sig Dispense Refill    Blood Glucose Monitoring Suppl (TRUE METRIX METER) w/Device KIT 1 kit by Does not apply route 4 times daily (before meals and nightly) 1 kit 0    blood glucose test strips (TRUE METRIX BLOOD GLUCOSE TEST) strip 1 each by In Vitro route 4 times daily (with meals and nightly) As needed. 200 each 3    Lancets MISC 1 each by Does not apply route 4 times daily 200 each 0    sodium bicarbonate 650 MG tablet Take 2 tablets by mouth 3 times daily 90 tablet 1    amLODIPine (NORVASC) 10 MG tablet Take 1 tablet by mouth daily 30 tablet 3    metOLazone (ZAROXOLYN) 5 MG tablet Take 1 tablet by mouth 2 times daily 60 tablet 1    torsemide (DEMADEX) 100 MG tablet Take 1 tablet by mouth daily 30 tablet 3    insulin glargine (LANTUS) 100 UNIT/ML injection vial Inject 20 Units into the skin 2 times daily Every morning and every evening      pregabalin (LYRICA) 100 MG capsule Take 200 mg by mouth 2 times daily.  QUEtiapine (SEROQUEL) 25 MG tablet Take 25 mg by mouth nightly      FLUoxetine (PROZAC) 20 MG capsule Take 20 mg by mouth nightly      FERROUS SULFATE PO Take 324 mg by mouth every other day       No current facility-administered medications for this visit. Laboratory & Diagnostics:  Old labs  Jan 2022: K 4.4, , Creat 5.8  Feb 2022: K 4.3, Creat 6.8, Ca 8.0, Hb 8.3     Impression/Plan:   1. CKD V: sec to DM nephropathy. Still has lot of fluid in legs. No shortness of breath, scheduled for tunneled catheter placement next week. I called and spoke with [facility] staff, will start HD treatment on Monday afternoon after cath placement. Patient is agreeable. Patient understands he is to get tunneled catheter placed at [facility]  2. Diabetic nephropathy secondary to type 1 diabetes. 3. Anemia in CKD.   Will initiate mircera as outpatient in [facility]  4. Volume overload. Continue with demadex and metolazone. 5. Recent endocarditis. Completed IV antibiotic treatment per infectious disease. I spoke with ID last time. Blood cultures were done and they are negative. He is afebrile. 6. Neuropathy secondary to DM  7. MSSA bacteremia: completed IV abx treatment. Case discussed with him in detail. Patient voiced understanding. Discussed with [facility] staff. Discussed with patient expectations etc... with respect to dialysis. No follow-ups on file.     López Ac MD  [facility]

## 2022-02-14 ENCOUNTER — TELEPHONE (OUTPATIENT)
Dept: NEPHROLOGY | Age: 27
End: 2022-02-14

## 2022-02-14 NOTE — TELEPHONE ENCOUNTER
Medardo Reza called and informed us that his ride did not show so he can not get his cath placed today. Called IR and left a message to get cath insert rescheduled and spoke to Kieran White at [address] to let them know that he will not be there. Placed a call to [facility] and spoke to Chelle and let him know that pt will not be there today. Call back to [facility] is [phone].

## 2022-02-14 NOTE — TELEPHONE ENCOUNTER
Marycruz Santacruz at IR called and informed me that there are two dates 2/15 or 2/18 both are 11:30 arrival with a 1 pm procedure. Spoke to pt and he choose 2/18 for cath insert. Called IR and left a message to let them know what date pt choose. Called [facility] to make them aware. Spoke to Chelle at Dialysis and he informed me that they will have pt start Dialysis on the 2/21/22, unless you want him to get it started after procedure. Chelle said you can call him to discuss this if you want.

## 2022-02-17 RX ORDER — SODIUM CHLORIDE 450 MG/100ML
INJECTION, SOLUTION INTRAVENOUS CONTINUOUS
Status: CANCELLED | OUTPATIENT
Start: 2022-02-17

## 2022-02-17 RX ORDER — MIDAZOLAM HYDROCHLORIDE 1 MG/ML
1 INJECTION INTRAMUSCULAR; INTRAVENOUS ONCE
Status: CANCELLED | OUTPATIENT
Start: 2022-02-17 | End: 2022-02-17

## 2022-02-18 NOTE — TELEPHONE ENCOUNTER
Rakan Prasad from [employer] [facility] called to see if you want pt to start Dialysis today or Monday. He gets cath placement today.

## 2022-02-18 NOTE — TELEPHONE ENCOUNTER
I spoke with Trixie Casper, Manager at [facility] unit to see if  at [facility] HD unit can assist with transportation.

## 2022-02-22 RX ORDER — SODIUM CHLORIDE 450 MG/100ML
INJECTION, SOLUTION INTRAVENOUS CONTINUOUS
Status: CANCELLED | OUTPATIENT
Start: 2022-02-22

## 2022-02-22 RX ORDER — CLINDAMYCIN PHOSPHATE 600 MG/50ML
600 INJECTION INTRAVENOUS
Status: CANCELLED | OUTPATIENT
Start: 2022-02-22

## 2022-02-22 RX ORDER — MIDAZOLAM HYDROCHLORIDE 1 MG/ML
1 INJECTION INTRAMUSCULAR; INTRAVENOUS ONCE
Status: CANCELLED | OUTPATIENT
Start: 2022-02-22 | End: 2022-02-22

## 2022-02-23 ENCOUNTER — HOSPITAL ENCOUNTER (OUTPATIENT)
Dept: INTERVENTIONAL RADIOLOGY/VASCULAR | Age: 27
Discharge: HOME | End: 2022-02-23
Payer: MEDICARE

## 2022-02-23 VITALS
BODY MASS INDEX: 23.92 KG/M2 | SYSTOLIC BLOOD PRESSURE: 130 MMHG | HEART RATE: 80 BPM | HEIGHT: 63 IN | OXYGEN SATURATION: 96 % | DIASTOLIC BLOOD PRESSURE: 64 MMHG | WEIGHT: 135 LBS | TEMPERATURE: 97.6 F | RESPIRATION RATE: 18 BRPM

## 2022-02-23 DIAGNOSIS — N18.6 ESRD (END STAGE RENAL DISEASE) (HCC): ICD-10-CM

## 2022-02-23 LAB
ERYTHROCYTE [DISTWIDTH] IN BLOOD BY AUTOMATED COUNT: 12.9 % (ref 11.5–14.5)
ERYTHROCYTE [DISTWIDTH] IN BLOOD BY AUTOMATED COUNT: 39.3 FL (ref 35–45)
HCT VFR BLD CALC: 25.7 % (ref 42–52)
HEMOGLOBIN: 8.3 GM/DL (ref 14–18)
MCH RBC QN AUTO: 27.3 PG (ref 26–33)
MCHC RBC AUTO-ENTMCNC: 32.3 GM/DL (ref 32.2–35.5)
MCV RBC AUTO: 84.5 FL (ref 80–94)
PLATELET # BLD: 526 THOU/MM3 (ref 130–400)
PMV BLD AUTO: 10.6 FL (ref 9.4–12.4)
RBC # BLD: 3.04 MILL/MM3 (ref 4.7–6.1)
WBC # BLD: 9.6 THOU/MM3 (ref 4.8–10.8)

## 2022-02-23 PROCEDURE — 2580000003 HC RX 258: Performed by: RADIOLOGY

## 2022-02-23 PROCEDURE — 85027 COMPLETE CBC AUTOMATED: CPT

## 2022-02-23 PROCEDURE — 6360000002 HC RX W HCPCS: Performed by: RADIOLOGY

## 2022-02-23 PROCEDURE — 2709999900 IR FLUORO GUIDED CVA DEVICE PLMT/REPLACE/REMOVAL

## 2022-02-23 PROCEDURE — 77001 FLUOROGUIDE FOR VEIN DEVICE: CPT

## 2022-02-23 PROCEDURE — 2500000003 HC RX 250 WO HCPCS: Performed by: RADIOLOGY

## 2022-02-23 PROCEDURE — 36558 INSERT TUNNELED CV CATH: CPT

## 2022-02-23 RX ORDER — SODIUM CHLORIDE 450 MG/100ML
INJECTION, SOLUTION INTRAVENOUS CONTINUOUS
Status: DISCONTINUED | OUTPATIENT
Start: 2022-02-23 | End: 2022-02-24 | Stop reason: HOSPADM

## 2022-02-23 RX ORDER — CLINDAMYCIN PHOSPHATE 600 MG/50ML
600 INJECTION INTRAVENOUS
Status: COMPLETED | OUTPATIENT
Start: 2022-02-23 | End: 2022-02-23

## 2022-02-23 RX ORDER — MIDAZOLAM HYDROCHLORIDE 1 MG/ML
1 INJECTION INTRAMUSCULAR; INTRAVENOUS ONCE
Status: DISCONTINUED | OUTPATIENT
Start: 2022-02-23 | End: 2022-02-24 | Stop reason: HOSPADM

## 2022-02-23 RX ORDER — MIDAZOLAM HYDROCHLORIDE 1 MG/ML
INJECTION INTRAMUSCULAR; INTRAVENOUS
Status: COMPLETED | OUTPATIENT
Start: 2022-02-23 | End: 2022-02-23

## 2022-02-23 RX ORDER — LEVOTHYROXINE SODIUM 0.03 MG/1
25 TABLET ORAL DAILY
COMMUNITY

## 2022-02-23 RX ADMIN — CLINDAMYCIN PHOSPHATE 600 MG: 600 INJECTION, SOLUTION INTRAVENOUS at 09:49

## 2022-02-23 RX ADMIN — HYDROMORPHONE HYDROCHLORIDE 1 MG: 1 INJECTION, SOLUTION INTRAMUSCULAR; INTRAVENOUS; SUBCUTANEOUS at 11:23

## 2022-02-23 RX ADMIN — SODIUM CHLORIDE: 4.5 INJECTION, SOLUTION INTRAVENOUS at 09:43

## 2022-02-23 RX ADMIN — MIDAZOLAM 1 MG: 1 INJECTION INTRAMUSCULAR; INTRAVENOUS at 11:23

## 2022-02-23 ASSESSMENT — PAIN SCALES - GENERAL
PAINLEVEL_OUTOF10: 0

## 2022-02-23 ASSESSMENT — PAIN - FUNCTIONAL ASSESSMENT: PAIN_FUNCTIONAL_ASSESSMENT: 0-10

## 2022-02-23 NOTE — OP NOTE
Department of Radiology  Post Procedure Progress Note      Pre-Procedure Diagnosis:  Renal Failure    Procedure Performed: Dialysis Catheter insertion     Anesthesia: local , versed and dilaudid    Findings: successful    Immediate Complications:  None    Estimated Blood Loss: minimal    SEE DICTATED PROCEDURE NOTE FOR COMPLETE DETAILS.       Electronically signed by Cory Barthel, MD on 2/23/2022 at 11:41 AM

## 2022-02-23 NOTE — PROGRESS NOTES
1105 Patient received in IR for Tunneled Dialysis catheter. 1108 This procedure has been fully reviewed with the patient and written informed consent has been obtained. 1125 Procedure started with Dr. Yu Capo. 1126 Lidocaine given. 1139 Procedure completed; patient tolerated well. Bio-patch, op-site. 2x2, op-site; no bleeding noted. 1145 Patient on cart; comfort ensured. 1148 Patient taken to OPN via cart. Report called to [facility].

## 2022-02-23 NOTE — H&P
[address] [address]  Sedation/Analgesia History & Physical    Pt Name: Tino Yadav  MRN: [id number]  YOB: 1995  Provider Performing Procedure: Hemal Casillas MD, MD  Primary Care Physician: Pola Whittington MD    PRE-PROCEDURE   DNR-CCA/DNR-CC []Yes [x]No  Brief History/Pre-Procedure Diagnosis: Renal failure          MEDICAL HISTORY  []CAD/Valve  []Liver Disease  []Lung Disease [x]Diabetes  []Hypertension [x]Renal Disease  []Additional information:       has a past medical history of Diabetes ([facility] [place] [address] Foot drop, left foot, Kidney failure, Neuropathy, and Thyroid disease. SURGICAL HISTORY   has a past surgical history that includes TUNNELED CENTRAL VENOUS CATHETER W/O SUBCUTANEOUS PORT (Right, 12/23/2021) and IR TUNNELED CVC PLACE WO SQ PORT/PUMP > 5 YEARS (12/23/2021).   Additional information:       ALLERGIES   Allergies as of 02/23/2022    (No Known Allergies)     Additional information:       MEDICATIONS   Coumadin Use Last 5 Days [x]No []Yes  Antiplatelet drug therapy use last 5 days  [x]No []Yes  Other anticoagulant use last 5 days  [x]No []Yes    Current Outpatient Medications:     levothyroxine (SYNTHROID) 25 MCG tablet, Take 25 mcg by mouth Daily, Disp: , Rfl:     sodium bicarbonate 650 MG tablet, Take 2 tablets by mouth 3 times daily, Disp: 90 tablet, Rfl: 1    amLODIPine (NORVASC) 10 MG tablet, Take 1 tablet by mouth daily, Disp: 30 tablet, Rfl: 3    metOLazone (ZAROXOLYN) 5 MG tablet, Take 1 tablet by mouth 2 times daily, Disp: 60 tablet, Rfl: 1    torsemide (DEMADEX) 100 MG tablet, Take 1 tablet by mouth daily, Disp: 30 tablet, Rfl: 3    insulin glargine (LANTUS) 100 UNIT/ML injection vial, Inject 20 Units into the skin 2 times daily Every morning and every evening, Disp: , Rfl:     QUEtiapine (SEROQUEL) 25 MG tablet, Take 25 mg by mouth nightly, Disp: , Rfl:     FLUoxetine (PROZAC) 20 MG capsule, Take 20 mg by mouth nightly, Disp: , Rfl:     FERROUS SULFATE PO, Take 324 mg by mouth every other day, Disp: , Rfl:     Blood Glucose Monitoring Suppl (TRUE METRIX METER) w/Device KIT, 1 kit by Does not apply route 4 times daily (before meals and nightly), Disp: 1 kit, Rfl: 0    blood glucose test strips (TRUE METRIX BLOOD GLUCOSE TEST) strip, 1 each by In Vitro route 4 times daily (with meals and nightly) As needed. , Disp: 200 each, Rfl: 3    Lancets MISC, 1 each by Does not apply route 4 times daily, Disp: 200 each, Rfl: 0    pregabalin (LYRICA) 100 MG capsule, Take 200 mg by mouth 2 times daily. , Disp: , Rfl:     Current Facility-Administered Medications:     0.45 % sodium chloride infusion, , IntraVENous, Continuous, Delon Abarca MD, Last Rate: 20 mL/hr at 02/23/22 0943, New Bag at 02/23/22 0943    HYDROmorphone (DILAUDID) injection 1 mg, 1 mg, IntraVENous, Once, Delon Abarca MD    midazolam (VERSED) injection 1 mg, 1 mg, IntraVENous, Once, Delon Abarca MD  Prior to Admission medications    Medication Sig Start Date End Date Taking?  Authorizing Provider   levothyroxine (SYNTHROID) 25 MCG tablet Take 25 mcg by mouth Daily   Yes Historical Provider, MD   sodium bicarbonate 650 MG tablet Take 2 tablets by mouth 3 times daily 12/23/21  Yes Marquez Woodard MD   amLODIPine (NORVASC) 10 MG tablet Take 1 tablet by mouth daily 12/24/21  Yes Marquez Woodard MD   metOLazone (ZAROXOLYN) 5 MG tablet Take 1 tablet by mouth 2 times daily 12/23/21  Yes Marquez Woodard MD   torsemide (DEMADEX) 100 MG tablet Take 1 tablet by mouth daily 12/23/21  Yes Altamese MD Sheryl   insulin glargine (LANTUS) 100 UNIT/ML injection vial Inject 20 Units into the skin 2 times daily Every morning and every evening   Yes Historical Provider, MD   QUEtiapine (SEROQUEL) 25 MG tablet Take 25 mg by mouth nightly   Yes Historical Provider, MD   FLUoxetine (PROZAC) 20 MG capsule Take 20 mg by mouth nightly   Yes Historical Provider, MD   FERROUS SULFATE PO Take 324 mg by to nursing sedation/analgesia documentation record)  [x]Formulation and discussion of sedation/procedure plan, risks, and expectations with patient and/or responsible adult completed. [x]Patient examined immediately prior to the procedure.  (Refer to nursing sedation/analgesia documentation record)    Jada Lynn MD, MD  Electronically signed 2/23/2022 at 11:14 AM

## 2022-02-23 NOTE — PROGRESS NOTES
6253 Patient arrived to [facility] ambulatory for tunneled dialysis catheter insertion. Oriented to room and call light  PT RIGHTS AND RESPONSIBILITIES OFFERED TO PT. He states he does not take blood thinners. He last ate at 0700 d/t low blood sugar and his cousin is driving. Radiology updated    Blood work obtained and sent to lab and antibiotic started. He denies complaints    1150 Patient returned to [facility]. Dressing is clean, dry, intact. He denies pain. Vitals as charted    1205 dressing is clean, dry, intact. He denies pain. Snack and drink provided    1220 dressing is clean, dry, intact. He denies pain    1235 dressing is clean, dry, intact. He denies pain    1240 iv removed. Discharge instructions given and explained and he denies questions.   Discharged in wheel chair       _M___ Safety:       (Environmental)  Jessica Stare to environment   Ensure ID band is correct and in place/ allergy band as needed   Assess for fall risk   Initiate fall precautions as applicable (fall band, side rails, etc.)   Call light within reach   Bed in low position/ wheels locked    _M___ Pain:        Assess pain level and characteristics   Administer analgesics as ordered   Assess effectiveness of pain management and report to MD as needed    _M___ Knowledge Deficit:   Assess baseline knowledge   Provide teaching at level of understanding   Provide teaching via preferred learning method   Evaluate teaching effectiveness    _M___ Hemodynamic/Respiratory Status:       (Pre and Post Procedure Monitoring)   Assess/Monitor vital signs and LOC   Assess Baseline SpO2 prior to any sedation   Obtain weight/height   Assess vital signs/ LOC until patient meets discharge criteria   Monitor procedure site and notify MD of any issues

## 2022-02-23 NOTE — H&P
Formulation and discussion of sedation / procedure plans, risks, benefits, side effects and alternatives with patient and/or responsible adult completed.     Electronically signed by Jed Barney MD on 2/23/2022 at 11:14 AM

## 2022-03-18 PROBLEM — R20.9 SENSORY DISTURBANCE: Status: ACTIVE | Noted: 2017-12-01

## 2022-03-18 PROBLEM — F32.3 MAJOR DEPRESSIVE DISORDER, SINGLE EPISODE, SEVERE WITH PSYCHOTIC FEATURES (HCC): Status: ACTIVE | Noted: 2017-12-01

## 2022-03-19 PROBLEM — F34.1 PERSISTENT DEPRESSIVE DISORDER: Status: ACTIVE | Noted: 2017-12-01

## 2022-03-19 PROBLEM — F91.3 OPPOSITIONAL DISORDER: Status: ACTIVE | Noted: 2017-12-01

## 2022-03-19 PROBLEM — M72.2 PLANTAR FASCIITIS: Status: ACTIVE | Noted: 2017-12-01

## 2022-03-19 PROBLEM — R80.9 PROTEINURIA: Status: ACTIVE | Noted: 2017-12-04

## 2022-03-19 PROBLEM — E10.21 TYPE 1 DIABETES MELLITUS WITH NEPHROPATHY (HCC): Status: ACTIVE | Noted: 2017-12-01

## 2022-03-19 PROBLEM — E78.5 HYPERLIPIDEMIA: Status: ACTIVE | Noted: 2017-12-01

## 2022-03-19 PROBLEM — F43.29 ADJUSTMENT DISORDER WITH MIXED EMOTIONAL FEATURES: Status: ACTIVE | Noted: 2017-12-01

## 2022-03-19 PROBLEM — F90.9 ADHD: Status: ACTIVE | Noted: 2017-12-01

## 2022-03-19 PROBLEM — G62.9 PERIPHERAL NEUROPATHY: Status: ACTIVE | Noted: 2017-12-01

## 2022-03-20 PROBLEM — Z59.00 HOMELESS: Status: ACTIVE | Noted: 2017-12-01

## 2022-03-20 PROBLEM — F41.9 ANXIETY DISORDER: Status: ACTIVE | Noted: 2017-12-01

## 2022-03-25 ENCOUNTER — HOSPITAL ENCOUNTER (OUTPATIENT)
Dept: INTERVENTIONAL RADIOLOGY/VASCULAR | Age: 27
Discharge: HOME | End: 2022-03-25
Payer: MEDICARE

## 2022-03-25 DIAGNOSIS — Z99.2 DEPENDENCE ON RENAL DIALYSIS (HCC): ICD-10-CM

## 2022-03-25 DIAGNOSIS — N18.6 END STAGE RENAL DISEASE (HCC): ICD-10-CM

## 2022-03-25 PROCEDURE — 93971 EXTREMITY STUDY: CPT

## 2022-03-29 ENCOUNTER — OFFICE VISIT (OUTPATIENT)
Dept: CARDIOTHORACIC SURGERY | Age: 27
End: 2022-03-29
Payer: MEDICARE

## 2022-03-29 ENCOUNTER — TELEPHONE (OUTPATIENT)
Dept: CARDIOTHORACIC SURGERY | Age: 27
End: 2022-03-29

## 2022-03-29 VITALS
HEIGHT: 63 IN | WEIGHT: 120 LBS | SYSTOLIC BLOOD PRESSURE: 132 MMHG | DIASTOLIC BLOOD PRESSURE: 80 MMHG | HEART RATE: 79 BPM | BODY MASS INDEX: 21.26 KG/M2

## 2022-03-29 DIAGNOSIS — Z99.2 STAGE 5 CHRONIC KIDNEY DISEASE ON CHRONIC DIALYSIS (HCC): Primary | ICD-10-CM

## 2022-03-29 DIAGNOSIS — N18.6 STAGE 5 CHRONIC KIDNEY DISEASE ON CHRONIC DIALYSIS (HCC): Primary | ICD-10-CM

## 2022-03-29 PROCEDURE — 99204 OFFICE O/P NEW MOD 45 MIN: CPT | Performed by: THORACIC SURGERY (CARDIOTHORACIC VASCULAR SURGERY)

## 2022-03-29 PROCEDURE — 4004F PT TOBACCO SCREEN RCVD TLK: CPT | Performed by: THORACIC SURGERY (CARDIOTHORACIC VASCULAR SURGERY)

## 2022-03-29 PROCEDURE — G8427 DOCREV CUR MEDS BY ELIG CLIN: HCPCS | Performed by: THORACIC SURGERY (CARDIOTHORACIC VASCULAR SURGERY)

## 2022-03-29 PROCEDURE — G8484 FLU IMMUNIZE NO ADMIN: HCPCS | Performed by: THORACIC SURGERY (CARDIOTHORACIC VASCULAR SURGERY)

## 2022-03-29 PROCEDURE — G8420 CALC BMI NORM PARAMETERS: HCPCS | Performed by: THORACIC SURGERY (CARDIOTHORACIC VASCULAR SURGERY)

## 2022-03-29 NOTE — PROGRESS NOTES
CT/CV Surgery New Patient Office Visit      Patient's Name/Date of Birth: Kapil Bingham / 1995 (27 y.o.)      PCP: Brent Hatchet, MD    Date: March 29, 2022     CC:   Chief Complaint   Patient presents with    New Patient     Fistula consult, referral from Dr. Addison Ac Other     Vein mapping 3/25 - MWF dialysis   Chronic kidney disease, stage V, on hemodialysis on M, W, F. Type 1 diabetes mellitus. HPI:   We had the pleasure of seeing Kapil Bingham in the office today, as you know this is a very pleasant 27y.o. year old male with a history of hypertension, DM 1, neuropathy, chronic kidney disease, stage V, coronary artery disease, status post remote MI, status post bacterial endocarditis in the past, treated with antibiotics for 6 weeks, status post CVA with left foot drop, and one episode of a seizure disorder due to extreme hyperglycemia. He came to our cardiovascular surgery clinic for surgical evaluation for AV fistula. He denied any recent episode of midsternal chest pain or indigestion-like symptoms. He is a right-handed person. He started to receive hemodialysis a month ago. He has a right internal jugular vein tunneled catheter. PastMedical History:  Ryanne Ramsey  has a past medical history of Diabetes ([address] Foot drop, left foot, Kidney failure, Neuropathy, and Thyroid disease. Past Surgical History:  The patient  has a past surgical history that includes TUNNELED CENTRAL VENOUS CATHETER W/O SUBCUTANEOUS PORT (Right, 12/23/2021) and IR TUNNELED CVC PLACE WO SQ PORT/PUMP > 5 YEARS (12/23/2021). Allergies: The patient has No Known Allergies.     Medications:    Current Outpatient Medications:     levothyroxine (SYNTHROID) 25 MCG tablet, Take 25 mcg by mouth Daily, Disp: , Rfl:     Blood Glucose Monitoring Suppl (TRUE METRIX METER) w/Device KIT, 1 kit by Does not apply route 4 times daily (before meals and nightly), Disp: 1 kit, Rfl: 0    blood glucose test strips (TRUE METRIX BLOOD GLUCOSE TEST) strip, 1 each by In Vitro route 4 times daily (with meals and nightly) As needed. , Disp: 200 each, Rfl: 3    Lancets MISC, 1 each by Does not apply route 4 times daily, Disp: 200 each, Rfl: 0    sodium bicarbonate 650 MG tablet, Take 2 tablets by mouth 3 times daily, Disp: 90 tablet, Rfl: 1    amLODIPine (NORVASC) 10 MG tablet, Take 1 tablet by mouth daily, Disp: 30 tablet, Rfl: 3    metOLazone (ZAROXOLYN) 5 MG tablet, Take 1 tablet by mouth 2 times daily, Disp: 60 tablet, Rfl: 1    torsemide (DEMADEX) 100 MG tablet, Take 1 tablet by mouth daily, Disp: 30 tablet, Rfl: 3    insulin glargine (LANTUS) 100 UNIT/ML injection vial, Inject 20 Units into the skin 2 times daily Every morning and every evening, Disp: , Rfl:     pregabalin (LYRICA) 100 MG capsule, Take 200 mg by mouth 2 times daily. , Disp: , Rfl:     QUEtiapine (SEROQUEL) 25 MG tablet, Take 25 mg by mouth nightly, Disp: , Rfl:     FLUoxetine (PROZAC) 20 MG capsule, Take 20 mg by mouth nightly, Disp: , Rfl:     FERROUS SULFATE PO, Take 324 mg by mouth every other day, Disp: , Rfl:     Family History: This patient's family history is not on file. Social History:  Gustavo Rose  reports that he has been smoking cigarettes. He has been smoking about 0.50 packs per day. He has never used smokeless tobacco. He reports current drug use. Drug: Marijuana Tayo Brim). He reports that he does not drink alcohol. Vital Signs:   /80 (Site: Right Upper Arm, Position: Sitting, Cuff Size: Medium Adult)   Pulse 79   Ht 5' 3\" (1.6 m)   Wt 120 lb (54.4 kg)   BMI 21.26 kg/m²     ROS:   Constitutional: Negative for activity change, chills, fatigue, fever and unexpected weight change. Respiratory: Negative for sleep apnea, shortness of breath, wheezing, stridor, supplementary home oxygen. Cardiac: Remote myocardial infarction. Vascular: Negative for claudication, leg  calf muscle pain, hip pain.   Gastrointestinal: Negative for hematochezia, melana, constipation, and N/V/D. Musculoskeletal: Left foot drop. Skin: Negative for color change, rash and wound. Neurological: Remote CVA. Residual symptom of left foot drop. Nephrology: Chronic kidney disease, stage V. Physical Exam:  General appearance:  No acute distress, appears stated age and cooperative. Neck: No jugular venous distention. Trachea midline. No carotid bruits. Chest Wall: No deformity, midsternal scar, enlarged palpable supraclavicular lymphnode. Respiratory:  Normal respiratory effort. Clear to auscultation, bilaterally without Rales/Wheezes/Rhonch. Cardiovascular:  Regular rate and rhythm with normal S1/S2 without murmurs, rubs or gallops. Abdomen: Soft, non-tender, non-distended with normal bowel sounds. Ext: Brace in left leg. Skin: Skin color, texture, turgor normal.  No rashes or lesions. No rubor. No venous stasis pigmentation. Neurologic:  Neurovascularly intact without any focal sensory/motor deficits. Peripheral Pulses: +2 palpable femoral pulses bilaterally,    Labs:    CBC:  Lab Results   Component Value Date    WBC 9.6 02/23/2022    HGB 8.3 02/23/2022    HCT 25.7 02/23/2022    MCV 84.5 02/23/2022     02/23/2022    PROTIME 9.9 12/23/2021    INR 0.88 12/23/2021     BMP:   Lab Results   Component Value Date     01/03/2022    K 4.4 01/03/2022    K 4.3 12/20/2021    CL 94 01/03/2022    CO2 25 01/03/2022    PHOS 5.9 12/16/2021     01/03/2022    CREATININE 5.8 01/03/2022    MG 2.30 12/23/2021       Imaging  I have reviewed all imaging including venous mapping.       Problem List:  Patient Active Problem List   Diagnosis    DKA, type 1, not at goal [facility])    Acute infective endocarditis    Acute kidney injury superimposed on chronic kidney disease ([facility] [address]    Metabolic acidosis    Peripheral edema    CKD (chronic kidney disease)    DKA (diabetic ketoacidosis) ([facility] [facility]    Type 1 diabetes mellitus ([facility] [address]    MSSA bacteremia    Poorly controlled type 2 diabetes mellitus ([facility] [address]    History of intravenous drug use in remission    Weight loss counseling, encounter for    Receiving intravenous antibiotic treatment as outpatient       Assessment: Chronic kidney disease, stage V, status post right internal jugular vein tunneled catheter. Plan 3/29/22:  1) left upper arm AV fistula. The risks, benefits, and alternatives were discussed with the patient at length. He wishes to proceed.         Electronically by Ulises Garcia MD  on 3/29/2022 at 1:18 PM

## 2022-03-29 NOTE — TELEPHONE ENCOUNTER
PRIOR AUTHORIZATION:    Submitted request form and clinical documentation (OV note, vein mapping) to [place] via fax [phone]  CPT: 38071  ICD: N18.6, Z99.2  DOS: 4/14/22  Form scanned into media.

## 2022-03-29 NOTE — TELEPHONE ENCOUNTER
Surya Garcia is scheduled for a LEFT UPPER ARM AV FISTULA CREATION with DR Marcelino Conrath on 4/14 at 0AM. He agreed to date/time. Surgery instructions are as follows:  - Arrive to [facility] [address] [facility] at 530am  - NPO after midnight the night before surgery  - Take 1/2 dose of INSULIN the night before surgery  - Must have a  the day of surgery  - PAT visit scheduled for 4/5 at 1230pm    All instructions given to patient at [address] today. He denied questions or concerns at this time. He voiced understanding. Primary insurance is [employer]. Will obtain prior authorization.

## 2022-03-30 ENCOUNTER — PREP FOR PROCEDURE (OUTPATIENT)
Dept: CARDIOTHORACIC SURGERY | Age: 27
End: 2022-03-30

## 2022-03-30 DIAGNOSIS — N18.6 CKD (CHRONIC KIDNEY DISEASE) REQUIRING CHRONIC DIALYSIS (HCC): Primary | ICD-10-CM

## 2022-03-30 DIAGNOSIS — Z99.2 CKD (CHRONIC KIDNEY DISEASE) REQUIRING CHRONIC DIALYSIS (HCC): Primary | ICD-10-CM

## 2022-03-30 RX ORDER — SODIUM CHLORIDE 0.9 % (FLUSH) 0.9 %
10 SYRINGE (ML) INJECTION EVERY 12 HOURS SCHEDULED
Status: CANCELLED | OUTPATIENT
Start: 2022-03-30

## 2022-03-30 RX ORDER — SODIUM CHLORIDE 0.9 % (FLUSH) 0.9 %
10 SYRINGE (ML) INJECTION PRN
Status: CANCELLED | OUTPATIENT
Start: 2022-03-30

## 2022-03-30 RX ORDER — SODIUM CHLORIDE 9 MG/ML
25 INJECTION, SOLUTION INTRAVENOUS PRN
Status: CANCELLED | OUTPATIENT
Start: 2022-03-30

## 2022-03-31 NOTE — PROGRESS NOTES
PAT appointment reminder call  Arrival time and location given 4/5 at 12:15 in PAT  Bring drivers license and insurance  Bring list of medications with dosage and frequency  If possible bring caregiver for appointment  Appointment may last 2 hours

## 2022-04-05 ENCOUNTER — HOSPITAL ENCOUNTER (OUTPATIENT)
Dept: PREADMISSION TESTING | Age: 27
Discharge: HOME | End: 2022-04-05

## 2022-04-05 NOTE — PROGRESS NOTES
Called Dr Trevon Macario old chest xray shows pneumonia.  Dr Trevon Macario wanted chest xray for patient  Order recieved

## 2022-04-05 NOTE — PROGRESS NOTES
Called pt to see if coming he said didn't have a ride to get here. Change his date to 4/11/22 arrive at 1215. Did inform him not to take and ibuprofen, motrin, Asprin,and NSAID.

## 2022-04-07 RX ORDER — CARVEDILOL 6.25 MG/1
TABLET ORAL
Qty: 60 TABLET | OUTPATIENT
Start: 2022-04-07

## 2022-04-07 NOTE — TELEPHONE ENCOUNTER
Due to conflict with another surgery, Roland's case is being rescheduled. Informed him that we are moving it to 4/28/22 at 730AM.  All instructions to remain the same. He voiced understanding.   Moved PAT visit to 4/21/22 at 1230PM.

## 2022-04-14 ENCOUNTER — HOSPITAL ENCOUNTER (OUTPATIENT)
Dept: GENERAL RADIOLOGY | Age: 27
Discharge: HOME | End: 2022-04-14
Payer: MEDICARE

## 2022-04-14 ENCOUNTER — HOSPITAL ENCOUNTER (OUTPATIENT)
Dept: PREADMISSION TESTING | Age: 27
Discharge: HOME | End: 2022-04-18
Payer: MEDICARE

## 2022-04-14 VITALS
BODY MASS INDEX: 24.65 KG/M2 | HEART RATE: 72 BPM | HEIGHT: 64 IN | DIASTOLIC BLOOD PRESSURE: 72 MMHG | OXYGEN SATURATION: 94 % | TEMPERATURE: 98.1 F | SYSTOLIC BLOOD PRESSURE: 128 MMHG | WEIGHT: 144.4 LBS

## 2022-04-14 DIAGNOSIS — N18.6 CKD (CHRONIC KIDNEY DISEASE) REQUIRING CHRONIC DIALYSIS (HCC): ICD-10-CM

## 2022-04-14 DIAGNOSIS — Z01.818 PREOP TESTING: ICD-10-CM

## 2022-04-14 DIAGNOSIS — Z99.2 CKD (CHRONIC KIDNEY DISEASE) REQUIRING CHRONIC DIALYSIS (HCC): ICD-10-CM

## 2022-04-14 LAB
ANION GAP SERPL CALCULATED.3IONS-SCNC: 13 MEQ/L (ref 8–16)
BUN BLDV-MCNC: 28 MG/DL (ref 7–22)
CALCIUM SERPL-MCNC: 9.2 MG/DL (ref 8.5–10.5)
CHLORIDE BLD-SCNC: 101 MEQ/L (ref 98–111)
CO2: 27 MEQ/L (ref 23–33)
CREAT SERPL-MCNC: 4 MG/DL (ref 0.4–1.2)
ERYTHROCYTE [DISTWIDTH] IN BLOOD BY AUTOMATED COUNT: 16 % (ref 11.5–14.5)
ERYTHROCYTE [DISTWIDTH] IN BLOOD BY AUTOMATED COUNT: 52.1 FL (ref 35–45)
GFR SERPL CREATININE-BSD FRML MDRD: 18 ML/MIN/1.73M2
GLUCOSE BLD-MCNC: 108 MG/DL (ref 70–108)
HCT VFR BLD CALC: 33.3 % (ref 42–52)
HEMOGLOBIN: 10.7 GM/DL (ref 14–18)
INR BLD: 1.01 (ref 0.85–1.13)
MCH RBC QN AUTO: 28.9 PG (ref 26–33)
MCHC RBC AUTO-ENTMCNC: 32.1 GM/DL (ref 32.2–35.5)
MCV RBC AUTO: 90 FL (ref 80–94)
PLATELET # BLD: 439 THOU/MM3 (ref 130–400)
PMV BLD AUTO: 10.2 FL (ref 9.4–12.4)
POTASSIUM SERPL-SCNC: 4.3 MEQ/L (ref 3.5–5.2)
RBC # BLD: 3.7 MILL/MM3 (ref 4.7–6.1)
SODIUM BLD-SCNC: 141 MEQ/L (ref 135–145)
WBC # BLD: 5.2 THOU/MM3 (ref 4.8–10.8)

## 2022-04-14 PROCEDURE — 85610 PROTHROMBIN TIME: CPT

## 2022-04-14 PROCEDURE — 85027 COMPLETE CBC AUTOMATED: CPT

## 2022-04-14 PROCEDURE — 36415 COLL VENOUS BLD VENIPUNCTURE: CPT

## 2022-04-14 PROCEDURE — 80048 BASIC METABOLIC PNL TOTAL CA: CPT

## 2022-04-14 PROCEDURE — 71046 X-RAY EXAM CHEST 2 VIEWS: CPT

## 2022-04-14 RX ORDER — ONDANSETRON 4 MG/1
TABLET, FILM COATED ORAL 3 TIMES DAILY PRN
COMMUNITY

## 2022-04-14 RX ORDER — CARVEDILOL 6.25 MG/1
6.25 TABLET ORAL 2 TIMES DAILY WITH MEALS
COMMUNITY

## 2022-04-14 RX ORDER — CITALOPRAM 40 MG/1
40 TABLET ORAL DAILY
COMMUNITY

## 2022-04-14 RX ORDER — SEVELAMER CARBONATE 800 MG/1
2 TABLET, FILM COATED ORAL
COMMUNITY

## 2022-04-14 NOTE — PROGRESS NOTES
In preparation for their surgical procedure above patient was screened for Obstructive Sleep Apnea (CHERELLE) using the STOP-Bang Questionnaire by the Pre-Admission Testing department. This is a pre-surgical screening tool for patient safety and serves as a recommendation, this WILL NOT cause cancellation of surgery. STOP-Bang Questionnaire  * Do you currently see a pulmonologist?  No     If yes STOP, do not complete. Patient follows with Dr.     1.  Do you snore loudly (able to be heard in the next room)? No    2. Do you often feel tired or sleepy during the daytime? No       3. Has anyone ever told you that you stop breathing during your sleep? No    4. Do you have or are you being treated for high blood pressure? Yes      5. BMI more than 35? BMI (Calculated): 24.7        No    6. Age over 48 years? 27 y.o. No    7. Neck Circumference greater than 17 inches for male or 16 inches for female? Measured   14        (visits only)            No    8. Gender Male? Yes      TOTAL SCORE: 2    CHERELLE - Low Risk : Yes to 0 - 2 questions  CHERELLE - Intermediate Risk : Yes to 3 - 4 questions  CHERELLE - High Risk : Yes to 5 - 8 questions    Adapted from:   STOP Questionnaire: A Tool to Screen Patients for Obstructive Sleep Apnea   JEISON Hines.P.C., Leonarda Solano, M.B.B.S., Karon Arana M.D., Ebony Esposito. Camilo Banda, Ph.D., GINA Ward.B.B.S., Zain Gomez, M.Sc., Soraya Denis M.D., Dorlene Manual. Laura JEISON Newton.P.C.    Anesthesiology 2008; 492:483-61 Copyright 2008, the [address] of Anesthesiologists, [facility].   ----------------------------------------------------------------------------------------------------------------

## 2022-04-27 ENCOUNTER — ANESTHESIA EVENT (OUTPATIENT)
Dept: OPERATING ROOM | Age: 27
End: 2022-04-27
Payer: MEDICARE

## 2022-04-28 ENCOUNTER — HOSPITAL ENCOUNTER (OUTPATIENT)
Age: 27
Setting detail: OUTPATIENT SURGERY
Discharge: HOME | End: 2022-04-28
Attending: THORACIC SURGERY (CARDIOTHORACIC VASCULAR SURGERY) | Admitting: THORACIC SURGERY (CARDIOTHORACIC VASCULAR SURGERY)
Payer: MEDICARE

## 2022-04-28 ENCOUNTER — ANESTHESIA (OUTPATIENT)
Dept: OPERATING ROOM | Age: 27
End: 2022-04-28
Payer: MEDICARE

## 2022-04-28 VITALS — SYSTOLIC BLOOD PRESSURE: 125 MMHG | TEMPERATURE: 98.8 F | DIASTOLIC BLOOD PRESSURE: 70 MMHG | OXYGEN SATURATION: 92 %

## 2022-04-28 VITALS
SYSTOLIC BLOOD PRESSURE: 121 MMHG | TEMPERATURE: 97.5 F | HEIGHT: 64 IN | RESPIRATION RATE: 16 BRPM | OXYGEN SATURATION: 92 % | DIASTOLIC BLOOD PRESSURE: 64 MMHG | HEART RATE: 71 BPM | BODY MASS INDEX: 20.49 KG/M2 | WEIGHT: 120 LBS

## 2022-04-28 DIAGNOSIS — Z98.890 S/P ARTERIOVENOUS (AV) FISTULA CREATION: Primary | ICD-10-CM

## 2022-04-28 LAB
ANION GAP SERPL CALCULATED.3IONS-SCNC: 9 MEQ/L (ref 8–16)
BUN BLDV-MCNC: 15 MG/DL (ref 7–22)
CALCIUM SERPL-MCNC: 9.3 MG/DL (ref 8.5–10.5)
CHLORIDE BLD-SCNC: 97 MEQ/L (ref 98–111)
CO2: 31 MEQ/L (ref 23–33)
CREAT SERPL-MCNC: 3.6 MG/DL (ref 0.4–1.2)
GFR SERPL CREATININE-BSD FRML MDRD: 20 ML/MIN/1.73M2
GLUCOSE BLD-MCNC: 206 MG/DL (ref 70–108)
GLUCOSE BLD-MCNC: 254 MG/DL (ref 70–108)
POTASSIUM REFLEX MAGNESIUM: 3.9 MEQ/L (ref 3.5–5.2)
SODIUM BLD-SCNC: 137 MEQ/L (ref 135–145)

## 2022-04-28 PROCEDURE — 7100000010 HC PHASE II RECOVERY - FIRST 15 MIN: Performed by: THORACIC SURGERY (CARDIOTHORACIC VASCULAR SURGERY)

## 2022-04-28 PROCEDURE — APPSS60 APP SPLIT SHARED TIME 46-60 MINUTES: Performed by: PHYSICIAN ASSISTANT

## 2022-04-28 PROCEDURE — 6370000000 HC RX 637 (ALT 250 FOR IP): Performed by: THORACIC SURGERY (CARDIOTHORACIC VASCULAR SURGERY)

## 2022-04-28 PROCEDURE — 6360000002 HC RX W HCPCS

## 2022-04-28 PROCEDURE — 7100000001 HC PACU RECOVERY - ADDTL 15 MIN: Performed by: THORACIC SURGERY (CARDIOTHORACIC VASCULAR SURGERY)

## 2022-04-28 PROCEDURE — 2709999900 HC NON-CHARGEABLE SUPPLY: Performed by: THORACIC SURGERY (CARDIOTHORACIC VASCULAR SURGERY)

## 2022-04-28 PROCEDURE — 3700000001 HC ADD 15 MINUTES (ANESTHESIA): Performed by: THORACIC SURGERY (CARDIOTHORACIC VASCULAR SURGERY)

## 2022-04-28 PROCEDURE — 3600000004 HC SURGERY LEVEL 4 BASE: Performed by: THORACIC SURGERY (CARDIOTHORACIC VASCULAR SURGERY)

## 2022-04-28 PROCEDURE — 7100000000 HC PACU RECOVERY - FIRST 15 MIN: Performed by: THORACIC SURGERY (CARDIOTHORACIC VASCULAR SURGERY)

## 2022-04-28 PROCEDURE — 2580000003 HC RX 258: Performed by: PHYSICIAN ASSISTANT

## 2022-04-28 PROCEDURE — 6370000000 HC RX 637 (ALT 250 FOR IP): Performed by: ANESTHESIOLOGY

## 2022-04-28 PROCEDURE — 82948 REAGENT STRIP/BLOOD GLUCOSE: CPT

## 2022-04-28 PROCEDURE — 80048 BASIC METABOLIC PNL TOTAL CA: CPT

## 2022-04-28 PROCEDURE — 36819 AV FUSE UPPR ARM BASILIC: CPT | Performed by: PHYSICIAN ASSISTANT

## 2022-04-28 PROCEDURE — 36819 AV FUSE UPPR ARM BASILIC: CPT | Performed by: THORACIC SURGERY (CARDIOTHORACIC VASCULAR SURGERY)

## 2022-04-28 PROCEDURE — 6360000002 HC RX W HCPCS: Performed by: THORACIC SURGERY (CARDIOTHORACIC VASCULAR SURGERY)

## 2022-04-28 PROCEDURE — APPSS15 APP SPLIT SHARED TIME 0-15 MINUTES: Performed by: PHYSICIAN ASSISTANT

## 2022-04-28 PROCEDURE — A4217 STERILE WATER/SALINE, 500 ML: HCPCS | Performed by: THORACIC SURGERY (CARDIOTHORACIC VASCULAR SURGERY)

## 2022-04-28 PROCEDURE — 7100000011 HC PHASE II RECOVERY - ADDTL 15 MIN: Performed by: THORACIC SURGERY (CARDIOTHORACIC VASCULAR SURGERY)

## 2022-04-28 PROCEDURE — 3700000000 HC ANESTHESIA ATTENDED CARE: Performed by: THORACIC SURGERY (CARDIOTHORACIC VASCULAR SURGERY)

## 2022-04-28 PROCEDURE — 36415 COLL VENOUS BLD VENIPUNCTURE: CPT

## 2022-04-28 PROCEDURE — 3600000014 HC SURGERY LEVEL 4 ADDTL 15MIN: Performed by: THORACIC SURGERY (CARDIOTHORACIC VASCULAR SURGERY)

## 2022-04-28 PROCEDURE — 2500000003 HC RX 250 WO HCPCS

## 2022-04-28 PROCEDURE — 6360000002 HC RX W HCPCS: Performed by: PHYSICIAN ASSISTANT

## 2022-04-28 PROCEDURE — 2580000003 HC RX 258: Performed by: THORACIC SURGERY (CARDIOTHORACIC VASCULAR SURGERY)

## 2022-04-28 RX ORDER — FAMOTIDINE 10 MG/ML
INJECTION, SOLUTION INTRAVENOUS PRN
Status: DISCONTINUED | OUTPATIENT
Start: 2022-04-28 | End: 2022-04-28 | Stop reason: SDUPTHER

## 2022-04-28 RX ORDER — TRAMADOL HYDROCHLORIDE 50 MG/1
50 TABLET ORAL EVERY 8 HOURS PRN
Qty: 15 TABLET | Refills: 0 | Status: SHIPPED | OUTPATIENT
Start: 2022-04-28 | End: 2022-05-03

## 2022-04-28 RX ORDER — ONDANSETRON 2 MG/ML
INJECTION INTRAMUSCULAR; INTRAVENOUS PRN
Status: DISCONTINUED | OUTPATIENT
Start: 2022-04-28 | End: 2022-04-28 | Stop reason: SDUPTHER

## 2022-04-28 RX ORDER — TRAMADOL HYDROCHLORIDE 50 MG/1
50 TABLET ORAL ONCE
Status: COMPLETED | OUTPATIENT
Start: 2022-04-28 | End: 2022-04-28

## 2022-04-28 RX ORDER — SODIUM CHLORIDE 0.9 % (FLUSH) 0.9 %
10 SYRINGE (ML) INJECTION PRN
Status: DISCONTINUED | OUTPATIENT
Start: 2022-04-28 | End: 2022-04-28 | Stop reason: HOSPADM

## 2022-04-28 RX ORDER — KETAMINE HCL IN NACL, ISO-OSM 100MG/10ML
SYRINGE (ML) INJECTION PRN
Status: DISCONTINUED | OUTPATIENT
Start: 2022-04-28 | End: 2022-04-28 | Stop reason: SDUPTHER

## 2022-04-28 RX ORDER — SODIUM CHLORIDE 9 MG/ML
INJECTION, SOLUTION INTRAVENOUS PRN
Status: DISCONTINUED | OUTPATIENT
Start: 2022-04-28 | End: 2022-04-28 | Stop reason: HOSPADM

## 2022-04-28 RX ORDER — PROPOFOL 10 MG/ML
INJECTION, EMULSION INTRAVENOUS PRN
Status: DISCONTINUED | OUTPATIENT
Start: 2022-04-28 | End: 2022-04-28 | Stop reason: SDUPTHER

## 2022-04-28 RX ORDER — LIDOCAINE HCL/PF 100 MG/5ML
SYRINGE (ML) INJECTION PRN
Status: DISCONTINUED | OUTPATIENT
Start: 2022-04-28 | End: 2022-04-28 | Stop reason: SDUPTHER

## 2022-04-28 RX ORDER — SODIUM CHLORIDE 0.9 % (FLUSH) 0.9 %
10 SYRINGE (ML) INJECTION EVERY 12 HOURS SCHEDULED
Status: DISCONTINUED | OUTPATIENT
Start: 2022-04-28 | End: 2022-04-28 | Stop reason: HOSPADM

## 2022-04-28 RX ORDER — METOCLOPRAMIDE HYDROCHLORIDE 5 MG/ML
INJECTION INTRAMUSCULAR; INTRAVENOUS PRN
Status: DISCONTINUED | OUTPATIENT
Start: 2022-04-28 | End: 2022-04-28 | Stop reason: SDUPTHER

## 2022-04-28 RX ORDER — LABETALOL 20 MG/4 ML (5 MG/ML) INTRAVENOUS SYRINGE
PRN
Status: DISCONTINUED | OUTPATIENT
Start: 2022-04-28 | End: 2022-04-28 | Stop reason: SDUPTHER

## 2022-04-28 RX ORDER — SODIUM CHLORIDE 0.9 % (FLUSH) 0.9 %
5-40 SYRINGE (ML) INJECTION EVERY 12 HOURS SCHEDULED
Status: DISCONTINUED | OUTPATIENT
Start: 2022-04-28 | End: 2022-04-28 | Stop reason: HOSPADM

## 2022-04-28 RX ORDER — SODIUM CHLORIDE 0.9 % (FLUSH) 0.9 %
5-40 SYRINGE (ML) INJECTION PRN
Status: DISCONTINUED | OUTPATIENT
Start: 2022-04-28 | End: 2022-04-28 | Stop reason: HOSPADM

## 2022-04-28 RX ORDER — DIPHENHYDRAMINE HYDROCHLORIDE 50 MG/ML
12.5 INJECTION INTRAMUSCULAR; INTRAVENOUS
Status: DISCONTINUED | OUTPATIENT
Start: 2022-04-28 | End: 2022-04-28 | Stop reason: HOSPADM

## 2022-04-28 RX ORDER — PROTAMINE SULFATE 10 MG/ML
INJECTION, SOLUTION INTRAVENOUS PRN
Status: DISCONTINUED | OUTPATIENT
Start: 2022-04-28 | End: 2022-04-28 | Stop reason: SDUPTHER

## 2022-04-28 RX ORDER — HEPARIN SODIUM 1000 [USP'U]/ML
INJECTION, SOLUTION INTRAVENOUS; SUBCUTANEOUS PRN
Status: DISCONTINUED | OUTPATIENT
Start: 2022-04-28 | End: 2022-04-28 | Stop reason: SDUPTHER

## 2022-04-28 RX ORDER — SODIUM CHLORIDE 9 MG/ML
25 INJECTION, SOLUTION INTRAVENOUS PRN
Status: DISCONTINUED | OUTPATIENT
Start: 2022-04-28 | End: 2022-04-28 | Stop reason: HOSPADM

## 2022-04-28 RX ORDER — ROCURONIUM BROMIDE 10 MG/ML
INJECTION, SOLUTION INTRAVENOUS PRN
Status: DISCONTINUED | OUTPATIENT
Start: 2022-04-28 | End: 2022-04-28 | Stop reason: SDUPTHER

## 2022-04-28 RX ORDER — FENTANYL CITRATE 50 UG/ML
INJECTION, SOLUTION INTRAMUSCULAR; INTRAVENOUS PRN
Status: DISCONTINUED | OUTPATIENT
Start: 2022-04-28 | End: 2022-04-28 | Stop reason: SDUPTHER

## 2022-04-28 RX ORDER — MIDAZOLAM HYDROCHLORIDE 1 MG/ML
INJECTION INTRAMUSCULAR; INTRAVENOUS PRN
Status: DISCONTINUED | OUTPATIENT
Start: 2022-04-28 | End: 2022-04-28 | Stop reason: SDUPTHER

## 2022-04-28 RX ORDER — FENTANYL CITRATE 50 UG/ML
50 INJECTION, SOLUTION INTRAMUSCULAR; INTRAVENOUS EVERY 5 MIN PRN
Status: DISCONTINUED | OUTPATIENT
Start: 2022-04-28 | End: 2022-04-28 | Stop reason: HOSPADM

## 2022-04-28 RX ORDER — FENTANYL CITRATE 50 UG/ML
25 INJECTION, SOLUTION INTRAMUSCULAR; INTRAVENOUS EVERY 5 MIN PRN
Status: DISCONTINUED | OUTPATIENT
Start: 2022-04-28 | End: 2022-04-28 | Stop reason: HOSPADM

## 2022-04-28 RX ORDER — GLYCOPYRROLATE 1 MG/5 ML
SYRINGE (ML) INTRAVENOUS PRN
Status: DISCONTINUED | OUTPATIENT
Start: 2022-04-28 | End: 2022-04-28 | Stop reason: SDUPTHER

## 2022-04-28 RX ORDER — PHENYLEPHRINE HYDROCHLORIDE 10 MG/ML
INJECTION INTRAVENOUS PRN
Status: DISCONTINUED | OUTPATIENT
Start: 2022-04-28 | End: 2022-04-28 | Stop reason: SDUPTHER

## 2022-04-28 RX ORDER — NEOSTIGMINE METHYLSULFATE 5 MG/5 ML
SYRINGE (ML) INTRAVENOUS PRN
Status: DISCONTINUED | OUTPATIENT
Start: 2022-04-28 | End: 2022-04-28 | Stop reason: SDUPTHER

## 2022-04-28 RX ORDER — METOCLOPRAMIDE HYDROCHLORIDE 5 MG/ML
10 INJECTION INTRAMUSCULAR; INTRAVENOUS
Status: DISCONTINUED | OUTPATIENT
Start: 2022-04-28 | End: 2022-04-28 | Stop reason: HOSPADM

## 2022-04-28 RX ADMIN — INSULIN HUMAN 4 UNITS: 100 INJECTION, SOLUTION PARENTERAL at 13:57

## 2022-04-28 RX ADMIN — PROPOFOL 200 MG: 10 INJECTION, EMULSION INTRAVENOUS at 07:39

## 2022-04-28 RX ADMIN — FENTANYL CITRATE 25 MCG: 50 INJECTION, SOLUTION INTRAMUSCULAR; INTRAVENOUS at 13:24

## 2022-04-28 RX ADMIN — FAMOTIDINE 20 MG: 10 INJECTION, SOLUTION INTRAVENOUS at 07:17

## 2022-04-28 RX ADMIN — HEPARIN SODIUM 5000 UNITS: 1000 INJECTION, SOLUTION INTRAVENOUS; SUBCUTANEOUS at 11:48

## 2022-04-28 RX ADMIN — PROPOFOL 50 MG: 10 INJECTION, EMULSION INTRAVENOUS at 07:45

## 2022-04-28 RX ADMIN — HEPARIN SODIUM 3000 UNITS: 1000 INJECTION, SOLUTION INTRAVENOUS; SUBCUTANEOUS at 09:18

## 2022-04-28 RX ADMIN — FENTANYL CITRATE 25 MCG: 50 INJECTION, SOLUTION INTRAMUSCULAR; INTRAVENOUS at 11:32

## 2022-04-28 RX ADMIN — ONDANSETRON 4 MG: 2 INJECTION INTRAMUSCULAR; INTRAVENOUS at 10:02

## 2022-04-28 RX ADMIN — Medication 30 MG: at 07:39

## 2022-04-28 RX ADMIN — PHENYLEPHRINE HYDROCHLORIDE 50 MCG: 10 INJECTION INTRAVENOUS at 09:42

## 2022-04-28 RX ADMIN — FENTANYL CITRATE 50 MCG: 50 INJECTION, SOLUTION INTRAMUSCULAR; INTRAVENOUS at 07:39

## 2022-04-28 RX ADMIN — PHENYLEPHRINE HYDROCHLORIDE 50 MCG: 10 INJECTION INTRAVENOUS at 11:39

## 2022-04-28 RX ADMIN — PROTAMINE SULFATE 25 MG: 10 INJECTION, SOLUTION INTRAVENOUS at 09:59

## 2022-04-28 RX ADMIN — Medication 0.6 MG: at 10:18

## 2022-04-28 RX ADMIN — MIDAZOLAM 1 MG: 1 INJECTION INTRAMUSCULAR; INTRAVENOUS at 07:34

## 2022-04-28 RX ADMIN — Medication 80 MG: at 07:39

## 2022-04-28 RX ADMIN — HEPARIN SODIUM 1000 UNITS: 1000 INJECTION, SOLUTION INTRAVENOUS; SUBCUTANEOUS at 10:51

## 2022-04-28 RX ADMIN — PROPOFOL 50 MG: 10 INJECTION, EMULSION INTRAVENOUS at 11:05

## 2022-04-28 RX ADMIN — Medication 2000 MG: at 07:48

## 2022-04-28 RX ADMIN — HEPARIN SODIUM 1000 UNITS: 1000 INJECTION, SOLUTION INTRAVENOUS; SUBCUTANEOUS at 08:27

## 2022-04-28 RX ADMIN — FENTANYL CITRATE 25 MCG: 50 INJECTION, SOLUTION INTRAMUSCULAR; INTRAVENOUS at 13:02

## 2022-04-28 RX ADMIN — TRAMADOL HYDROCHLORIDE 50 MG: 50 TABLET, COATED ORAL at 15:14

## 2022-04-28 RX ADMIN — METOCLOPRAMIDE 10 MG: 5 INJECTION, SOLUTION INTRAMUSCULAR; INTRAVENOUS at 07:17

## 2022-04-28 RX ADMIN — SODIUM CHLORIDE 25 ML: 9 INJECTION, SOLUTION INTRAVENOUS at 06:19

## 2022-04-28 RX ADMIN — LABETALOL 20 MG/4 ML (5 MG/ML) INTRAVENOUS SYRINGE 5 MG: at 13:03

## 2022-04-28 RX ADMIN — Medication 3 MG: at 10:18

## 2022-04-28 RX ADMIN — ROCURONIUM BROMIDE 20 MG: 50 INJECTION, SOLUTION INTRAVENOUS at 07:39

## 2022-04-28 RX ADMIN — FENTANYL CITRATE 25 MCG: 50 INJECTION, SOLUTION INTRAMUSCULAR; INTRAVENOUS at 10:32

## 2022-04-28 RX ADMIN — FENTANYL CITRATE 25 MCG: 50 INJECTION, SOLUTION INTRAMUSCULAR; INTRAVENOUS at 08:24

## 2022-04-28 ASSESSMENT — PULMONARY FUNCTION TESTS
PIF_VALUE: 20
PIF_VALUE: 20
PIF_VALUE: 28
PIF_VALUE: 22
PIF_VALUE: 22
PIF_VALUE: 21
PIF_VALUE: 20
PIF_VALUE: 4
PIF_VALUE: 21
PIF_VALUE: 20
PIF_VALUE: 17
PIF_VALUE: 20
PIF_VALUE: 21
PIF_VALUE: 20
PIF_VALUE: 20
PIF_VALUE: 22
PIF_VALUE: 4
PIF_VALUE: 20
PIF_VALUE: 23
PIF_VALUE: 19
PIF_VALUE: 20
PIF_VALUE: 21
PIF_VALUE: 23
PIF_VALUE: 20
PIF_VALUE: 21
PIF_VALUE: 20
PIF_VALUE: 21
PIF_VALUE: 1
PIF_VALUE: 20
PIF_VALUE: 20
PIF_VALUE: 0
PIF_VALUE: 20
PIF_VALUE: 21
PIF_VALUE: 20
PIF_VALUE: 20
PIF_VALUE: 21
PIF_VALUE: 20
PIF_VALUE: 20
PIF_VALUE: 21
PIF_VALUE: 18
PIF_VALUE: 2
PIF_VALUE: 17
PIF_VALUE: 21
PIF_VALUE: 20
PIF_VALUE: 21
PIF_VALUE: 20
PIF_VALUE: 22
PIF_VALUE: 21
PIF_VALUE: 20
PIF_VALUE: 21
PIF_VALUE: 1
PIF_VALUE: 20
PIF_VALUE: 19
PIF_VALUE: 20
PIF_VALUE: 21
PIF_VALUE: 20
PIF_VALUE: 20
PIF_VALUE: 11
PIF_VALUE: 20
PIF_VALUE: 20
PIF_VALUE: 3
PIF_VALUE: 20
PIF_VALUE: 1
PIF_VALUE: 6
PIF_VALUE: 20
PIF_VALUE: 20
PIF_VALUE: 22
PIF_VALUE: 4
PIF_VALUE: 20
PIF_VALUE: 21
PIF_VALUE: 20
PIF_VALUE: 20
PIF_VALUE: 21
PIF_VALUE: 20
PIF_VALUE: 19
PIF_VALUE: 20
PIF_VALUE: 20
PIF_VALUE: 21
PIF_VALUE: 20
PIF_VALUE: 22
PIF_VALUE: 20
PIF_VALUE: 15
PIF_VALUE: 20
PIF_VALUE: 4
PIF_VALUE: 20
PIF_VALUE: 20
PIF_VALUE: 21
PIF_VALUE: 20
PIF_VALUE: 21
PIF_VALUE: 14
PIF_VALUE: 21
PIF_VALUE: 20
PIF_VALUE: 0
PIF_VALUE: 20
PIF_VALUE: 0
PIF_VALUE: 21
PIF_VALUE: 20
PIF_VALUE: 20
PIF_VALUE: 19
PIF_VALUE: 19
PIF_VALUE: 20
PIF_VALUE: 7
PIF_VALUE: 19
PIF_VALUE: 21
PIF_VALUE: 20
PIF_VALUE: 21
PIF_VALUE: 20
PIF_VALUE: 21
PIF_VALUE: 22
PIF_VALUE: 20
PIF_VALUE: 0
PIF_VALUE: 22
PIF_VALUE: 22
PIF_VALUE: 19
PIF_VALUE: 0
PIF_VALUE: 21
PIF_VALUE: 20
PIF_VALUE: 22
PIF_VALUE: 20
PIF_VALUE: 22
PIF_VALUE: 1
PIF_VALUE: 19
PIF_VALUE: 6
PIF_VALUE: 20
PIF_VALUE: 3
PIF_VALUE: 20
PIF_VALUE: 20
PIF_VALUE: 21
PIF_VALUE: 23
PIF_VALUE: 21
PIF_VALUE: 20
PIF_VALUE: 21
PIF_VALUE: 20
PIF_VALUE: 21
PIF_VALUE: 20
PIF_VALUE: 1
PIF_VALUE: 22
PIF_VALUE: 0
PIF_VALUE: 20
PIF_VALUE: 21
PIF_VALUE: 20
PIF_VALUE: 21
PIF_VALUE: 22
PIF_VALUE: 20
PIF_VALUE: 22
PIF_VALUE: 20
PIF_VALUE: 7
PIF_VALUE: 20
PIF_VALUE: 20
PIF_VALUE: 21
PIF_VALUE: 21
PIF_VALUE: 20
PIF_VALUE: 17
PIF_VALUE: 20
PIF_VALUE: 14
PIF_VALUE: 7
PIF_VALUE: 21
PIF_VALUE: 20
PIF_VALUE: 22
PIF_VALUE: 20
PIF_VALUE: 6
PIF_VALUE: 20
PIF_VALUE: 1
PIF_VALUE: 20
PIF_VALUE: 20
PIF_VALUE: 19
PIF_VALUE: 20
PIF_VALUE: 20
PIF_VALUE: 1
PIF_VALUE: 20
PIF_VALUE: 20
PIF_VALUE: 21
PIF_VALUE: 20
PIF_VALUE: 5
PIF_VALUE: 20
PIF_VALUE: 21
PIF_VALUE: 20
PIF_VALUE: 25
PIF_VALUE: 19
PIF_VALUE: 20
PIF_VALUE: 20
PIF_VALUE: 22
PIF_VALUE: 20
PIF_VALUE: 21
PIF_VALUE: 20
PIF_VALUE: 20
PIF_VALUE: 16
PIF_VALUE: 20
PIF_VALUE: 19
PIF_VALUE: 17
PIF_VALUE: 20
PIF_VALUE: 22
PIF_VALUE: 20
PIF_VALUE: 20
PIF_VALUE: 19
PIF_VALUE: 20
PIF_VALUE: 21
PIF_VALUE: 20
PIF_VALUE: 22
PIF_VALUE: 4
PIF_VALUE: 20
PIF_VALUE: 20
PIF_VALUE: 4
PIF_VALUE: 20
PIF_VALUE: 7
PIF_VALUE: 20
PIF_VALUE: 20
PIF_VALUE: 21
PIF_VALUE: 21
PIF_VALUE: 20
PIF_VALUE: 21
PIF_VALUE: 20
PIF_VALUE: 17
PIF_VALUE: 20
PIF_VALUE: 22
PIF_VALUE: 20
PIF_VALUE: 20
PIF_VALUE: 21
PIF_VALUE: 20
PIF_VALUE: 20
PIF_VALUE: 22
PIF_VALUE: 20
PIF_VALUE: 21
PIF_VALUE: 20
PIF_VALUE: 21
PIF_VALUE: 20
PIF_VALUE: 21
PIF_VALUE: 20
PIF_VALUE: 21
PIF_VALUE: 20
PIF_VALUE: 20
PIF_VALUE: 21
PIF_VALUE: 20
PIF_VALUE: 16
PIF_VALUE: 20
PIF_VALUE: 19
PIF_VALUE: 16
PIF_VALUE: 20
PIF_VALUE: 20
PIF_VALUE: 0
PIF_VALUE: 21
PIF_VALUE: 20
PIF_VALUE: 21
PIF_VALUE: 20
PIF_VALUE: 22
PIF_VALUE: 20
PIF_VALUE: 21
PIF_VALUE: 20
PIF_VALUE: 20
PIF_VALUE: 6
PIF_VALUE: 21
PIF_VALUE: 21
PIF_VALUE: 22
PIF_VALUE: 20
PIF_VALUE: 17
PIF_VALUE: 20
PIF_VALUE: 22
PIF_VALUE: 21
PIF_VALUE: 21
PIF_VALUE: 1
PIF_VALUE: 20
PIF_VALUE: 6
PIF_VALUE: 20
PIF_VALUE: 22
PIF_VALUE: 20
PIF_VALUE: 20
PIF_VALUE: 1
PIF_VALUE: 18
PIF_VALUE: 22
PIF_VALUE: 20
PIF_VALUE: 21

## 2022-04-28 ASSESSMENT — PAIN SCALES - GENERAL
PAINLEVEL_OUTOF10: 0
PAINLEVEL_OUTOF10: 6
PAINLEVEL_OUTOF10: 0
PAINLEVEL_OUTOF10: 6
PAINLEVEL_OUTOF10: 6
PAINLEVEL_OUTOF10: 0

## 2022-04-28 NOTE — PROGRESS NOTES
Patients cousin, Shereen Vashti contacted, updated on patient condition, he states he will be here to  patient in about 45 minutes and will call unit when he's here.

## 2022-04-28 NOTE — DISCHARGE SUMMARY
CT/CV Surgery Discharge Summary     Pt Name: Heike Christie  MRN: [id number]  YOB: 1995  Primary Care Physician: Gabriel Carrillo MD    Admit date:  4/28/2022  5:25 AM     Discharge date:  04/28/22     Disposition: Home    Admitting Diagnosis: ESRD    Discharge Diagnosis: ESRD    Condition: Stable    Problem List:   Patient Active Problem List   Diagnosis Code    DKA, type 1, not at goal [facility]) E10.10    Acute infective endocarditis I33.0    Acute kidney injury superimposed on chronic kidney disease ([facility] [address] N17.9, R62.3    Metabolic acidosis U39.6    Peripheral edema R60.9    CKD (chronic kidney disease) N18.9    DKA (diabetic ketoacidosis) ([facility]) E11.10    Type [facility] diabetes mellitus [facility] E10.9    MSSA bacteremia R78.81, B95.61    Poorly controlled type 2 diabetes mellitus ([facility] E11.65    History of intravenous drug use in remission Z87.898    Weight loss counseling, encounter for Z71.3    Receiving intravenous antibiotic treatment as outpatient Z79.2    Sensory disturbance R20.9    Major depressive disorder, single episode, severe with psychotic features ([facility] [address] F32.3    Peripheral neuropathy G62.9    Persistent depressive disorder F34.1    ADHD F90.9    Proteinuria R80.9    Oppositional disorder F91.3    Adjustment disorder with mixed emotional features F43.29    Plantar fasciitis M72.2    Type 1 diabetes mellitus with nephropathy ([facility]) E10.21    Hyperlipidemia E78.5    Homeless Z59.00    Anxiety disorder F41.9       Procedures:  Left AV Fistula creation using basilic vein transposition to brachial artery after attempted left cephalic vein to brachial artery fistula creation     Reason for Admission:  \"Roland Mccloud is a very pleasant 27 y. o. year old male with a history of hypertension, DM 1, neuropathy, chronic kidney disease, stage V, coronary artery disease, status post remote MI, status post bacterial endocarditis in the past, treated with antibiotics for 6 weeks, status post CVA with left foot drop, and one episode of a seizure disorder due to extreme hyperglycemia.      He denied any recent episode of midsternal chest pain or indigestion-like symptoms.  He is a right-handed person. Kelsey Williamson started to receive hemodialysis 2 months ago.  He has a right internal jugular vein tunneled catheter,\" per Antonio Hensley's H&P. Hospital Course: Following an uncomplicated  Lt Brachial-Basilic AVF creation, the patient had an unremarkable and progressive convalescence without adverse events. At time of discharge, Monroe Bañuelos was alert, tolerating a regular diet, ambulating on his own accord and had adequate analgesia on oral pain medications, and had no signs of any complications. DISCHARGE INSTRUCTIONS:    Discharge Vitals:  height is 5' 4\" (1.626 m) and weight is 120 lb (54.4 kg). His temporal temperature is 97.8 °F (36.6 °C). His blood pressure is 165/94 (abnormal) and his pulse is 77. His respiration is 16 and oxygen saturation is 98%. Discharge Medications:         Medication List      START taking these medications    traMADol 50 MG tablet  Commonly known as: Ultram  Take 1 tablet by mouth every 8 hours as needed for Pain for up to 5 days. Intended supply: 5 days.  Take lowest dose possible to manage pain        CONTINUE taking these medications    amLODIPine 10 MG tablet  Commonly known as: NORVASC  Take 1 tablet by mouth daily     carvedilol 6.25 MG tablet  Commonly known as: COREG     citalopram 40 MG tablet  Commonly known as: CELEXA     FERROUS SULFATE PO     FLUoxetine 20 MG capsule  Commonly known as: PROZAC     insulin glargine 100 UNIT/ML injection vial  Commonly known as: LANTUS     levothyroxine 25 MCG tablet  Commonly known as: SYNTHROID     metOLazone 5 MG tablet  Commonly known as: ZAROXOLYN  Take 1 tablet by mouth 2 times daily     ondansetron 4 MG tablet  Commonly known as: ZOFRAN     pregabalin 100 MG capsule  Commonly known as: LYRICA     SEROquel 25 MG tablet  Generic drug: QUEtiapine     sevelamer 800 MG tablet  Commonly known as: RENVELA     sodium bicarbonate 650 MG tablet  Take 2 tablets by mouth 3 times daily     torsemide 100 MG tablet  Commonly known as: DEMADEX  Take 1 tablet by mouth daily           Where to Get Your Medications      You can get these medications from any pharmacy    Bring a paper prescription for each of these medications  · traMADol 50 MG tablet         EMERGENCIES   ? You should either call 911 or go to the Emergency Room to be evaluated if you need seen immediately   ? Sudden, severe shortness of breath go to the Emergency Room    If you have questions regarding these instructions, please call our office  [phone]. We have an answering service 24 hours a day to get your calls to the ON Call Physician, but we are often in surgery and it takes us awhile to get back to you.    ? BRING YOUR MEDICATION LIST and medications even over the counter medications to all your follow up apointments. ? Office Location:   [facility], [address] [place] [address], [facility], [facility] [address]     Follow-up:    1. Follow up with Vane Estrella PA-C  in 3 weeks. 2. The pt was agreeable to discharge and future plan of care. All questions were thoroughly answered.        aVne Estrella PA-C   Electronincally signed 4/28/2022 at 1:21 PM    CC: Lamar Adam MD

## 2022-04-28 NOTE — PROGRESS NOTES
1325- pt to pacu, pt resting in bed, resp easy and unlabored, pt appears in no acute distress    1337- pt resting in bed, resp easy and unlabored, fistula listened to, neuro check completed    1359- pt given 4 units of regular insulin for glucose of 206    1400- pt meets criteria for discharge from pacu,  Report given to [employer]

## 2022-04-28 NOTE — FLOWSHEET NOTE
Pt admitted to [facility] room 6 and oriented to unit. Fall band applied. SCD sleeves applied. Nares swabbed. Pt verbalized permission for first name, last initial and physicians name on white board. SDS board and discharge criteria explained, pt and family verbalized understanding. Pt denies thoughts of harming self or others. Call light in reach. Family (cousin Jossie Tolentino) waiting in car in parking lot, number on chart.

## 2022-04-28 NOTE — H&P
CT/CV Surgery H & P        Patient's Name/Date of Birth: Mirella Mcarthur / 1995 (27 y.o.)        PCP: Mary Ann Steel MD     Date: April 28, 2022        HPI:            Mirella Mcarthur is a very pleasant 27y.o. year old male with a history of hypertension, DM 1, neuropathy, chronic kidney disease, stage V, coronary artery disease, status post remote MI, status post bacterial endocarditis in the past, treated with antibiotics for 6 weeks, status post CVA with left foot drop, and one episode of a seizure disorder due to extreme hyperglycemia.      He denied any recent episode of midsternal chest pain or indigestion-like symptoms. He is a right-handed person. He started to receive hemodialysis 2 months ago. He has a right internal jugular vein tunneled catheter.     PastMedical History:  Elizabeth Lawrence  has a past medical history of Diabetes ([facility] [address] Foot drop, left foot, Kidney failure, Neuropathy, and Thyroid disease.     Past Surgical History:  The patient  has a past surgical history that includes TUNNELED CENTRAL VENOUS CATHETER W/O SUBCUTANEOUS PORT (Right, 12/23/2021) and IR TUNNELED CVC PLACE WO SQ PORT/PUMP > 5 YEARS (12/23/2021).    Allergies: The patient has No Known Allergies.     Medications:    Current Medication[]Expand by Default      Current Outpatient Medications:     levothyroxine (SYNTHROID) 25 MCG tablet, Take 25 mcg by mouth Daily, Disp: , Rfl:     Blood Glucose Monitoring Suppl (TRUE METRIX METER) w/Device KIT, 1 kit by Does not apply route 4 times daily (before meals and nightly), Disp: 1 kit, Rfl: 0    blood glucose test strips (TRUE METRIX BLOOD GLUCOSE TEST) strip, 1 each by In Vitro route 4 times daily (with meals and nightly) As needed. , Disp: 200 each, Rfl: 3    Lancets MISC, 1 each by Does not apply route 4 times daily, Disp: 200 each, Rfl: 0    sodium bicarbonate 650 MG tablet, Take 2 tablets by mouth 3 times daily, Disp: 90 tablet, Rfl: 1    amLODIPine (NORVASC) 10 MG tablet, Take 1 tablet by mouth daily, Disp: 30 tablet, Rfl: 3    metOLazone (ZAROXOLYN) 5 MG tablet, Take 1 tablet by mouth 2 times daily, Disp: 60 tablet, Rfl: 1    torsemide (DEMADEX) 100 MG tablet, Take 1 tablet by mouth daily, Disp: 30 tablet, Rfl: 3    insulin glargine (LANTUS) 100 UNIT/ML injection vial, Inject 20 Units into the skin 2 times daily Every morning and every evening, Disp: , Rfl:     pregabalin (LYRICA) 100 MG capsule, Take 200 mg by mouth 2 times daily. , Disp: , Rfl:     QUEtiapine (SEROQUEL) 25 MG tablet, Take 25 mg by mouth nightly, Disp: , Rfl:     FLUoxetine (PROZAC) 20 MG capsule, Take 20 mg by mouth nightly, Disp: , Rfl:     FERROUS SULFATE PO, Take 324 mg by mouth every other day, Disp: , Rfl:         Family History: This patient's family history is not on file.     Social History:  Brandan Alvarenga  reports that he has been smoking cigarettes. He has been smoking about 0.50 packs per day. He has never used smokeless tobacco. He reports current drug use. Drug: Marijuana Jasmina Ing). He reports that he does not drink alcohol.     Vital Signs:  Vitals:    04/28/22 0546   BP: (!) 165/94   Pulse: 77   Resp: 16   Temp: 97.8 °F (36.6 °C)   SpO2: 98%        Ht 5' 3\" (1.6 m)   Wt 120 lb (54.4 kg)   BMI 21.26 kg/m²      ROS:   Constitutional: Negative for activity change, chills, fatigue, fever and unexpected weight change. Respiratory: Negative for sleep apnea, shortness of breath, wheezing, stridor, supplementary home oxygen. Cardiac: Remote myocardial infarction. Vascular: Negative for claudication, leg  calf muscle pain, hip pain. Gastrointestinal: Negative for hematochezia, melana, constipation, and N/V/D. Musculoskeletal: Left foot drop. Skin: Negative for color change, rash and wound. Neurological: Remote CVA. Residual symptom of left foot drop.   Nephrology: Chronic kidney disease, stage V.     Physical Exam:  General appearance:  No acute distress, appears stated age and cooperative. Neck: No jugular venous distention. Trachea midline. No carotid bruits. Chest Wall: No deformity, midsternal scar  Respiratory:  Normal respiratory effort. Clear to auscultation, bilaterally without Rales/Wheezes/Rhonch. Cardiovascular:  Regular rate and rhythm with normal S1/S2 without murmurs, rubs or gallops. Abdomen: Soft, non-tender, non-distended with normal bowel sounds. Ext: Brace on left LE. Skin: Skin color, texture, turgor normal.  No rashes or lesions. No rubor. No venous stasis pigmentation. Neurologic:  Neurovascularly intact without any focal sensory/motor deficits.     Peripheral Pulses: 2 palpable radial pulses bilaterally.     Labs:    CBC:        Lab Results   Component Value Date     WBC 9.6 02/23/2022     HGB 8.3 02/23/2022     HCT 25.7 02/23/2022     MCV 84.5 02/23/2022      02/23/2022     PROTIME 9.9 12/23/2021     INR 0.88 12/23/2021      BMP:         Lab Results   Component Value Date      01/03/2022     K 4.4 01/03/2022     K 4.3 12/20/2021     CL 94 01/03/2022     CO2 25 01/03/2022     PHOS 5.9 12/16/2021      01/03/2022     CREATININE 5.8 01/03/2022     MG 2.30 12/23/2021         Imaging   [facility] has reviewed all imaging including venous mapping.        Problem List:      Patient Active Problem List   Diagnosis    DKA, type 1, not at goal [facility])    Acute infective endocarditis    Acute kidney injury superimposed on chronic kidney disease ([address]    Metabolic acidosis    Peripheral edema    CKD (chronic kidney disease)    DKA (diabetic ketoacidosis) ([address]    Type 1 diabetes mellitus ([address]    MSSA bacteremia    Poorly controlled type 2 diabetes mellitus ([address]    History of intravenous drug use in remission    Weight loss counseling, encounter for    Receiving intravenous antibiotic treatment as outpatient         Assessment: Chronic kidney disease, stage V, status post right internal jugular vein tunneled catheter.     Plan 4/28/22:  1)   Shiva Cheatham plans for left upper arm AV fistula surgery today.     Electronically signed by aLni Oviedo PA-C on 4/28/2022 at 7:17 AM

## 2022-04-28 NOTE — OP NOTE
Operative Note      Patient: Carlos Reyes  YOB: 1995  MRN: [id number]    Date of Procedure: 4/28/2022    Pre-Op Diagnosis: Chronic kidney disease, stage V, currently on chronic hemodialysis via right internal jugular vein tunneled catheter. Post-Op Diagnosis: Same as preop. Name of procedure:  1. Attempted left upper arm brachial artery to cephalic vein transposition AV fistula. 2.  Left upper arm brachial artery to basilic vein transposition AV fistula. Surgeon: Dr. Bradley Michelle. Assistant:   Physician Assistant: Wild Tovar PA-C   Because of the complexity of the procedure, a physician assistant was required to assist the surgeon throughout the procedure. Mr. Trevon Macario closed the incisions. Anesthesia: General    Estimated Blood Loss (mL): Less than 20 mL. Complications: Thrombosed brachial artery to cephalic vein transposition AV fistula. Specimens: None. Implants: None. Drains: None. Findings: The brachial artery was felt soft and 4 mm in diameter. The cephalic vein was 3 mm in diameter. The basilic vein was 6 mm proximally and 4 mm distally. The left upper arm brachial artery to cephalic vein transposition AV fistula was performed initially. However after completion of the procedure and reversal of heparin with protamine, the AV fistula was found to be clotted. The left upper arm brachial artery to basilic vein transposition AV fistula was performed without further complication. Detailed Description of Procedure: The patient was brought to the operating room, was placed in a supine position. After a routine preparation, and general endotracheal anesthesia, I did an intraoperative ultrasound to locate the brachial artery, cephalic and basilic vein. Skin marks were made. The left arm was painted and draped in a sterile fashion. A timeout was called and all necessary measures were taken.     A longitudinal incision was made over the cephalic vein in the left upper arm. The vein was isolated after dissection. Branches were divided between double ligatures. A small incision was made over the left brachial artery close to the antecubital fossa. The brachial artery was isolated and vascular control was obtained. A subcutaneous tunnel was made connecting the brachial artery and the upper corner of the cephalic vein exposing incision. The patient was given heparin intravenously. The cephalic vein was severed distally. The proximal stump of the distal cephalic vein was suture-ligated. The cephalic vein was injected with heparinized saline which confirmed an excellent flow. The cephalic vein was brought to the brachial artery through the subcutaneous tunnel. The brachial artery was clamped proximally and distally. A 4 mm long arteriotomy opening was made in the brachial artery. The cephalic vein was cut in length, beveled, and was anastomosed to the side of brachial artery in an end-to-side fashion with 7-0 Prolene. Excellent Doppler signal was heard throughout the AV fistula. Complete hemostasis confirmed. Protamine was given. The incisions were closed in layers using absorbable suture. However, no Doppler signal was heard over the AV fistula during the skin closure. The incision was reopened. The AV fistula was found to be clotted. A decision was made to proceed with a brachial artery to basilic vein transposition AV fistula. The basilic vein was exposed through a longitudinal incision. Approximately 15 cm long basilic vein was isolated. A reversed \"C\" shape subcutaneous tunnel was made connecting brachial artery and the upper corner of basilic vein exposing incision. The patient was given heparin intravenously again. The basilic vein was severed distally. The vein was injected with intravenous heparin. There was an excellent flow through the basilic vein.     The basilic vein was brought to the brachial artery through the subcutaneous tunnel. The brachial artery was clamped proximally and distally again. The cephalic vein was detached from the brachial arterial anastomotic site. And the brachial artery was flushed out to remove possible debris. The basilic vein was cut in length, beveled, and was anastomosed to the side of the brachial artery in an end-to-side fashion with a 7-0 Prolene. Complete hemostasis was confirmed. There was an excellent Doppler signal throughout the AV fistula. No protamine was given at this time. Complete hemostasis was confirmed. The incisions were closed with absorbable sutures. The skin was also closed with absorbable sutures in a subcuticular fashion. Patient tolerated the procedure well. He was extubated in the operating room and was transferred to the recovery room in stable condition.         Electronically signed by Volodymyr Shea MD on 4/28/2022 at 4:01 PM

## 2022-04-28 NOTE — ANESTHESIA PRE PROCEDURE
Department of Anesthesiology  Preprocedure Note       Name:  Carole Foss   Age:  27 y.o.  :  1995                                          MRN:  [id number]         Date:  2022      Surgeon: Evelyn Jones):  Marco Horner MD    Procedure: Procedure(s):  LEFT UPPER ARM AV FISTULA    Medications prior to admission:   Prior to Admission medications    Medication Sig Start Date End Date Taking? Authorizing Provider   ondansetron (ZOFRAN) 4 MG tablet Take by mouth 3 times daily as needed for Nausea or Vomiting 2 tab    Historical Provider, MD   sevelamer (RENVELA) 800 MG tablet Take 2 tablets by mouth 3 times daily (with meals) And 1 tab with snacks    Historical Provider, MD   carvedilol (COREG) 6.25 MG tablet Take 6.25 mg by mouth 2 times daily (with meals)    Historical Provider, MD   citalopram (CELEXA) 40 MG tablet Take 40 mg by mouth daily    Historical Provider, MD   levothyroxine (SYNTHROID) 25 MCG tablet Take 25 mcg by mouth Daily    Historical Provider, MD   sodium bicarbonate 650 MG tablet Take 2 tablets by mouth 3 times daily 21   Orestes Napoles MD   amLODIPine (NORVASC) 10 MG tablet Take 1 tablet by mouth daily 21   Orestes Napoles MD   metOLazone (ZAROXOLYN) 5 MG tablet Take 1 tablet by mouth 2 times daily 21   Orestes Napoles MD   torsemide (DEMADEX) 100 MG tablet Take 1 tablet by mouth daily 21   Marilin Candelario MD   insulin glargine (LANTUS) 100 UNIT/ML injection vial Inject 20 Units into the skin 2 times daily Every morning and every evening    Historical Provider, MD   pregabalin (LYRICA) 100 MG capsule Take 200 mg by mouth 2 times daily.     Historical Provider, MD   QUEtiapine (SEROQUEL) 25 MG tablet Take 25 mg by mouth nightly    Historical Provider, MD   FLUoxetine (PROZAC) 20 MG capsule Take 20 mg by mouth nightly    Historical Provider, MD   FERROUS SULFATE PO Take 324 mg by mouth every other day    Historical Provider, MD       Current medications:    Current Facility-Administered Medications   Medication Dose Route Frequency Provider Last Rate Last Admin    0.9 % sodium chloride infusion  25 mL IntraVENous PRN Jerod Garber PA-C 100 mL/hr at 04/28/22 0639 NoRateChange at 04/28/22 9924    ceFAZolin (ANCEF) 2000 mg in sterile water 20 mL IV syringe  2,000 mg IntraVENous [facility]        sodium chloride flush 0.9 % injection 10 mL  10 mL IntraVENous 2 times per day Jerod Garber PA-C        sodium chloride flush 0.9 % injection 10 mL  10 mL IntraVENous PRN Jerod Garber PA-C        sodium chloride flush 0.9 % injection 5-40 mL  5-40 mL IntraVENous 2 times per day Tom Zarate MD        sodium chloride flush 0.9 % injection 5-40 mL  5-40 mL IntraVENous PRN Tom Zarate MD        0.9 % sodium chloride infusion   IntraVENous PRN Tom Zarate MD        fentaNYL (SUBLIMAZE) injection 25 mcg  25 mcg IntraVENous Q5 Min PRN Tom Zarate MD        fentaNYL (SUBLIMAZE) injection 50 mcg  50 mcg IntraVENous Q5 Min PRN Tom Zarate MD        metoclopramide [facility]) injection 10 mg  10 mg IntraVENous Once PRN Tom Zarate MD        diphenhydrAMINE (BENADRYL) injection 12.5 mg  12.5 mg IntraVENous Once PRN Tom Zarate MD           Allergies:  No Known Allergies    Problem List:    Patient Active Problem List   Diagnosis Code    DKA, type 1, not at goal [facility]) E10.10    Acute infective endocarditis I33.0    Acute kidney injury superimposed on chronic kidney disease ([facility] [address] N17.9, O58.7    Metabolic acidosis M68.8    Peripheral edema R60.9    CKD (chronic kidney disease) N18.9    DKA (diabetic ketoacidosis) (HCC) E11.10    Type 1 diabetes mellitus ([facility] [address] E10.9    MSSA bacteremia R78.81, B95.61    Poorly controlled type 2 diabetes mellitus ([facility] [address] E11.65    History of intravenous drug use in remission Z87.898    Weight loss counseling, encounter for Z71.3    Receiving intravenous antibiotic treatment as outpatient Z79.2    Sensory disturbance R20.9    Major depressive disorder, single episode, severe with psychotic features ([facility] [address] F32.3    Peripheral neuropathy G62.9    Persistent depressive disorder F34.1    ADHD F90.9    Proteinuria R80.9    Oppositional disorder F91.3    Adjustment disorder with mixed emotional features F43.29    Plantar fasciitis M72.2    Type 1 diabetes mellitus with nephropathy (HCC) E10.21    Hyperlipidemia E78.5    Homeless Z59.00    Anxiety disorder F41.9       Past Medical History:        Diagnosis Date    CAD (coronary artery disease)     Cerebral artery occlusion with cerebral infarction ([facility] [address]     Diabetes ([facility] [address]     Foot drop, left foot     Hemodialysis patient ([facility] [address]     Mon, Wed, Fri Alvina Coupe    History of acquired heart valve infection 04/15/2022    History of methamphetamine abuse ([facility] [address]     Hypertension     Kidney failure     STAGE 4    Neuropathy     Thyroid disease        Past Surgical History:        Procedure Laterality Date    IR TUNNELED CATHETER PLACEMENT GREATER THAN 5 YEARS  12/23/2021    IR TUNNELED CATHETER PLACEMENT GREATER THAN 5 YEARS 12/23/2021 [facility]    TUNNELED CENTRAL VENOUS CATHETER W/O SUBCUTANEOUS PORT Right 12/23/2021    Powerline, cut at 24, inserted by Dr. Pérez Strange History:    Social History     Tobacco Use    Smoking status: Current Some Day Smoker     Packs/day: 0.50     Years: 12.00     Pack years: 6.00     Types: Cigarettes    Smokeless tobacco: Never Used   Substance Use Topics    Alcohol use: Never                                Ready to quit: Not Answered  Counseling given: Not Answered      Vital Signs (Current):   Vitals:    04/28/22 0546   BP: (!) 165/94   Pulse: 77   Resp: 16   Temp: 97.8 °F (36.6 °C)   TempSrc: Temporal   SpO2: 98%   Weight: 120 lb (54.4 kg)   Height: 5' 4\" (1.626 m)                                              BP Readings from Last 3 Encounters:   04/28/22 (!) 165/94   04/14/22 128/72 03/29/22 132/80       NPO Status: Time of last liquid consumption: 2200                        Time of last solid consumption: 2200                        Date of last liquid consumption: 04/27/22                        Date of last solid food consumption: 04/27/22    BMI:   Wt Readings from Last 3 Encounters:   04/28/22 120 lb (54.4 kg)   04/14/22 144 lb 6.4 oz (65.5 kg)   03/29/22 120 lb (54.4 kg)     Body mass index is 20.6 kg/m². CBC:   Lab Results   Component Value Date    WBC 5.2 04/14/2022    RBC 3.70 04/14/2022    HGB 10.7 04/14/2022    HCT 33.3 04/14/2022    MCV 90.0 04/14/2022    RDW 15.8 01/03/2022     04/14/2022       CMP:   Lab Results   Component Value Date     04/28/2022    K 3.9 04/28/2022    CL 97 04/28/2022    CO2 31 04/28/2022    BUN 15 04/28/2022    CREATININE 3.6 04/28/2022    GFRAA 15 12/23/2021    AGRATIO 0.8 12/23/2021    LABGLOM 20 04/28/2022    GLUCOSE 254 04/28/2022    PROT 5.5 12/23/2021    CALCIUM 9.3 04/28/2022    BILITOT <0.2 12/23/2021    ALKPHOS 77 12/23/2021    AST 36 12/23/2021    ALT 7 12/23/2021       POC Tests: No results for input(s): POCGLU, POCNA, POCK, POCCL, POCBUN, POCHEMO, POCHCT in the last 72 hours.     Coags:   Lab Results   Component Value Date    PROTIME 9.9 12/23/2021    INR 1.01 04/14/2022       HCG (If Applicable): No results found for: PREGTESTUR, PREGSERUM, HCG, HCGQUANT     ABGs: No results found for: PHART, PO2ART, OCS7ZLI, UDJ3XQG, BEART, R4FFFRFQ     Type & Screen (If Applicable):  No results found for: LABABO, LABRH    Drug/Infectious Status (If Applicable):  No results found for: HIV, HEPCAB    COVID-19 Screening (If Applicable):   Lab Results   Component Value Date    COVID19 Not Detected 12/19/2021           Anesthesia Evaluation  Patient summary reviewed no history of anesthetic complications:   Airway: Mallampati: II  TM distance: >3 FB   Neck ROM: full  Mouth opening: > = 3 FB Dental: normal exam         Pulmonary:normal exam Cardiovascular:    (+) hypertension:, CAD:,                   Neuro/Psych:   (+) CVA:, psychiatric history:            GI/Hepatic/Renal:             Endo/Other:    (+) Diabetes, . Abdominal:             Vascular: Other Findings:             Anesthesia Plan      general     ASA 3       Induction: intravenous. MIPS: Postoperative opioids intended and Prophylactic antiemetics administered. Anesthetic plan and risks discussed with patient.       Plan discussed with surgical team.                  Mague Richmond MD   4/28/2022

## 2022-04-28 NOTE — ANESTHESIA POSTPROCEDURE EVALUATION
Department of Anesthesiology  Postprocedure Note    Patient: Janae Felix  MRN: [id number]  YOB: 1995  Date of evaluation: 4/28/2022  Time:  2:36 PM     Procedure Summary     Date: 04/28/22 Room / Location: [address]    Anesthesia Start: 7079 Anesthesia Stop: 6351    Procedure: LEFT UPPER ARM AV FISTULA, BRACHIAL ARTERY TO CEPHALIC VEIN TRANSPOSITION AV FISTULA  ATTEMPTED, BRACHIAL TO BASILIC VEIN TRANSPOSITION AV FISTULA (Left ) Diagnosis: (ESRD)    Surgeons: Austin Leiva MD Responsible Provider: Michael Shah MD    Anesthesia Type: general ASA Status: 3          Anesthesia Type: general    Paco Phase I: Paco Score: 8    Paco Phase II: Paco Score: 9    Last vitals: Reviewed and per EMR flowsheets.        Anesthesia Post Evaluation    Patient location during evaluation: PACU  Patient participation: complete - patient participated  Level of consciousness: awake  Airway patency: patent  Nausea & Vomiting: no nausea  Complications: no  Cardiovascular status: hemodynamically stable  Respiratory status: acceptable  Hydration status: stable

## 2022-04-28 NOTE — PROGRESS NOTES
Pt has met discharge criteria and states he is ready for discharge to home. IV removed, gauze and tape applied. Dressed in own clothes and personal belongings gathered. Discharge instructions (with opioid medication education information) given to pt and family; pt and family verbalized understanding of discharge instructions, prescriptions and follow up appointments. Pt transported to discharge lobby by [place] staff.

## 2022-05-10 ENCOUNTER — OFFICE VISIT (OUTPATIENT)
Dept: CARDIOTHORACIC SURGERY | Age: 27
End: 2022-05-10

## 2022-05-10 VITALS
HEART RATE: 81 BPM | SYSTOLIC BLOOD PRESSURE: 142 MMHG | BODY MASS INDEX: 21.24 KG/M2 | OXYGEN SATURATION: 98 % | WEIGHT: 124.4 LBS | DIASTOLIC BLOOD PRESSURE: 84 MMHG | HEIGHT: 64 IN

## 2022-05-10 DIAGNOSIS — Z98.890 S/P ARTERIOVENOUS (AV) FISTULA CREATION: Primary | ICD-10-CM

## 2022-05-10 PROCEDURE — 99024 POSTOP FOLLOW-UP VISIT: CPT | Performed by: PHYSICIAN ASSISTANT

## 2022-05-10 NOTE — PROGRESS NOTES
CT/CV Surgery Follow Up Office Visit      Patient's Name/Date of Birth: Sue West / 1995 (27 y.o.)    CC:   Chief Complaint   Patient presents with    Follow-up     s/p Attempted left upper arm brachial artery to cephalic vein transposition AV fistula. Left upper arm brachial artery to basilic vein transposition AV fistula. [phone] with Dr. Cindi Benavides        PCP: Jeanne Baron MD    Date: May 10, 2022     HPI:   We had the pleasure of seeing Sue West in the office today, as you know this is a very pleasant 27y.o. year old male S/p Attempted left upper arm brachial artery to cephalic vein transposition AV fistula. , then proceeded with left upper arm brachial artery to basilic vein transposition AV fistula on 4/28/22. He is doing well and notes swelling in Left UE is slowly improving. Past Medical History:  Francisca Garcia  has a past medical history of CAD (coronary artery disease), Cerebral artery occlusion with cerebral infarction ([address] Diabetes ([address] Foot drop, left foot, Hemodialysis patient ([address] History of acquired heart valve infection, History of methamphetamine abuse ([address] Hypertension, Kidney failure, Neuropathy, and Thyroid disease. Past Surgical History:  The patient  has a past surgical history that includes TUNNELED CENTRAL VENOUS CATHETER W/O SUBCUTANEOUS PORT (Right, 12/23/2021); IR TUNNELED CVC PLACE WO SQ PORT/PUMP > 5 YEARS (12/23/2021); and Dialysis fistula creation (Left, 4/28/2022). Allergies: The patient has No Known Allergies. Medications:  Prior to Admission medications    Medication Sig Start Date End Date Taking?  Authorizing Provider   ondansetron (ZOFRAN) 4 MG tablet Take by mouth 3 times daily as needed for Nausea or Vomiting 2 tab   Yes Historical Provider, MD   sevelamer (RENVELA) 800 MG tablet Take 2 tablets by mouth 3 times daily (with meals) And 1 tab with snacks   Yes Historical Provider, MD   carvedilol (COREG) 6.25 MG tablet Take 6.25 mg by mouth 2 times daily (with meals)   Yes Historical Provider, MD   citalopram (CELEXA) 40 MG tablet Take 40 mg by mouth daily   Yes Historical Provider, MD   levothyroxine (SYNTHROID) 25 MCG tablet Take 25 mcg by mouth Daily   Yes Historical Provider, MD   sodium bicarbonate 650 MG tablet Take 2 tablets by mouth 3 times daily 12/23/21  Yes Amelia Fish MD   amLODIPine (NORVASC) 10 MG tablet Take 1 tablet by mouth daily 12/24/21  Yes Amelia Fish MD   metOLazone (ZAROXOLYN) 5 MG tablet Take 1 tablet by mouth 2 times daily 12/23/21  Yes Amelia Fish MD   torsemide (DEMADEX) 100 MG tablet Take 1 tablet by mouth daily 12/23/21  Yes Cornelia Blood MD   insulin glargine (LANTUS) 100 UNIT/ML injection vial Inject 20 Units into the skin 2 times daily Every morning and every evening   Yes Historical Provider, MD   pregabalin (LYRICA) 100 MG capsule Take 200 mg by mouth 2 times daily. Yes Historical Provider, MD   QUEtiapine (SEROQUEL) 25 MG tablet Take 25 mg by mouth nightly   Yes Historical Provider, MD   FLUoxetine (PROZAC) 20 MG capsule Take 20 mg by mouth nightly   Yes Historical Provider, MD   FERROUS SULFATE PO Take 324 mg by mouth every other day   Yes Historical Provider, MD       Family History: This patient's family history includes Diabetes in his brother and paternal uncle; Heart Disease in his paternal grandfather. Social History:  Josemanuel Monroy  reports that he has been smoking cigarettes. He has a 6.00 pack-year smoking history. He has never used smokeless tobacco. He reports previous drug use. Drugs: Marijuana (Weed) and Methamphetamines (Crystal Meth). He reports that he does not drink alcohol. Vital Signs:   BP (!) 142/84 (Site: Left Upper Arm)   Pulse 81   Ht 5' 4\" (1.626 m)   Wt 124 lb 6.4 oz (56.4 kg)   SpO2 98%   BMI 21.35 kg/m²     Physical Exam:  General appearance:  No acute distress, appears stated age and cooperative. Neck: No jugular venous distention.  Trachea midline. .   Cardiovascular:  Regular rate and rhythm with normal S1/S2 without murmurs, rubs or gallops. Bilateral UEs: R UE: radial pulse 2+                           L UE: radial pulse 2+, palpable thrill over fistula and continuous doppler signal on hand held doppler. Labs:    CBC:  Lab Results   Component Value Date    WBC 5.2 04/14/2022    HGB 10.7 04/14/2022    HCT 33.3 04/14/2022    MCV 90.0 04/14/2022     04/14/2022    PROTIME 9.9 12/23/2021    INR 1.01 04/14/2022     BMP:   Lab Results   Component Value Date     04/28/2022    K 3.9 04/28/2022    CL 97 04/28/2022    CO2 31 04/28/2022    PHOS 5.9 12/16/2021    BUN 15 04/28/2022    CREATININE 3.6 04/28/2022    MG 2.30 12/23/2021       Active Problem List  Patient Active Problem List   Diagnosis    DKA, type 1, not at goal [facility])    Acute infective endocarditis    Acute kidney injury superimposed on chronic kidney disease ([address]    Metabolic acidosis    Peripheral edema    CKD (chronic kidney disease)    DKA (diabetic ketoacidosis) ([address]    Type 1 diabetes mellitus ([address]    MSSA bacteremia    Poorly controlled type 2 diabetes mellitus ([address]    History of intravenous drug use in remission    Weight loss counseling, encounter for    Receiving intravenous antibiotic treatment as outpatient    Sensory disturbance    Major depressive disorder, single episode, severe with psychotic features ([address]    Peripheral neuropathy    Persistent depressive disorder    ADHD    Proteinuria    Oppositional disorder    Adjustment disorder with mixed emotional features    Plantar fasciitis    Type 1 diabetes mellitus with nephropathy ([address]    Hyperlipidemia    Homeless    Anxiety disorder    S/P arteriovenous (AV) fistula repair       Assessment:  S/p AV fistula creation      Plan 5/10/22:  1. Reminded pt of importance of elevating L arm above heart. 2.  May use AV fistula for HD in 2 months.     Thank you for allowing us to be involved in the patient's care.     Electronically by Cristy Diehl PA-C  on 5/10/2022 at 9:31 AM

## 2022-05-12 RX ORDER — CARVEDILOL 6.25 MG/1
TABLET ORAL
Qty: 60 TABLET | OUTPATIENT
Start: 2022-05-12

## (undated) DEVICE — PACK PROCEDURE SURG SET UP SRMC

## (undated) DEVICE — LOOP VES W13MM THK09MM MINI RED SIL FLD REPELLENT

## (undated) DEVICE — SUTURE MCRYL + SZ 3-0 L27IN ABSRB UD PS1 L24MM 3/8 CIR REV MCP936H

## (undated) DEVICE — CATHETER ETER IV 18GA L125IN POLYUR STR RADPQ INTROCAN SFTY

## (undated) DEVICE — DRAPE,EXTREMITY,89X128,STERILE: Brand: MEDLINE

## (undated) DEVICE — 35 ML SYRINGE LUER-LOCK TIP: Brand: MONOJECT

## (undated) DEVICE — 3M™ STERI-DRAPE™ INSTRUMENT POUCH 1018: Brand: STERI-DRAPE™

## (undated) DEVICE — CANNULA INJ L2.5IN BLNT TIP 3MM CLR BODY W/ 1 W VLV DLP

## (undated) DEVICE — FOGARTY SPRING CLIPS 6MM: Brand: FOGARTY SOFTJAW

## (undated) DEVICE — PROVE COVER: Brand: UNBRANDED

## (undated) DEVICE — BASIC SINGLE BASIN BTC-LF: Brand: MEDLINE INDUSTRIES, INC.

## (undated) DEVICE — PACK-MAJOR

## (undated) DEVICE — INTENDED FOR TISSUE SEPARATION, AND OTHER PROCEDURES THAT REQUIRE A SHARP SURGICAL BLADE TO PUNCTURE OR CUT.: Brand: BARD-PARKER ® CARBON RIB-BACK BLADES

## (undated) DEVICE — Z DISCONTINUED NO SUB IDED TAPE UMB W1/8XL36IN WHT COT STRND NONRADIOPAQUE

## (undated) DEVICE — SYRINGE IRRIG 60ML SFT PLIABLE BLB EZ TO GRP 1 HND USE W/

## (undated) DEVICE — PENCIL SMK EVAC ALL IN 1 DSGN ENH VISIBILITY IMPROVED AIR

## (undated) DEVICE — BOOT,SUTURE,STANDARD,YELLOW-IN-BLUE: Brand: MEDLINE

## (undated) DEVICE — SUTURE VCRL SZ 2-0 L27IN ABSRB UD L26MM SH 1/2 CIR J417H

## (undated) DEVICE — SUTURE PROL SZ 6-0 L24IN NONABSORBABLE BLU L13MM C-1 3/8 8726H

## (undated) DEVICE — SUTURE PERMAHAND SZ 3-0 L18IN NONABSORBABLE BLK SILK BRAID A184H

## (undated) DEVICE — APPLICATOR MEDICATED 26 CC SOLUTION HI LT ORNG CHLORAPREP

## (undated) DEVICE — SUTURE PROL SZ 7-0 L24IN NONABSORBABLE BLU L9.3MM CC 3/8 8704H

## (undated) DEVICE — SCANLAN® VASCU-STATT® II PLUS S-USE BULLDOG CLAMP W/FIRM PRESS GENTLE-JAW™- MAXI ANGLED 45° (ORANGE), CLAMPING PRESS 165-175 G (2/STERILE PKG): Brand: SCANLAN® VASCU-STATT® II PLUS S-USE BULLDOG CLAMP W/FIRM PRESS GENTLE-JAW™

## (undated) DEVICE — SUTURE PERMAHAND SZ 3-0 L30IN NONABSORBABLE BLK SH L26MM K832H

## (undated) DEVICE — GLOVE ORANGE PI 8   MSG9080

## (undated) DEVICE — SUTURE PROL SZ 3-0 L30IN NONABSORBABLE BLU L26MM CT-2 1/2 8422H

## (undated) DEVICE — GOWN,SIRUS,NONRNF,SETINSLV,XL,20/CS: Brand: MEDLINE

## (undated) DEVICE — GAUZE,SPONGE,8"X4",12PLY,XRAY,STRL,LF: Brand: MEDLINE

## (undated) DEVICE — ADHESIVE SKIN CLSR 0.7ML TOP DERMBND ADV

## (undated) DEVICE — GLOVE ORANGE PI 8 1/2   MSG9085

## (undated) DEVICE — CATHETER IV 18GA L1.16IN OD1.27-1.3462MM ID0.9398-1.016MM